# Patient Record
Sex: FEMALE | Race: WHITE | NOT HISPANIC OR LATINO | Employment: OTHER | URBAN - METROPOLITAN AREA
[De-identification: names, ages, dates, MRNs, and addresses within clinical notes are randomized per-mention and may not be internally consistent; named-entity substitution may affect disease eponyms.]

---

## 2023-04-06 DIAGNOSIS — I10 HYPERTENSION, UNSPECIFIED TYPE: ICD-10-CM

## 2023-04-06 RX ORDER — VALSARTAN AND HYDROCHLOROTHIAZIDE 160; 12.5 MG/1; MG/1
1 TABLET, FILM COATED ORAL DAILY
Qty: 90 TABLET | Refills: 3 | Status: SHIPPED | OUTPATIENT
Start: 2023-04-06 | End: 2024-01-04 | Stop reason: ALTCHOICE

## 2023-04-06 RX ORDER — VALSARTAN AND HYDROCHLOROTHIAZIDE 160; 12.5 MG/1; MG/1
1 TABLET, FILM COATED ORAL DAILY
COMMUNITY
End: 2023-04-06 | Stop reason: SDUPTHER

## 2023-04-12 RX ORDER — ATORVASTATIN CALCIUM 10 MG/1
10 TABLET, FILM COATED ORAL DAILY
Qty: 90 TABLET | Refills: 1 | Status: SHIPPED | OUTPATIENT
Start: 2023-04-12 | End: 2024-02-02 | Stop reason: HOSPADM

## 2023-04-12 RX ORDER — ATORVASTATIN CALCIUM 10 MG/1
10 TABLET, FILM COATED ORAL DAILY
COMMUNITY
End: 2023-04-12 | Stop reason: SDUPTHER

## 2023-06-07 LAB
ALANINE AMINOTRANSFERASE (SGPT) (U/L) IN SER/PLAS: 14 U/L (ref 7–45)
ALBUMIN (G/DL) IN SER/PLAS: 4.1 G/DL (ref 3.4–5)
ALKALINE PHOSPHATASE (U/L) IN SER/PLAS: 77 U/L (ref 33–136)
ANION GAP IN SER/PLAS: 13 MMOL/L (ref 10–20)
ASPARTATE AMINOTRANSFERASE (SGOT) (U/L) IN SER/PLAS: 21 U/L (ref 9–39)
BILIRUBIN TOTAL (MG/DL) IN SER/PLAS: 0.3 MG/DL (ref 0–1.2)
CALCIDIOL (25 OH VITAMIN D3) (NG/ML) IN SER/PLAS: 53 NG/ML
CALCIUM (MG/DL) IN SER/PLAS: 10.8 MG/DL (ref 8.6–10.6)
CARBON DIOXIDE, TOTAL (MMOL/L) IN SER/PLAS: 31 MMOL/L (ref 21–32)
CHLORIDE (MMOL/L) IN SER/PLAS: 106 MMOL/L (ref 98–107)
CREATININE (MG/DL) IN SER/PLAS: 1.44 MG/DL (ref 0.5–1.05)
GFR FEMALE: 35 ML/MIN/1.73M2
GLUCOSE (MG/DL) IN SER/PLAS: 98 MG/DL (ref 74–99)
PARATHYRIN INTACT (PG/ML) IN SER/PLAS: 71.5 PG/ML (ref 18.5–88)
POTASSIUM (MMOL/L) IN SER/PLAS: 4.6 MMOL/L (ref 3.5–5.3)
PROTEIN TOTAL: 6 G/DL (ref 6.4–8.2)
SODIUM (MMOL/L) IN SER/PLAS: 145 MMOL/L (ref 136–145)
THYROTROPIN (MIU/L) IN SER/PLAS BY DETECTION LIMIT <= 0.05 MIU/L: 2.63 MIU/L (ref 0.44–3.98)
UREA NITROGEN (MG/DL) IN SER/PLAS: 34 MG/DL (ref 6–23)

## 2023-09-27 DIAGNOSIS — Z00.00 ANNUAL PHYSICAL EXAM: ICD-10-CM

## 2023-10-26 ENCOUNTER — LAB (OUTPATIENT)
Dept: LAB | Facility: LAB | Age: 88
End: 2023-10-26
Payer: MEDICARE

## 2023-10-26 DIAGNOSIS — Z00.00 ENCOUNTER FOR GENERAL ADULT MEDICAL EXAMINATION WITHOUT ABNORMAL FINDINGS: Primary | ICD-10-CM

## 2023-10-26 DIAGNOSIS — Z00.00 ANNUAL PHYSICAL EXAM: ICD-10-CM

## 2023-10-26 LAB
25(OH)D3 SERPL-MCNC: 54 NG/ML (ref 30–100)
ALBUMIN SERPL BCP-MCNC: 4.1 G/DL (ref 3.4–5)
ALP SERPL-CCNC: 73 U/L (ref 33–136)
ALT SERPL W P-5'-P-CCNC: 13 U/L (ref 7–45)
ANION GAP SERPL CALC-SCNC: 15 MMOL/L (ref 10–20)
AST SERPL W P-5'-P-CCNC: 19 U/L (ref 9–39)
BASOPHILS # BLD AUTO: 0.06 X10*3/UL (ref 0–0.1)
BASOPHILS NFR BLD AUTO: 1.1 %
BILIRUB SERPL-MCNC: 0.5 MG/DL (ref 0–1.2)
BUN SERPL-MCNC: 38 MG/DL (ref 6–23)
CALCIUM SERPL-MCNC: 10.6 MG/DL (ref 8.6–10.6)
CHLORIDE SERPL-SCNC: 105 MMOL/L (ref 98–107)
CHOLEST SERPL-MCNC: 199 MG/DL (ref 0–199)
CHOLESTEROL/HDL RATIO: 2.6
CO2 SERPL-SCNC: 27 MMOL/L (ref 21–32)
CREAT SERPL-MCNC: 1.72 MG/DL (ref 0.5–1.05)
EOSINOPHIL # BLD AUTO: 0.27 X10*3/UL (ref 0–0.4)
EOSINOPHIL NFR BLD AUTO: 4.8 %
ERYTHROCYTE [DISTWIDTH] IN BLOOD BY AUTOMATED COUNT: 14.7 % (ref 11.5–14.5)
EST. AVERAGE GLUCOSE BLD GHB EST-MCNC: 126 MG/DL
GFR SERPL CREATININE-BSD FRML MDRD: 28 ML/MIN/1.73M*2
GLUCOSE SERPL-MCNC: 109 MG/DL (ref 74–99)
HBA1C MFR BLD: 6 %
HCT VFR BLD AUTO: 38 % (ref 36–46)
HDLC SERPL-MCNC: 75.8 MG/DL
HGB BLD-MCNC: 11.5 G/DL (ref 12–16)
IMM GRANULOCYTES # BLD AUTO: 0.04 X10*3/UL (ref 0–0.5)
IMM GRANULOCYTES NFR BLD AUTO: 0.7 % (ref 0–0.9)
LDLC SERPL CALC-MCNC: 96 MG/DL
LYMPHOCYTES # BLD AUTO: 1.49 X10*3/UL (ref 0.8–3)
LYMPHOCYTES NFR BLD AUTO: 26.4 %
MCH RBC QN AUTO: 27.8 PG (ref 26–34)
MCHC RBC AUTO-ENTMCNC: 30.3 G/DL (ref 32–36)
MCV RBC AUTO: 92 FL (ref 80–100)
MONOCYTES # BLD AUTO: 0.46 X10*3/UL (ref 0.05–0.8)
MONOCYTES NFR BLD AUTO: 8.1 %
NEUTROPHILS # BLD AUTO: 3.33 X10*3/UL (ref 1.6–5.5)
NEUTROPHILS NFR BLD AUTO: 58.9 %
NON HDL CHOLESTEROL: 123 MG/DL (ref 0–149)
NRBC BLD-RTO: 0 /100 WBCS (ref 0–0)
PLATELET # BLD AUTO: 208 X10*3/UL (ref 150–450)
PMV BLD AUTO: 11.7 FL (ref 7.5–11.5)
POTASSIUM SERPL-SCNC: 4.5 MMOL/L (ref 3.5–5.3)
PROT SERPL-MCNC: 6 G/DL (ref 6.4–8.2)
RBC # BLD AUTO: 4.13 X10*6/UL (ref 4–5.2)
SODIUM SERPL-SCNC: 142 MMOL/L (ref 136–145)
T4 FREE SERPL-MCNC: 0.98 NG/DL (ref 0.78–1.48)
TRIGL SERPL-MCNC: 134 MG/DL (ref 0–149)
TSH SERPL-ACNC: 5.63 MIU/L (ref 0.44–3.98)
VLDL: 27 MG/DL (ref 0–40)
WBC # BLD AUTO: 5.7 X10*3/UL (ref 4.4–11.3)

## 2023-10-26 PROCEDURE — 83036 HEMOGLOBIN GLYCOSYLATED A1C: CPT

## 2023-10-26 PROCEDURE — 84439 ASSAY OF FREE THYROXINE: CPT

## 2023-10-26 PROCEDURE — 36415 COLL VENOUS BLD VENIPUNCTURE: CPT

## 2023-10-26 PROCEDURE — 82306 VITAMIN D 25 HYDROXY: CPT

## 2023-10-26 PROCEDURE — 84443 ASSAY THYROID STIM HORMONE: CPT

## 2023-10-26 PROCEDURE — 85025 COMPLETE CBC W/AUTO DIFF WBC: CPT

## 2023-10-26 PROCEDURE — 80061 LIPID PANEL: CPT

## 2023-10-26 PROCEDURE — 80053 COMPREHEN METABOLIC PANEL: CPT

## 2023-10-27 ENCOUNTER — LAB (OUTPATIENT)
Dept: LAB | Facility: LAB | Age: 88
End: 2023-10-27
Payer: MEDICARE

## 2023-10-27 DIAGNOSIS — Z00.00 ENCOUNTER FOR GENERAL ADULT MEDICAL EXAMINATION WITHOUT ABNORMAL FINDINGS: ICD-10-CM

## 2023-10-27 LAB
APPEARANCE UR: CLEAR
BILIRUB UR STRIP.AUTO-MCNC: NEGATIVE MG/DL
COLOR UR: YELLOW
GLUCOSE UR STRIP.AUTO-MCNC: NEGATIVE MG/DL
KETONES UR STRIP.AUTO-MCNC: NEGATIVE MG/DL
LEUKOCYTE ESTERASE UR QL STRIP.AUTO: ABNORMAL
NITRITE UR QL STRIP.AUTO: NEGATIVE
PH UR STRIP.AUTO: 6 [PH]
PROT UR STRIP.AUTO-MCNC: NEGATIVE MG/DL
RBC # UR STRIP.AUTO: NEGATIVE /UL
RBC #/AREA URNS AUTO: NORMAL /HPF
RENAL EPI CELLS #/AREA UR COMP ASSIST: NORMAL /HPF
SP GR UR STRIP.AUTO: 1.02
UROBILINOGEN UR STRIP.AUTO-MCNC: <2 MG/DL
WBC #/AREA URNS AUTO: NORMAL /HPF

## 2023-10-27 PROCEDURE — 81001 URINALYSIS AUTO W/SCOPE: CPT

## 2023-11-08 PROBLEM — M70.72 BURSITIS OF LEFT HIP: Status: ACTIVE | Noted: 2023-11-08

## 2023-11-08 PROBLEM — I10 HYPERTENSION: Status: ACTIVE | Noted: 2023-11-08

## 2023-11-08 PROBLEM — G47.00 INSOMNIA: Status: ACTIVE | Noted: 2023-11-08

## 2023-11-08 PROBLEM — K52.832 LYMPHOCYTIC-PLASMACYTIC COLITIS: Status: ACTIVE | Noted: 2023-11-08

## 2023-11-08 PROBLEM — M19.019 PRIMARY LOCALIZED OSTEOARTHROSIS OF SHOULDER REGION: Status: ACTIVE | Noted: 2023-11-08

## 2023-11-08 PROBLEM — R79.89 ELEVATED HOMOCYSTEINE: Status: ACTIVE | Noted: 2023-11-08

## 2023-11-08 PROBLEM — E55.9 VITAMIN D INSUFFICIENCY: Status: ACTIVE | Noted: 2023-11-08

## 2023-11-08 PROBLEM — M19.90 ARTHRITIS: Status: ACTIVE | Noted: 2023-11-08

## 2023-11-08 PROBLEM — S32.9XXA PELVIC FRACTURE (MULTI): Status: ACTIVE | Noted: 2023-11-08

## 2023-11-08 PROBLEM — G47.62 NOCTURNAL LEG CRAMPS: Status: ACTIVE | Noted: 2023-11-08

## 2023-11-08 PROBLEM — R05.8 COUGH PRODUCTIVE OF PURULENT SPUTUM: Status: ACTIVE | Noted: 2023-11-08

## 2023-11-08 PROBLEM — R73.03 PREDIABETES: Status: ACTIVE | Noted: 2023-11-08

## 2023-11-08 PROBLEM — M81.0 OSTEOPOROSIS: Status: ACTIVE | Noted: 2023-11-08

## 2023-11-08 PROBLEM — E03.9 HYPOTHYROIDISM: Status: ACTIVE | Noted: 2023-11-08

## 2023-11-08 PROBLEM — M54.16 LUMBAR RADICULOPATHY: Status: ACTIVE | Noted: 2023-11-08

## 2023-11-08 PROBLEM — M19.90 OSTEOARTHRITIS: Status: ACTIVE | Noted: 2023-11-08

## 2023-11-08 PROBLEM — E21.3 HYPERPARATHYROIDISM (MULTI): Status: ACTIVE | Noted: 2023-11-08

## 2023-11-08 PROBLEM — E56.9 VITAMIN DEFICIENCY: Status: ACTIVE | Noted: 2023-11-08

## 2023-11-08 PROBLEM — M1A.0510 CHRONIC GOUT OF RIGHT HIP: Status: ACTIVE | Noted: 2023-11-08

## 2023-11-08 PROBLEM — E61.1 DIETARY IRON DEFICIENCY: Status: ACTIVE | Noted: 2023-11-08

## 2023-11-08 PROBLEM — R26.89 IMPAIRMENT OF BALANCE: Status: ACTIVE | Noted: 2023-11-08

## 2023-11-08 PROBLEM — K21.9 GASTROESOPHAGEAL REFLUX DISEASE: Status: ACTIVE | Noted: 2023-11-08

## 2023-11-08 PROBLEM — C44.91 CARCINOMAS, BASAL CELL: Status: ACTIVE | Noted: 2023-11-08

## 2023-11-08 PROBLEM — R09.89 BILATERAL CAROTID BRUITS: Status: ACTIVE | Noted: 2023-11-08

## 2023-11-08 PROBLEM — E78.5 HYPERLIPIDEMIA: Status: ACTIVE | Noted: 2023-11-08

## 2023-11-08 PROBLEM — E66.9 OBESITY (BMI 30.0-34.9): Status: ACTIVE | Noted: 2023-11-08

## 2023-11-08 PROBLEM — J30.9 ALLERGIC RHINITIS: Status: ACTIVE | Noted: 2023-11-08

## 2023-11-08 PROBLEM — E66.811 OBESITY (BMI 30.0-34.9): Status: ACTIVE | Noted: 2023-11-08

## 2023-11-08 PROBLEM — R19.7 DIARRHEA OF PRESUMED INFECTIOUS ORIGIN: Status: ACTIVE | Noted: 2023-11-08

## 2023-11-08 PROBLEM — R01.1 MURMUR, CARDIAC: Status: ACTIVE | Noted: 2023-11-08

## 2023-11-08 PROBLEM — M10.9 GOUT: Status: ACTIVE | Noted: 2023-11-08

## 2023-11-08 PROBLEM — R73.9 CHRONIC HYPERGLYCEMIA: Status: ACTIVE | Noted: 2023-11-08

## 2023-11-08 PROBLEM — I35.0 AORTIC STENOSIS: Status: ACTIVE | Noted: 2023-11-08

## 2023-11-08 RX ORDER — QUINIDINE GLUCONATE 324 MG
1 TABLET, EXTENDED RELEASE ORAL DAILY
COMMUNITY
End: 2023-11-09 | Stop reason: ALTCHOICE

## 2023-11-08 RX ORDER — FOLIC ACID/VIT B COMPLEX AND C 5 MG
1 TABLET ORAL DAILY
COMMUNITY
End: 2023-11-09 | Stop reason: ALTCHOICE

## 2023-11-08 RX ORDER — POLYETHYLENE GLYCOL 3350 17 G/17G
17 POWDER, FOR SOLUTION ORAL DAILY
COMMUNITY
End: 2024-01-04 | Stop reason: ALTCHOICE

## 2023-11-08 RX ORDER — MINERAL OIL
180 ENEMA (ML) RECTAL DAILY
COMMUNITY
Start: 2010-08-31

## 2023-11-08 RX ORDER — OXYCODONE AND ACETAMINOPHEN 5; 325 MG/1; MG/1
1 TABLET ORAL EVERY 6 HOURS PRN
COMMUNITY
End: 2023-11-09 | Stop reason: ALTCHOICE

## 2023-11-08 RX ORDER — OMEPRAZOLE 20 MG/1
1 CAPSULE, DELAYED RELEASE ORAL DAILY
COMMUNITY

## 2023-11-08 RX ORDER — IBUPROFEN 200 MG
2 TABLET ORAL EVERY 6 HOURS PRN
COMMUNITY
End: 2024-02-02 | Stop reason: HOSPADM

## 2023-11-08 RX ORDER — OMEPRAZOLE 20 MG/1
TABLET, DELAYED RELEASE ORAL
COMMUNITY
End: 2023-11-09 | Stop reason: ALTCHOICE

## 2023-11-08 RX ORDER — LEVOTHYROXINE SODIUM 125 UG/1
1 TABLET ORAL DAILY
COMMUNITY
Start: 2016-05-19

## 2023-11-08 RX ORDER — PNV NO.95/FERROUS FUM/FOLIC AC 28MG-0.8MG
2 TABLET ORAL NIGHTLY
COMMUNITY

## 2023-11-08 RX ORDER — ACETAMINOPHEN 500 MG
1000 TABLET ORAL EVERY 8 HOURS PRN
COMMUNITY

## 2023-11-08 RX ORDER — ALENDRONATE SODIUM 70 MG/1
1 TABLET ORAL
COMMUNITY
End: 2023-11-09 | Stop reason: ALTCHOICE

## 2023-11-08 RX ORDER — QUININE SULFATE 324 MG/1
1 CAPSULE ORAL NIGHTLY
COMMUNITY

## 2023-11-08 RX ORDER — DENOSUMAB 60 MG/ML
1 INJECTION SUBCUTANEOUS
COMMUNITY
Start: 2021-07-14

## 2023-11-08 RX ORDER — TRIPROLIDINE/PSEUDOEPHEDRINE 2.5MG-60MG
TABLET ORAL AS NEEDED
COMMUNITY
Start: 2010-08-31 | End: 2023-11-09 | Stop reason: ALTCHOICE

## 2023-11-08 RX ORDER — ERGOCALCIFEROL 1.25 MG/1
1 CAPSULE ORAL
Status: ON HOLD | COMMUNITY
Start: 2010-08-31 | End: 2024-02-23 | Stop reason: ALTCHOICE

## 2023-11-08 RX ORDER — LEVOTHYROXINE SODIUM 137 UG/1
1 TABLET ORAL DAILY
COMMUNITY
Start: 2010-08-31 | End: 2023-11-10 | Stop reason: ALTCHOICE

## 2023-11-08 RX ORDER — ASPIRIN 81 MG/1
1 TABLET ORAL NIGHTLY
COMMUNITY
Start: 2010-08-31

## 2023-11-08 RX ORDER — MV-MIN/FOLIC/K1/LYCOPEN/LUTEIN 300-60 MCG
1 TABLET ORAL DAILY
Status: ON HOLD | COMMUNITY
Start: 2010-08-31 | End: 2024-02-23 | Stop reason: ALTCHOICE

## 2023-11-08 RX ORDER — VALSARTAN AND HYDROCHLOROTHIAZIDE 320; 25 MG/1; MG/1
1 TABLET, FILM COATED ORAL DAILY
COMMUNITY
Start: 2022-07-18 | End: 2023-11-09 | Stop reason: ALTCHOICE

## 2023-11-08 RX ORDER — FLUTICASONE PROPIONATE 50 MCG
2 SPRAY, SUSPENSION (ML) NASAL DAILY
COMMUNITY
Start: 2010-08-31

## 2023-11-08 RX ORDER — ZOLPIDEM TARTRATE 5 MG/1
1 TABLET ORAL NIGHTLY PRN
COMMUNITY
Start: 2015-07-29 | End: 2023-11-09 | Stop reason: SDUPTHER

## 2023-11-08 RX ORDER — FLAXSEED OIL 1000 MG
1 CAPSULE ORAL 2 TIMES DAILY
COMMUNITY
Start: 2010-08-31

## 2023-11-08 RX ORDER — TRAMADOL HYDROCHLORIDE 50 MG/1
1 TABLET ORAL 2 TIMES DAILY
COMMUNITY
End: 2023-11-09 | Stop reason: ALTCHOICE

## 2023-11-08 RX ORDER — ELECTROLYTES/DEXTROSE
1 SOLUTION, ORAL ORAL DAILY
COMMUNITY
Start: 2010-08-31

## 2023-11-08 RX ORDER — METHYLPREDNISOLONE 4 MG/1
TABLET ORAL
COMMUNITY
Start: 2023-02-24 | End: 2023-11-09 | Stop reason: ALTCHOICE

## 2023-11-08 RX ORDER — UREA 40 G/100G
LOTION TOPICAL NIGHTLY
COMMUNITY
End: 2023-11-09 | Stop reason: ALTCHOICE

## 2023-11-08 RX ORDER — ZOLPIDEM TARTRATE 6.25 MG/1
1 TABLET, FILM COATED, EXTENDED RELEASE ORAL DAILY
COMMUNITY
Start: 2010-08-31 | End: 2023-11-09 | Stop reason: ALTCHOICE

## 2023-11-09 ENCOUNTER — OFFICE VISIT (OUTPATIENT)
Dept: PRIMARY CARE | Facility: CLINIC | Age: 88
End: 2023-11-09
Payer: MEDICARE

## 2023-11-09 VITALS
OXYGEN SATURATION: 96 % | SYSTOLIC BLOOD PRESSURE: 122 MMHG | DIASTOLIC BLOOD PRESSURE: 78 MMHG | HEIGHT: 61 IN | HEART RATE: 78 BPM | WEIGHT: 163.14 LBS | BODY MASS INDEX: 30.8 KG/M2

## 2023-11-09 DIAGNOSIS — I35.0 NONRHEUMATIC AORTIC VALVE STENOSIS: Primary | ICD-10-CM

## 2023-11-09 DIAGNOSIS — N18.31 CHRONIC RENAL IMPAIRMENT, STAGE 3A (MULTI): ICD-10-CM

## 2023-11-09 DIAGNOSIS — I10 PRIMARY HYPERTENSION: ICD-10-CM

## 2023-11-09 DIAGNOSIS — E78.2 MIXED HYPERLIPIDEMIA: ICD-10-CM

## 2023-11-09 DIAGNOSIS — R09.89 BRUIT OF RIGHT CAROTID ARTERY: ICD-10-CM

## 2023-11-09 DIAGNOSIS — F51.01 PRIMARY INSOMNIA: ICD-10-CM

## 2023-11-09 PROCEDURE — UHSPHYS PR UH SELECT PHYSICAL: Performed by: INTERNAL MEDICINE

## 2023-11-09 PROCEDURE — 3078F DIAST BP <80 MM HG: CPT | Performed by: INTERNAL MEDICINE

## 2023-11-09 PROCEDURE — 1159F MED LIST DOCD IN RCRD: CPT | Performed by: INTERNAL MEDICINE

## 2023-11-09 PROCEDURE — 3074F SYST BP LT 130 MM HG: CPT | Performed by: INTERNAL MEDICINE

## 2023-11-09 PROCEDURE — 1036F TOBACCO NON-USER: CPT | Performed by: INTERNAL MEDICINE

## 2023-11-09 RX ORDER — ZOLPIDEM TARTRATE 5 MG/1
5 TABLET ORAL NIGHTLY PRN
Qty: 30 TABLET | Refills: 3 | Status: SHIPPED | OUTPATIENT
Start: 2023-11-09

## 2023-11-09 ASSESSMENT — ENCOUNTER SYMPTOMS
ABDOMINAL DISTENTION: 0
MYALGIAS: 0
LIGHT-HEADEDNESS: 0
TROUBLE SWALLOWING: 0
DYSPHORIC MOOD: 0
HEMATURIA: 0
FACIAL SWELLING: 0
ARTHRALGIAS: 0
DIZZINESS: 0
BRUISES/BLEEDS EASILY: 0
FATIGUE: 0
WEAKNESS: 0
HYPERACTIVE: 0
HEADACHES: 0
JOINT SWELLING: 0
SLEEP DISTURBANCE: 0
DIARRHEA: 0
UNEXPECTED WEIGHT CHANGE: 0
ADENOPATHY: 0
SORE THROAT: 0
DEPRESSION: 0
LOSS OF SENSATION IN FEET: 0
CONSTITUTIONAL NEGATIVE: 1
RHINORRHEA: 0
BLOOD IN STOOL: 0
NAUSEA: 0
NECK STIFFNESS: 0
SINUS PAIN: 0
CONSTIPATION: 0
NUMBNESS: 0
ACTIVITY CHANGE: 0
PALPITATIONS: 0
DYSURIA: 0
SINUS PRESSURE: 0
OCCASIONAL FEELINGS OF UNSTEADINESS: 1
CHILLS: 0
SHORTNESS OF BREATH: 0
COUGH: 0
BACK PAIN: 0
FEVER: 0
NERVOUS/ANXIOUS: 0
FREQUENCY: 0
ABDOMINAL PAIN: 0
POLYDIPSIA: 0
CONFUSION: 0
CHEST TIGHTNESS: 0
VOMITING: 0
WHEEZING: 0

## 2023-11-09 NOTE — ASSESSMENT & PLAN NOTE
We will repeat carotid artery ultrasound these have not been rechecked in about 5 years.  She remains on low-dose aspirin.  She had no neurologic symptoms.

## 2023-11-09 NOTE — ASSESSMENT & PLAN NOTE
Refilled her zolpidem which has been taking for many years without side effects.  OARRS report was checked which showed no unusual activity.

## 2023-11-09 NOTE — PROGRESS NOTES
Josee Henson       Patient is here for a complete physical and a general checkup.  She feels generally quite well without any major complaints.  She continues to have chronic low back pain.  She takes a couple of ibuprofen every morning for this and manages fairly well.  She recently strained a muscle in her upper back reaching for a high shelf but that seems to be slowly improving.  She has occasional urinary incontinence.  Rarely during the daytime occasionally at nighttime.  This is ongoing and longstanding and really unchanged.  She wears a protective undergarment and does fairly well with that.  Reviewing her labs noted slightly elevated creatinine above her baseline.  Also noted her TSH to be slightly elevated.  Patient is not sure of her current Synthroid dose.  We have a couple of doses listed in her chart.  Otherwise she feels well she is exercising a couple times a week doing a lot of balance and stretching.  She is not doing really much in way of core strength or general weight training.           Review of Systems   Constitutional: Negative.  Negative for activity change, chills, fatigue, fever and unexpected weight change.   HENT:  Positive for hearing loss (Recently got hearing aids). Negative for dental problem, ear pain, facial swelling, mouth sores, rhinorrhea, sinus pressure, sinus pain, sore throat, tinnitus and trouble swallowing.    Eyes:  Negative for visual disturbance.   Respiratory:  Negative for cough, chest tightness, shortness of breath and wheezing.    Cardiovascular:  Negative for chest pain, palpitations and leg swelling.   Gastrointestinal:  Negative for abdominal distention, abdominal pain, blood in stool, constipation, diarrhea, nausea and vomiting.   Endocrine: Negative for cold intolerance, heat intolerance, polydipsia and polyuria.   Genitourinary:  Positive for urgency (Occasional incontinence.). Negative for dysuria, frequency and hematuria.   Musculoskeletal:  Negative for  "arthralgias, back pain, joint swelling, myalgias and neck stiffness.   Skin:  Negative for rash.   Allergic/Immunologic: Negative for food allergies.   Neurological:  Negative for dizziness, weakness, light-headedness, numbness and headaches.   Hematological:  Negative for adenopathy. Does not bruise/bleed easily.   Psychiatric/Behavioral:  Negative for confusion, dysphoric mood and sleep disturbance. The patient is not nervous/anxious and is not hyperactive.           Objective      Visit Vitals  /78   Pulse 78   Ht 1.54 m (5' 0.63\")   Wt 74 kg (163 lb 2.3 oz)   SpO2 96%   BMI 31.20 kg/m²   Smoking Status Never   BSA 1.78 m²        Physical Exam  Constitutional:       Appearance: Normal appearance. She is normal weight.   HENT:      Head: Normocephalic and atraumatic.      Right Ear: Tympanic membrane, ear canal and external ear normal.      Left Ear: Tympanic membrane, ear canal and external ear normal.      Nose: Nose normal.      Mouth/Throat:      Mouth: Mucous membranes are moist. No oral lesions.      Pharynx: Oropharynx is clear. Uvula midline. No oropharyngeal exudate or posterior oropharyngeal erythema.   Neck:      Thyroid: No thyroid mass or thyromegaly.      Vascular: Carotid bruit (Right bruit) present. No JVD.   Cardiovascular:      Rate and Rhythm: Normal rate and regular rhythm.      Pulses: Normal pulses.           Carotid pulses are 2+ on the right side and 2+ on the left side.       Radial pulses are 2+ on the right side and 2+ on the left side.        Femoral pulses are 2+ on the right side and 2+ on the left side.       Popliteal pulses are 2+ on the right side and 2+ on the left side.        Dorsalis pedis pulses are 2+ on the right side and 2+ on the left side.        Posterior tibial pulses are 2+ on the right side and 2+ on the left side.      Heart sounds: S1 normal and S2 normal. Murmur (2/6 to 3/6 systolic ejection murmur which radiates into the neck.) heard.   Pulmonary:      " Effort: Pulmonary effort is normal.      Breath sounds: Normal breath sounds.   Chest:   Breasts:     Right: Normal. No mass or nipple discharge.      Left: Normal. No mass or nipple discharge.   Abdominal:      General: Abdomen is flat. Bowel sounds are normal.      Palpations: Abdomen is soft.      Tenderness: There is no abdominal tenderness.      Hernia: No hernia is present.   Musculoskeletal:         General: No swelling or tenderness. Normal range of motion.      Cervical back: Normal range of motion and neck supple. No rigidity.      Right lower leg: Edema (Trace) present.      Left lower leg: No edema (Trace).   Lymphadenopathy:      Cervical: No cervical adenopathy.   Skin:     General: Skin is warm and dry.      Findings: No lesion or rash.   Neurological:      General: No focal deficit present.      Mental Status: She is alert and oriented to person, place, and time. Mental status is at baseline.   Psychiatric:         Mood and Affect: Mood normal.         Thought Content: Thought content normal.              Assess/Plan SmartLinks:   Problem List Items Addressed This Visit             ICD-10-CM    Aortic stenosis - Primary I35.0     Patient is due for another echo her most recent 1 showed moderate to severe AS.  She has been asymptomatic.         Relevant Orders    Transthoracic Echo (TTE) Complete    Bruit of right carotid artery R09.89     We will repeat carotid artery ultrasound these have not been rechecked in about 5 years.  She remains on low-dose aspirin.  She had no neurologic symptoms.         Relevant Orders    Vascular US Carotid Artery Duplex Bilateral    Hyperlipidemia E78.5     Lipids are at target on current doses of atorvastatin 10 mg daily         Hypertension I10     Blood pressure well controlled on current doses of medication.  Note is made of her elevated creatinine.  We will discontinue her NSAIDs and have her recheck this in about 30 days.  If it remains elevated we may need to  discontinue her diuretics as well.  Although she does have trace edema.         Insomnia G47.00     Refilled her zolpidem which has been taking for many years without side effects.  OARRS report was checked which showed no unusual activity.         Relevant Medications    zolpidem (Ambien) 5 mg tablet    Chronic renal impairment, stage 3a (CMS/HCC) N18.31     Recheck in 30 days after discontinuing NSAIDs urinalysis is benign.         Relevant Orders    Basic Metabolic Panel   Patient is no longer appropriate for mammograms or colonoscopy.  Patient is up-to-date on all vaccines she will be getting an RSV vaccine later on this week.

## 2023-11-09 NOTE — ASSESSMENT & PLAN NOTE
Patient is due for another echo her most recent 1 showed moderate to severe AS.  She has been asymptomatic.

## 2023-11-09 NOTE — ASSESSMENT & PLAN NOTE
Blood pressure well controlled on current doses of medication.  Note is made of her elevated creatinine.  We will discontinue her NSAIDs and have her recheck this in about 30 days.  If it remains elevated we may need to discontinue her diuretics as well.  Although she does have trace edema.

## 2023-11-10 DIAGNOSIS — M54.16 LUMBAR RADICULOPATHY: Primary | ICD-10-CM

## 2023-11-27 ENCOUNTER — APPOINTMENT (OUTPATIENT)
Dept: VASCULAR MEDICINE | Facility: HOSPITAL | Age: 88
End: 2023-11-27
Payer: MEDICARE

## 2023-11-27 ENCOUNTER — APPOINTMENT (OUTPATIENT)
Dept: CARDIOLOGY | Facility: HOSPITAL | Age: 88
End: 2023-11-27
Payer: MEDICARE

## 2023-11-30 ENCOUNTER — HOSPITAL ENCOUNTER (OUTPATIENT)
Dept: VASCULAR MEDICINE | Facility: HOSPITAL | Age: 88
Discharge: HOME | End: 2023-11-30
Payer: MEDICARE

## 2023-11-30 ENCOUNTER — LAB (OUTPATIENT)
Dept: LAB | Facility: LAB | Age: 88
End: 2023-11-30
Payer: MEDICARE

## 2023-11-30 ENCOUNTER — HOSPITAL ENCOUNTER (OUTPATIENT)
Dept: CARDIOLOGY | Facility: HOSPITAL | Age: 88
Discharge: HOME | End: 2023-11-30
Payer: MEDICARE

## 2023-11-30 DIAGNOSIS — N18.31 CHRONIC RENAL IMPAIRMENT, STAGE 3A (MULTI): ICD-10-CM

## 2023-11-30 DIAGNOSIS — R09.89 BRUIT OF RIGHT CAROTID ARTERY: ICD-10-CM

## 2023-11-30 DIAGNOSIS — I35.0 NONRHEUMATIC AORTIC VALVE STENOSIS: ICD-10-CM

## 2023-11-30 LAB
ANION GAP SERPL CALC-SCNC: 15 MMOL/L (ref 10–20)
AORTIC VALVE MEAN GRADIENT: 29
AORTIC VALVE PEAK VELOCITY: 3.54
AV PEAK GRADIENT: 50.1
AVA (PEAK VEL): 0.63
AVA (VTI): 0.56
BUN SERPL-MCNC: 37 MG/DL (ref 6–23)
CALCIUM SERPL-MCNC: 10.9 MG/DL (ref 8.6–10.3)
CHLORIDE SERPL-SCNC: 102 MMOL/L (ref 98–107)
CO2 SERPL-SCNC: 27 MMOL/L (ref 21–32)
CREAT SERPL-MCNC: 1.63 MG/DL (ref 0.5–1.05)
EJECTION FRACTION APICAL 4 CHAMBER: 66.7
EJECTION FRACTION: 69
GFR SERPL CREATININE-BSD FRML MDRD: 30 ML/MIN/1.73M*2
GLUCOSE SERPL-MCNC: 83 MG/DL (ref 74–99)
LEFT ATRIUM VOLUME AREA LENGTH INDEX BSA: 36.5
LEFT VENTRICLE INTERNAL DIMENSION DIASTOLE: 4 (ref 3.5–6)
LEFT VENTRICULAR OUTFLOW TRACT DIAMETER: 1.74
MITRAL VALVE E/A RATIO: 0.92
MITRAL VALVE E/E' RATIO: 5
POTASSIUM SERPL-SCNC: 5 MMOL/L (ref 3.5–5.3)
RIGHT VENTRICLE FREE WALL PEAK S': 12.9
RIGHT VENTRICLE PEAK SYSTOLIC PRESSURE: 36.2
SODIUM SERPL-SCNC: 139 MMOL/L (ref 136–145)
TRICUSPID ANNULAR PLANE SYSTOLIC EXCURSION: 2.3

## 2023-11-30 PROCEDURE — 93880 EXTRACRANIAL BILAT STUDY: CPT

## 2023-11-30 PROCEDURE — 93880 EXTRACRANIAL BILAT STUDY: CPT | Performed by: SURGERY

## 2023-11-30 PROCEDURE — 80048 BASIC METABOLIC PNL TOTAL CA: CPT

## 2023-11-30 PROCEDURE — 93306 TTE W/DOPPLER COMPLETE: CPT | Performed by: INTERNAL MEDICINE

## 2023-11-30 PROCEDURE — 36415 COLL VENOUS BLD VENIPUNCTURE: CPT

## 2023-11-30 PROCEDURE — 93306 TTE W/DOPPLER COMPLETE: CPT

## 2023-12-28 DIAGNOSIS — M81.0 OSTEOPOROSIS, UNSPECIFIED OSTEOPOROSIS TYPE, UNSPECIFIED PATHOLOGICAL FRACTURE PRESENCE: Primary | ICD-10-CM

## 2023-12-28 RX ORDER — EPINEPHRINE 0.3 MG/.3ML
0.3 INJECTION SUBCUTANEOUS EVERY 5 MIN PRN
Status: CANCELLED | OUTPATIENT
Start: 2024-01-01

## 2023-12-28 RX ORDER — ALBUTEROL SULFATE 0.83 MG/ML
3 SOLUTION RESPIRATORY (INHALATION) AS NEEDED
Status: CANCELLED | OUTPATIENT
Start: 2024-01-01

## 2023-12-28 RX ORDER — FAMOTIDINE 10 MG/ML
20 INJECTION INTRAVENOUS ONCE AS NEEDED
Status: CANCELLED | OUTPATIENT
Start: 2024-01-01

## 2023-12-28 RX ORDER — DIPHENHYDRAMINE HYDROCHLORIDE 50 MG/ML
50 INJECTION INTRAMUSCULAR; INTRAVENOUS AS NEEDED
Status: CANCELLED | OUTPATIENT
Start: 2024-01-01

## 2024-01-03 ENCOUNTER — INFUSION (OUTPATIENT)
Dept: INFUSION THERAPY | Facility: CLINIC | Age: 89
End: 2024-01-03
Payer: MEDICARE

## 2024-01-03 VITALS
OXYGEN SATURATION: 97 % | DIASTOLIC BLOOD PRESSURE: 68 MMHG | TEMPERATURE: 97.2 F | RESPIRATION RATE: 16 BRPM | SYSTOLIC BLOOD PRESSURE: 151 MMHG | WEIGHT: 161.49 LBS | HEART RATE: 58 BPM | BODY MASS INDEX: 30.89 KG/M2

## 2024-01-03 DIAGNOSIS — M81.0 OSTEOPOROSIS, UNSPECIFIED OSTEOPOROSIS TYPE, UNSPECIFIED PATHOLOGICAL FRACTURE PRESENCE: ICD-10-CM

## 2024-01-03 PROCEDURE — 96372 THER/PROPH/DIAG INJ SC/IM: CPT | Performed by: NURSE PRACTITIONER

## 2024-01-03 RX ORDER — DIPHENHYDRAMINE HYDROCHLORIDE 50 MG/ML
50 INJECTION INTRAMUSCULAR; INTRAVENOUS AS NEEDED
OUTPATIENT
Start: 2024-07-01

## 2024-01-03 RX ORDER — ALBUTEROL SULFATE 0.83 MG/ML
3 SOLUTION RESPIRATORY (INHALATION) AS NEEDED
OUTPATIENT
Start: 2024-07-01

## 2024-01-03 RX ORDER — FAMOTIDINE 10 MG/ML
20 INJECTION INTRAVENOUS ONCE AS NEEDED
OUTPATIENT
Start: 2024-07-01

## 2024-01-03 RX ORDER — EPINEPHRINE 0.3 MG/.3ML
0.3 INJECTION SUBCUTANEOUS EVERY 5 MIN PRN
OUTPATIENT
Start: 2024-07-01

## 2024-01-03 ASSESSMENT — ENCOUNTER SYMPTOMS
ABDOMINAL PAIN: 0
NAUSEA: 0
LIGHT-HEADEDNESS: 0
WOUND: 0
FEVER: 0
DYSURIA: 0
UNEXPECTED WEIGHT CHANGE: 0
NUMBNESS: 0
VOICE CHANGE: 0
FREQUENCY: 0
LEG SWELLING: 0
CHILLS: 0
MYALGIAS: 0
HEADACHES: 0
SORE THROAT: 0
COUGH: 0
EXTREMITY WEAKNESS: 0
DEPRESSION: 0
HEMATURIA: 0
CONSTIPATION: 0
WHEEZING: 0
EYE PROBLEMS: 0
BLOOD IN STOOL: 0
TROUBLE SWALLOWING: 0
FATIGUE: 0
APPETITE CHANGE: 0
PALPITATIONS: 0
SHORTNESS OF BREATH: 0
DIZZINESS: 0
DIARRHEA: 0
ARTHRALGIAS: 0
NERVOUS/ANXIOUS: 0
VOMITING: 0

## 2024-01-03 NOTE — PROGRESS NOTES
[facility]   infusion Clinic Note   Date: January 3, 2024   Name: Josee Henson  : 1935   MRN: [id number]         Reason for Visit: Follow-up and Injections (PATIENT IS HERE FOR PROLIA 60 MG INJECTION EVERY 6 MONTHS.)         Visit Type: INJECTION      Ordered By:  DR. ROYA HORNE      Accompanied by:Self      Diagnosis: Osteoporosis, unspecified osteoporosis type, unspecified pathological fracture presence       Allergies:   Allergies as of 2024 - Reviewed 2024   Allergen Reaction Noted    Tetracycline Rash and Unknown 2023         Current Medications has a current medication list which includes the following prescription(s): acetaminophen, aspirin, atorvastatin, biotin, calcium carbonate-vitamin d3, prolia, ergocalciferol, fexofenadine, flaxseed oil, fluticasone, ibuprofen, levothyroxine, centrum silver ultra men's, omeprazole, quinine, valsartan-hydrochlorothiazide, zolpidem, capsicum oleoresin/menth/camph, polyethylene glycol, and ubidecarenone.       Vitals:  Vitals:    24 1312   BP: 151/68   Pulse: 58   Resp: 16   Temp: 36.2 °C (97.2 °F)   SpO2: 97%   Weight: 73.2 kg (161 lb 7.8 oz)             Infusion Pre-procedure Checklist:   - Allergies reviewed: yes   - Medications reviewed: yes       - Previous reaction to current treatment: no      Assess patient for the concerns below. Document provider notification as appropriate.  - Active or recent infection with/without current antibiotic use: no  - Recent or planned invasive dental work: no  - Recent or planned surgeries: no  - Recently received or plans to receive vaccinations: no  - Has treatment related toxicities: no  - Is pregnant:  n/a      Pain: 0   - Is the pain different from normal: n/a   - Is the pain tolerable: n/a   - Is your Doctor aware:  n/a      Labs: N/A         Fall Risk Screening: Estrada Fall Risk  History of Falling, Immediate or Within 3 Months: No  Secondary Diagnosis:  Yes  Ambulatory Aid: Crutches/cane/walker (USES WHEN NEEDS TO WALK FAR PLACES FOR (BACK PAIN))  Intravenous Therapy/Heparin Lock: No  Gait/Transferring: Normal/bedrest/immobile  Mental Status: Oriented to own ability  Dorado Fall Risk Score: 30       Review Of Systems:  Review of Systems   Constitutional:  Negative for appetite change, chills, fatigue, fever and unexpected weight change.   HENT:   Negative for hearing loss, mouth sores, sore throat, tinnitus, trouble swallowing and voice change.         HAS HEARING AIDS MILD DECREASED HEARING.   Eyes:  Negative for eye problems.   Respiratory:  Negative for cough, shortness of breath and wheezing.    Cardiovascular:  Negative for chest pain, leg swelling and palpitations.   Gastrointestinal:  Negative for abdominal pain, blood in stool, constipation, diarrhea, nausea and vomiting.   Genitourinary:  Negative for dysuria, frequency and hematuria.    Musculoskeletal:  Negative for arthralgias and myalgias.        HAS ARTHRITIS AND STATES HAS SCIATICA. 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 REVERSE SHOULDERS BILATERALLY.   Skin:  Negative for itching, rash and wound.   Neurological:  Negative for dizziness, extremity weakness, headaches, light-headedness and numbness.   Psychiatric/Behavioral:  Negative for depression. The patient is not nervous/anxious.          Infusion Readiness:   - Assessment Concerns Related to Infusion: No  - Provider notified: n/a      Document Below Only If Indicated:   New Patient Education:    N/A (returning patient for continuation of therapy. Ongoing education provided as needed.)        Treatment Conditions & Drug Specific Questions:    Denosumab  (PROLIA. XGEVA)    (Unless otherwise specified on patient specific therapy plan):     TREATMENT CONDITIONS:  Unless otherwise specified on patient specific therapy plan HOLD and notify provider prior to proceeding with today's injection if patients:  o Calcium is  LESS THAN 8.6 mg/dL OR  Ionized Calcium LESS THAN 1.1 mmol/L  o Recent or planned invasive dental procedure (within 4 weeks)    Lab Results   Component Value Date    CALCIUM 10.9 (H) 11/30/2023    PHOS 3.8 06/07/2022      Labs reviewed and patient meets treatment conditions? Yes    DRUG SPECIFIC QUESTIONS:  Is the patient taking calcium and vitamin D? Yes  (Recommended)    Pt Instructed on following risks: (1) hypocalcemia, (2) osteonecrosis of the jaw, (3) atypical femoral fractures, (4) serious infections, and (5) dermatologic reactions?  Yes      REMINDER:  PREGNANCY CATEGORY X DRUG. OBTAIN NEGTATIVE PREGNANCY TEST PRIOR TO FIRST INFUSION FOR WOMEN OF CHILDBEARING ABILITY   REMS DRUG    Recommended Vitals/Observation:  Vitals: Obtain vitals prior to injection.  Observation: Patient may leave immediately following injection.        Weight Based Drug Calculations:    WEIGHT BASED DRUGS: NOT APPLICABLE / FLAT DOSE          Initiated By: Grace Mauricio RN   Time: 2:04 PM     We administered denosumab.

## 2024-01-03 NOTE — PATIENT INSTRUCTIONS
Today :We administered denosumab.     For:   1. Osteoporosis, unspecified osteoporosis type, unspecified pathological fracture presence         Your next appointment is due in: 6 MONTHS.       Please read the  Medication Guide that was given to you and reviewed during todays visit.     (Tell all doctors including dentists that you are taking this medication)     Go to the emergency room or call 911 if:  -You have signs of allergic reaction:   -Rash, hives, itching.   -Swollen, blistered, peeling skin.   -Swelling of face, lips, mouth, tongue or throat.   -Tightness of chest, trouble breathing, swallowing or talking     Call your doctor:  - If IV / injection site gets red, warm, swollen, itchy or leaks fluid or pus.     (Leave dressing on your IV site for at least 2 hours and keep area clean and dry  - If you get sick or have symptoms of infection or are not feeling well for any reason.    (Wash your hands often, stay away from people who are sick)  - If you have side effects from your medication that do not go away or are bothersome.     (Refer to the teaching your nurse gave you for side effects to call your doctor about)    - Common side effects may include:  stuffy nose, headache, feeling tired, muscle aches, upset stomach  - Before receiving any vaccines     - Call the [facility] at   If:  - You get sick, are on antibiotics, have had a recent vaccine, have surgery or dental work and your doctor wants your visit rescheduled.  - You need to cancel and reschedule your visit for any reason. Call at least 2 days before your visit if you need to cancel.   - Your insurance changes before your next visit.    (We will need to get approval from your new insurance. This can take up to two weeks.)     The [facility] is opened Monday thru Friday. We are closed on weekends and holidays.   Voice mail will take your call if the center is closed. If you leave a message please allow 24 hours for  a call back during weekdays. If you leave a message on a weekend/holiday, we will call you back the next business day.

## 2024-01-04 ENCOUNTER — TELEPHONE (OUTPATIENT)
Dept: CARDIOLOGY | Facility: HOSPITAL | Age: 89
End: 2024-01-04
Payer: MEDICARE

## 2024-01-04 ENCOUNTER — OFFICE VISIT (OUTPATIENT)
Dept: CARDIOLOGY | Facility: HOSPITAL | Age: 89
End: 2024-01-04
Payer: MEDICARE

## 2024-01-04 VITALS
HEART RATE: 68 BPM | WEIGHT: 160.2 LBS | SYSTOLIC BLOOD PRESSURE: 119 MMHG | DIASTOLIC BLOOD PRESSURE: 60 MMHG | HEIGHT: 61 IN | BODY MASS INDEX: 30.25 KG/M2

## 2024-01-04 DIAGNOSIS — I35.0 NONRHEUMATIC AORTIC VALVE STENOSIS: Primary | ICD-10-CM

## 2024-01-04 DIAGNOSIS — I10 PRIMARY HYPERTENSION: ICD-10-CM

## 2024-01-04 DIAGNOSIS — E78.00 PURE HYPERCHOLESTEROLEMIA: ICD-10-CM

## 2024-01-04 PROCEDURE — 93005 ELECTROCARDIOGRAM TRACING: CPT | Performed by: INTERNAL MEDICINE

## 2024-01-04 PROCEDURE — 3074F SYST BP LT 130 MM HG: CPT | Performed by: INTERNAL MEDICINE

## 2024-01-04 PROCEDURE — 1036F TOBACCO NON-USER: CPT | Performed by: INTERNAL MEDICINE

## 2024-01-04 PROCEDURE — 1160F RVW MEDS BY RX/DR IN RCRD: CPT | Performed by: INTERNAL MEDICINE

## 2024-01-04 PROCEDURE — 1159F MED LIST DOCD IN RCRD: CPT | Performed by: INTERNAL MEDICINE

## 2024-01-04 PROCEDURE — 93010 ELECTROCARDIOGRAM REPORT: CPT | Performed by: INTERNAL MEDICINE

## 2024-01-04 PROCEDURE — 3078F DIAST BP <80 MM HG: CPT | Performed by: INTERNAL MEDICINE

## 2024-01-04 PROCEDURE — 99205 OFFICE O/P NEW HI 60 MIN: CPT | Performed by: INTERNAL MEDICINE

## 2024-01-04 PROCEDURE — 99215 OFFICE O/P EST HI 40 MIN: CPT | Performed by: INTERNAL MEDICINE

## 2024-01-04 RX ORDER — AMOXICILLIN 500 MG/1
CAPSULE ORAL
COMMUNITY
Start: 2023-12-11

## 2024-01-04 RX ORDER — VALSARTAN AND HYDROCHLOROTHIAZIDE 80; 12.5 MG/1; MG/1
1 TABLET, FILM COATED ORAL DAILY
Qty: 30 TABLET | Refills: 11 | Status: CANCELLED | OUTPATIENT
Start: 2024-01-04 | End: 2025-01-03

## 2024-01-04 RX ORDER — VALSARTAN AND HYDROCHLOROTHIAZIDE 80; 12.5 MG/1; MG/1
1 TABLET, FILM COATED ORAL DAILY
Status: ON HOLD | COMMUNITY
End: 2024-02-23 | Stop reason: DRUGHIGH

## 2024-01-04 ASSESSMENT — ENCOUNTER SYMPTOMS
OCCASIONAL FEELINGS OF UNSTEADINESS: 1
DEPRESSION: 0
LOSS OF SENSATION IN FEET: 0

## 2024-01-04 NOTE — PROGRESS NOTES
Primary Care Physician: Bashir Smith MD  Date of Visit: 01/04/2024  9:20 AM EST  Location of visit: [facility]     Chief Complaint:   Chief Complaint   Patient presents with    Hypertension    Hyperlipidemia    Aortic Stenosis        HPI / Summary:   Josee Henson is a 88 y.o. female presents for consultation for aortic stenosis.  She is a pleasant 88-year-old white female with a past medical history significant for aortic stenosis, hypertension, hyperlipidemia, chronic kidney disease, glucose intolerance, and obesity.  She had a recent echocardiogram for surveillance of her aortic stenosis that showed severe aortic stenosis.  She does do balance exercises twice a week with a .  She is most limited by chronic back and knee discomfort.  She has more chronic dyspnea on exertion when walking prolonged distances that has not changed in the last year.  The patient denies chest pain, palpitations, lightheadedness, syncope, orthopnea, paroxysmal nocturnal dyspnea, lower extremity edema, or bleeding problems.    The patient has no prior history of coronary artery disease, cerebrovascular disease, venous thromboembolic disease, diabetes, peripheral arterial disease/claudication, or bleeding.    She has longstanding hyperlipidemia and has tolerated low-dose atorvastatin for many years without side effects.    She has a history of bilateral shoulder surgeries, left knee replacement, and right knee surgery.    She has a questionable allergy to tetracycline which she does not recall the reaction.    She smoked tobacco briefly in college but is otherwise a lifelong non-smoker.  She drinks 1 to 2 glasses of wine a week.  She denies any illicit drug use.  She is retired and a  and lives by herself.    Her sister had a stroke.  There is no family history of premature coronary disease or sudden death.      History reviewed. No pertinent past medical history.     Past Surgical History:   Procedure Laterality  Date    COLONOSCOPY  04/15/2014    Complete Colonoscopy    OTHER SURGICAL HISTORY  04/15/2014    Revision Of Total Knee Arthroplasty    TOTAL KNEE ARTHROPLASTY  04/15/2014    Total Knee Arthroplasty          Social History:   reports that she has never smoked. She has never used smokeless tobacco. She reports current alcohol use of about 1.0 standard drink of alcohol per week. She reports that she does not use drugs.     Family History:  family history includes Alzheimer's disease (age of onset: 83) in her mother; Diabetes (age of onset: 69) in her father; Heart attack in her father; Leukemia in her father; ovarian mass in her mother.      Allergies:  Allergies   Allergen Reactions    Tetracycline Rash and Unknown       Outpatient Medications:  Current Outpatient Medications   Medication Instructions    acetaminophen (Tylenol Extra Strength) 500 mg tablet 1 tablet, oral, As directed.<BR>    aspirin (Ecotrin Low Strength) 81 mg EC tablet 1 tablet, oral, Nightly    atorvastatin (LIPITOR) 10 mg, oral, Daily    biotin 5 mg capsule 1 capsule, oral, Daily    calcium carbonate-vitamin D3 (Oscal-500) 500 mg-10 mcg (400 unit) tablet 2 tablets, oral, Nightly    CAPSICUM OLEORESIN-MENTH-CAMPH TOP Topical, As directed.<BR>    denosumab (Prolia) 60 mg/mL syringe 1 mL, subcutaneous, Every 6 months    ergocalciferol (Vitamin D-2) 1.25 MG (70351 UT) capsule 1 capsule, oral, Weekly    fexofenadine (Allegra Allergy) 180 mg tablet 1 tablet, oral, As directed.<BR>    flaxseed oiL 1,000 mg capsule 1 capsule, oral, 2 times daily    fluticasone (Flonase) 50 mcg/actuation nasal spray 2 sprays, Each Nostril, Daily    ibuprofen (AdviL) 200 mg tablet 2 tablets, oral, Every 6 hours PRN    levothyroxine (Synthroid, Levoxyl) 125 mcg tablet 1 tablet, oral, Daily    mv-min-folic-K1-lycopen-lutein (Centrum Silver Ultra Men's) 229--300 mcg tablet 1 tablet, oral, Daily    omeprazole (PriLOSEC) 20 mg DR capsule 1 capsule, oral, Daily     "polyethylene glycol (MIRALAX) 17 g, oral, Daily    quiNINE (Qualaquin) 324 mg capsule 1 capsule, oral, As directed.    ubidecarenone (COENZYME Q10, BULK, MISC) 1 tablet, oral, Daily    valsartan-hydrochlorothiazide (Diovan-HCT) 160-12.5 mg tablet 1 tablet, oral, Daily    zolpidem (AMBIEN) 5 mg, oral, Nightly PRN       Physical Exam:  Vitals:    01/04/24 0935 01/04/24 0936   BP: 123/75 119/60   BP Location: Left arm Right arm   Patient Position: Sitting Sitting   BP Cuff Size: Adult Adult   Pulse: 68    Weight: 72.7 kg (160 lb 3.2 oz)    Height: 1.549 m (5' 1\")      Wt Readings from Last 5 Encounters:   01/04/24 72.7 kg (160 lb 3.2 oz)   01/03/24 73.2 kg (161 lb 7.8 oz)   11/09/23 74 kg (163 lb 2.3 oz)   07/03/23 72.7 kg (160 lb 6 oz)   06/13/23 73.2 kg (161 lb 6.4 oz)     Body mass index is 30.27 kg/m².   General: Well-developed well-nourished in no acute distress  HEENT: Normocephalic atraumatic  Neck: Supple, JVP is normal negative hepatojugular reflux 2+ carotid pulses without bruit  Pulmonary: Normal respiratory effort, clear to auscultation  Cardiovascular: No right ventricular heave, normal S1 and S2, 2 out of 6 mid-to-late peaking systolic ejection murmur at the base and apex that radiates to the carotids no rubs or gallops  Abdomen: Soft nontender nondistended  Extremities: Warm without edema 2+ radial pulses bilaterally 2+ dorsalis pedis pulses bilaterally  Neurologic: Alert and oriented x3  Psychiatric: Normal mood and affect     Last Labs:  CMP:  Recent Labs     11/30/23  1210 10/26/23  0942 06/07/23  1459 04/13/22  1435 11/24/21  0423    142 145   < > 140   K 5.0 4.5 4.6   < > 4.4    105 106   < > 105   CO2 27 27 31   < > 23*   ANIONGAP 15 15 13   < > 12   BUN 37* 38* 34*   < > 26*   CREATININE 1.63* 1.72* 1.44*   < > 1.3   EGFR 30* 28*  --   --  41   GLUCOSE 83 109* 98   < > 113*    < > = values in this interval not displayed.     Recent Labs     10/26/23  0942 06/07/23  1459 10/27/22  0955 " "  ALBUMIN 4.1 4.1 4.0   ALKPHOS 73 77 79   ALT 13 14 11   AST 19 21 18   BILITOT 0.5 0.3 0.5     CBC:  Recent Labs     10/26/23  0942 10/27/22  0955 11/24/21  0423   WBC 5.7 6.1 7.4   HGB 11.5* 11.7* 9.5*   HCT 38.0 37.2 30.0*    202 258   MCV 92 88 89.6     COAG:   Recent Labs     11/24/21  0423 12/16/20  0932   INR 1.0 1.0   DDIMERVTE  --  1,148*     HEME/ENDO:  Recent Labs     10/26/23  0942 06/07/23  1459 10/27/22  0955 08/18/20  1302 06/16/20  1253 10/21/19  0924 06/17/19  1406   TSH 5.63* 2.63 3.51   < > 0.05*   < > 0.98   HGBA1C 6.0*  --   --   --  6.4  --  6.2    < > = values in this interval not displayed.      CARDIAC: No results for input(s): \"LDH\", \"CKMB\", \"TROPHS\", \"BNP\" in the last 52658 hours.    No lab exists for component: \"CK\", \"CKMBP\"  Recent Labs     10/26/23  0942 10/27/22  0955 10/14/21  1050 10/22/20  0936   CHOL 199 173 202* 197   LDLF  --  81 97 85   LDLCALC 96  --   --   --    HDL 75.8 69.5 80.6 88.0   TRIG 134 115 120 118       Last Cardiology Tests:  ECG:  An electrocardiogram performed today that I reviewed shows normal sinus rhythm nonspecific ST-T abnormality.    Echo:  Echo Results:  Transthoracic Echo (TTE) Complete 11/30/2023    [facility], [address] [place] [address]  Tel [phone] and Fax [phone]    TRANSTHORACIC ECHOCARDIOGRAM REPORT      Patient Name:     JAIRO COOK LEONORA Chavira Physician:  [id number] Lazaro Stovall DO  Study Date:       11/30/2023          Ordering Provider:  [id number] ALAYNA CARTY  MRN/PID:          [id number]            Fellow:  Accession#:       [id number]        Nurse:  Date of           1935 / [birth year]      Sonographer:        Carlos Gutierrez RDMS  Birth/Age:        years  Gender:           F                   Additional Staff:   Lena Casanova RDCS  Height:           154.00 cm           Admit Date:  Weight:           74.00 kg            Admission Status:   Outpatient  BSA:              1.72 m2             " Encounter#:         [id number]  Department          [facility]  Location:           Invasive  Blood Pressure: 122 /78 mmHg    Study Type:    TRANSTHORACIC ECHO (TTE) COMPLETE  Diagnosis/ICD: Nonrheumatic aortic (valve) stenosis-I35.0  Indication:    AS  CPT Code:      Echo Complete w Full Doppler-05223    Patient History:  Pertinent History: HTN.    Study Detail: The following Echo studies were performed: 2D, M-Mode, Doppler and  color flow.      PHYSICIAN INTERPRETATION:  Left Ventricle: The left ventricular systolic function is normal, with an estimated ejection fraction of 60%. There are no regional wall motion abnormalities. The left ventricular cavity size is normal. Spectral Doppler shows a pseudonormal pattern of left ventricular diastolic filling.  Left Atrium: The left atrium is upper limits of normal in size.  Right Ventricle: The right ventricle is normal in size. There is normal right ventricular global systolic function.  Right Atrium: The right atrium is normal in size.  Aortic Valve: The aortic valve is probably trileaflet. There is moderate to severe aortic valve cusp calcification. There is evidence of moderate to severe aortic valve stenosis.  There is low flow across the aortic valve, with a normal ejection fraction. The aortic valve dimensionless index is 0.23. There is no evidence of aortic valve regurgitation. The peak instantaneous gradient of the aortic valve is 50.1 mmHg. The mean gradient of the aortic valve is 29.0 mmHg.  Mitral Valve: The mitral valve is mildly thickened. There is mild mitral valve regurgitation.  Tricuspid Valve: The tricuspid valve is structurally normal. No evidence of tricuspid regurgitation.  Pulmonic Valve: The pulmonic valve is structurally normal. There is no indication of pulmonic valve regurgitation.  Pericardium: There is no pericardial effusion noted.  Aorta: The aortic root is normal.  In comparison to the previous echocardiogram(s): Compared with study  from 11/8/2022, dimensionless index 0.23 down from 0.34 suggesting more severe aortic stenosis. Clinical correlation suggested.      CONCLUSIONS:  1. Left ventricular systolic function is normal with a 60% estimated ejection fraction.  2. Spectral Doppler shows a pseudonormal pattern of left ventricular diastolic filling.  3. Moderate to severe aortic valve stenosis. pk/mn 50/29 mmHg, DI 0.23, ADITYA 0.6 suggests low flow low gradient severe type.  4. There is moderate to severe aortic valve cusp calcification.  5. Compared with study from 11/8/2022, dimensionless index 0.23 down from 0.34 suggesting more severe aortic stenosis. Clinical correlation suggested.    QUANTITATIVE DATA SUMMARY:  2D MEASUREMENTS:  Normal Ranges:  IVSd:          1.50 cm    (0.6-1.1cm)  LVPWd:         1.40 cm    (0.6-1.1cm)  LVIDd:         4.00 cm    (3.9-5.9cm)  LVIDs:         2.70 cm  LV Mass Index: 127.9 g/m2  LV % FS        32.5 %    LA VOLUME:  Normal Ranges:  LA Vol A4C:        65.0 ml    (22+/-6mL/m2)  LA Vol A2C:        55.9 ml  LA Vol BP:         63.0 ml  LA Vol Index A4C:  37.7ml/m2  LA Vol Index A2C:  32.4 ml/m2  LA Vol Index BP:   36.5 ml/m2  LA Area A4C:       19.5 cm2  LA Area A2C:       18.9 cm2  LA Major Axis A4C: 5.0 cm  LA Major Axis A2C: 5.4 cm  LA Volume Index:   36.5 ml/m2    RA VOLUME BY A/L METHOD:  Normal Ranges:  RA Vol A4C:        35.2 ml    (8.3-19.5ml)  RA Vol Index A4C:  20.4 ml/m2  RA Area A4C:       15.3 cm2  RA Major Axis A4C: 5.7 cm    M-MODE MEASUREMENTS:  Normal Ranges:  Ao Root: 2.50 cm (2.0-3.7cm)  LAs:     5.20 cm (2.7-4.0cm)    AORTA MEASUREMENTS:  Normal Ranges:  Ao Sinus, d: 2.57 cm (2.1-3.5cm)  Ao STJ, d:   1.83 cm (1.7-3.4cm)  Asc Ao, d:   3.20 cm (2.1-3.4cm)    LV SYSTOLIC FUNCTION BY 2D PLANIMETRY (MOD):  Normal Ranges:  EF-A4C View: 66.7 % (>=55%)  EF-A2C View: 69.9 %  EF-Biplane:  69.3 %    LV DIASTOLIC FUNCTION:  Normal Ranges:  MV Peak E:        0.66 m/s    (0.7-1.2 m/s)  MV Peak A:        0.71  m/s    (0.42-0.7 m/s)  E/A Ratio:        0.92        (1.0-2.2)  MV lateral e'     0.13 m/s  MV medial e'      0.06 m/s  E/e' Ratio:       5.00        (<8.0)  PulmV Sys Biju:    60.20 cm/s  PulmV Galan Biju:   65.20 cm/s  PulmV S/D Biju:    0.90  PulmV A Revs Biju: 20.70 cm/s  PulmV A Revs Dur: 109.00 msec    MITRAL VALVE:  Normal Ranges:  MV DT: 387 msec (150-240msec)    AORTIC VALVE:  Normal Ranges:  AoV Vmax:                3.54 m/s  (<=1.7m/s)  AoV Peak P.1 mmHg (<20mmHg)  AoV Mean P.0 mmHg (1.7-11.5mmHg)  LVOT Max Biju:            0.94 m/s  (<=1.1m/s)  AoV VTI:                 90.80 cm  (18-25cm)  LVOT VTI:                21.20 cm  LVOT Diameter:           1.74 cm   (1.8-2.4cm)  AoV Area, VTI:           0.56 cm2  (2.5-5.5cm2)  AoV Area,Vmax:           0.63 cm2  (2.5-4.5cm2)  AoV Dimensionless Index: 0.23      RIGHT VENTRICLE:  RV Basal 3.14 cm  RV Mid   2.37 cm  RV Major 4.8 cm  TAPSE:   23.3 mm  RV s'    0.13 m/s    TRICUSPID VALVE/RVSP:  Normal Ranges:  Peak TR Velocity: 2.88 m/s  Est. RA Pressure: 3 mmHg  RV Syst Pressure: 36.2 mmHg (< 30mmHg)  IVC Diam:         1.06 cm    PULMONIC VALVE:  Normal Ranges:  PV Max Biju: 1.8 m/s   (0.6-0.9m/s)  PV Max P.7 mmHg    Pulmonary Veins:  PulmV A Revs Dur: 109.00 msec  PulmV A Revs Biju: 20.70 cm/s  PulmV Galan Biju:   65.20 cm/s  PulmV S/D Biju:    0.90  PulmV Sys Biju:    60.20 cm/s      [id number] Stovall DO  Electronically signed on 2023 at 5:12:13 PM        ** Final **  I reviewed her echocardiogram images and report.  By my measurements the aortic stenosis is severe with a peak velocity of 4.3 m/s and a mean gradient of 42 mmHg with a peak gradient of 73 mmHg.    Cath:      Stress Test:  Stress Results:  No results found for this or any previous visit from the past 365 days.         Cardiac Imaging:  Carotid ultrasound 2023  CONCLUSIONS:  Right Carotid: Findings are consistent with less than 50% stenosis of the right  proximal internal carotid artery. Laminar flow seen by color Doppler. Right external carotid artery appears patent with no evidence of stenosis. The right vertebral artery is patent with antegrade flow. No evidence of hemodynamically significant stenosis in the right subclavian artery.  Left Carotid: Findings are consistent with less than 50% stenosis of the left proximal internal carotid artery. Laminar flow seen by color Doppler. Left external carotid artery appears patent with no evidence of stenosis. The left vertebral artery is patent with antegrade flow. No evidence of hemodynamically significant stenosis in the left subclavian artery.      CT angio chest for PE December 16, 2020  HEART AND VESSELS:  No discrete filling defects within the main pulmonary artery or its  branches.     Main pulmonary artery is mildly dilated at 3.4 cm, chronic pulmonary  hypertension cannot be excluded.     No aortic aneurysm or dissection.     The study is not optimized for evaluation of coronary arteries.     The cardiac chambers are not enlarged.     No evidence of pericardial effusion.      Assessment/Plan   Diagnoses and all orders for this visit:  Nonrheumatic aortic valve stenosis  -     Referral to Valve and Structural Heart Program; Future  Pure hypercholesterolemia  -     ECG 12 lead (Clinic Performed)  Primary hypertension    In summary Ms. Henson is a pleasant 88-year-old white female with a past medical history significant for aortic stenosis, hypertension, hyperlipidemia, chronic kidney disease, glucose intolerance, and obesity.  She does have more chronic dyspnea on exertion when walking prolonged distances.  Her echocardiogram is consistent with severe aortic stenosis.  I did refer her to the TAVR team for consideration.  I recommended additional evaluation with a right and left heart catheterization.  She will require hydration prior to her catheterization.  Her blood pressure and lipids are acceptable.  I advised  her to avoid using any NSAIDs.  She should continue her current cardiovascular medications.  We will see her back in follow-up in 2 months.      Orders:  Orders Placed This Encounter   Procedures    Referral to Valve and Structural Heart Program    ECG 12 lead (Clinic Performed)      Followup Appts:  Future Appointments   Date Time Provider Department Center   1/8/2024  8:00 AM [facility] CAIC CT 1 CMCCAICCT [facility] Rad [facility]   1/8/2024 10:30 AM Braydon Correa MD [id number] [facility]   1/8/2024 11:00 AM Cody Khanna MD [id number] [facility]   3/7/2024 11:00 AM Elisa Hahn, APRN-CNP [id number] [facility]   6/13/2024 11:00 AM Leonila Bocanegra MD [id number] [facility]   7/9/2024  1:00 [facility]           ____________________________________________________________  Pérez Traylor MD  [facility]  [facility]

## 2024-01-04 NOTE — PATIENT INSTRUCTIONS
Referral to [facility] heart [facility].  Schedule heart catheterization.  Do not take valsartan/hydrochlorothiazide the morning of your procedure.  Do not take ibuprofen.  Follow-up in 2 months.

## 2024-01-05 DIAGNOSIS — I35.0 NONRHEUMATIC AORTIC VALVE STENOSIS: Primary | ICD-10-CM

## 2024-01-05 RX ORDER — SODIUM CHLORIDE 9 MG/ML
300 INJECTION, SOLUTION INTRAVENOUS ONCE
Status: COMPLETED | OUTPATIENT
Start: 2024-01-05 | End: 2024-01-08

## 2024-01-07 ENCOUNTER — PREP FOR PROCEDURE (OUTPATIENT)
Dept: CARDIOLOGY | Facility: HOSPITAL | Age: 89
End: 2024-01-07
Payer: MEDICARE

## 2024-01-07 DIAGNOSIS — I35.0 NONRHEUMATIC AORTIC (VALVE) STENOSIS: Primary | ICD-10-CM

## 2024-01-07 RX ORDER — SODIUM CHLORIDE 9 MG/ML
100 INJECTION, SOLUTION INTRAVENOUS CONTINUOUS
Status: CANCELLED | OUTPATIENT
Start: 2024-01-07

## 2024-01-08 ENCOUNTER — HOSPITAL ENCOUNTER (OUTPATIENT)
Dept: RADIOLOGY | Facility: HOSPITAL | Age: 89
Discharge: HOME | End: 2024-01-08
Payer: MEDICARE

## 2024-01-08 ENCOUNTER — APPOINTMENT (OUTPATIENT)
Dept: RADIOLOGY | Facility: HOSPITAL | Age: 89
End: 2024-01-08
Payer: MEDICARE

## 2024-01-08 ENCOUNTER — OFFICE VISIT (OUTPATIENT)
Dept: CARDIAC SURGERY | Facility: HOSPITAL | Age: 89
End: 2024-01-08
Payer: MEDICARE

## 2024-01-08 ENCOUNTER — OFFICE VISIT (OUTPATIENT)
Dept: CARDIOLOGY | Facility: HOSPITAL | Age: 89
End: 2024-01-08
Payer: MEDICARE

## 2024-01-08 VITALS
DIASTOLIC BLOOD PRESSURE: 83 MMHG | HEIGHT: 61 IN | BODY MASS INDEX: 30.21 KG/M2 | SYSTOLIC BLOOD PRESSURE: 147 MMHG | WEIGHT: 160 LBS | OXYGEN SATURATION: 92 % | HEART RATE: 52 BPM

## 2024-01-08 DIAGNOSIS — I35.0 NONRHEUMATIC AORTIC VALVE STENOSIS: Primary | ICD-10-CM

## 2024-01-08 DIAGNOSIS — I35.0 NONRHEUMATIC AORTIC VALVE STENOSIS: ICD-10-CM

## 2024-01-08 PROCEDURE — 1036F TOBACCO NON-USER: CPT | Performed by: THORACIC SURGERY (CARDIOTHORACIC VASCULAR SURGERY)

## 2024-01-08 PROCEDURE — 1036F TOBACCO NON-USER: CPT | Performed by: INTERNAL MEDICINE

## 2024-01-08 PROCEDURE — 99214 OFFICE O/P EST MOD 30 MIN: CPT | Performed by: INTERNAL MEDICINE

## 2024-01-08 PROCEDURE — 1126F AMNT PAIN NOTED NONE PRSNT: CPT | Performed by: THORACIC SURGERY (CARDIOTHORACIC VASCULAR SURGERY)

## 2024-01-08 PROCEDURE — 99215 OFFICE O/P EST HI 40 MIN: CPT | Mod: GC | Performed by: THORACIC SURGERY (CARDIOTHORACIC VASCULAR SURGERY)

## 2024-01-08 PROCEDURE — 2500000004 HC RX 250 GENERAL PHARMACY W/ HCPCS (ALT 636 FOR OP/ED): Performed by: NURSE PRACTITIONER

## 2024-01-08 PROCEDURE — 1126F AMNT PAIN NOTED NONE PRSNT: CPT | Performed by: INTERNAL MEDICINE

## 2024-01-08 PROCEDURE — 74174 CTA ABD&PLVS W/CONTRAST: CPT

## 2024-01-08 PROCEDURE — 74174 CTA ABD&PLVS W/CONTRAST: CPT | Performed by: RADIOLOGY

## 2024-01-08 PROCEDURE — 2550000001 HC RX 255 CONTRASTS: Performed by: INTERNAL MEDICINE

## 2024-01-08 PROCEDURE — 99205 OFFICE O/P NEW HI 60 MIN: CPT | Performed by: THORACIC SURGERY (CARDIOTHORACIC VASCULAR SURGERY)

## 2024-01-08 PROCEDURE — 3077F SYST BP >= 140 MM HG: CPT | Performed by: INTERNAL MEDICINE

## 2024-01-08 PROCEDURE — 71275 CT ANGIOGRAPHY CHEST: CPT | Performed by: RADIOLOGY

## 2024-01-08 PROCEDURE — 1159F MED LIST DOCD IN RCRD: CPT | Performed by: INTERNAL MEDICINE

## 2024-01-08 PROCEDURE — 1159F MED LIST DOCD IN RCRD: CPT | Performed by: THORACIC SURGERY (CARDIOTHORACIC VASCULAR SURGERY)

## 2024-01-08 PROCEDURE — 3079F DIAST BP 80-89 MM HG: CPT | Performed by: INTERNAL MEDICINE

## 2024-01-08 RX ADMIN — SODIUM CHLORIDE 300 ML/HR: 9 INJECTION, SOLUTION INTRAVENOUS at 08:15

## 2024-01-08 RX ADMIN — IOHEXOL 50 ML: 350 INJECTION, SOLUTION INTRAVENOUS at 11:35

## 2024-01-08 ASSESSMENT — PAIN SCALES - GENERAL: PAINLEVEL: 0-NO PAIN

## 2024-01-08 NOTE — PROGRESS NOTES
[facility]Q Questionnaire      1  Heart failure affects different people in different ways. Some feel shortness of breath while others feel fatigue. Please indicate how much you are limited by heart failure (shortness of breath or fatigue) in your ability to do the following activities over the past 2 weeks. PRE PROCEDURE    A.) Showering/bathing  6. Limited for other reastons  B.) Walking 1 block on level ground 3. Moderately  C.) Hurrying or Jogging   2. Quite a bit    2.  Over the past 2 weeks, how many times did you have swelling in your feet, ankles or legs when you woke up in the morning? 5. Never    3.  Over the past 2 weeks, on average, how many times has fatigue limited your ability to do what you wanted? 5. 1-2 times a week    4.  Over the past 2 weeks, on average, how many times has shortness of breath limited your ability to do what you wanted? 6. Less than once a week    5.  Over the past 2 weeks, on average, how many times have you been forced to sleep sitting up in a chair or with at least 3 pillows to prop you up because of shortness of breath? Every night or Never    6. Over the past 2 weeks, how much has your heart failure limited your enjoyment of life? It has slightly limited my enjoyment of life    7. If you had to spend the rest of your life with your heart failure the way it is right now, how would you feel about this? 4. Mostly satisfied    8. How much does your heart failure affect your lifestyle? Please indicate how your heart failure may have limited yourparticipation in the following activities over the past 2 weeks    A.)  Hobbies, recreational activities  4. Slightly limited    B.) Working or doing household chores  3. Moderately limited    C.) Visiting family or friends out of your home  5. Did not limit at all    5 Meter Walk unable seconds

## 2024-01-09 NOTE — PROGRESS NOTES
Cardio: [facility]      HPI: 88-year-old white female with a past medical history significant for aortic stenosis, hypertension, hyperlipidemia, chronic kidney disease, glucose intolerance, and obesity was referred to us for aortic stenosis.  Patient states that she has symptoms of tiredness and shortness of breath for last 2 years or so but also has back pain and leg pain which limits her walking.  She endorses increasing fatigue over the last year or so but still able to do her activities like grocery walking slowly etc.  Patient uses cane for longer distances.    She does do balance exercises twice a week with a .   She has more chronic dyspnea on exertion when walking prolonged distances that has not changed in the last year.  The patient denies chest pain, palpitations, lightheadedness, syncope, orthopnea, paroxysmal nocturnal dyspnea, lower extremity edema, or bleeding problems.     The patient has no prior history of coronary artery disease, cerebrovascular disease, venous thromboembolic disease, diabetes, peripheral arterial disease/claudication, or bleeding.           ROS:    Constitutional: fatigue  Eyes: no eyesight problems, no blurred vision, no diplopia, no eye pain, no purulent discharge from the eyes, eyes not red, no dryness of the eyes and no itching of the eyes.   ENT: no nosebleeds, no hearing loss, no tinnitus, no earache, no sore throat, no hoarseness, no swollen glands in the neck and no nasal discharge.   Cardiovascular: dyspnea on exertion, no chest pain  Respiratory: no chronic cough, not coughing up sputum, no wheezing that is consistent with asthma  Gastrointestinal: no change in bowel habits, no blood in stools, no diarrhea, no constipation, no nausea, no vomiting, no abdominal pain, no signs and symptoms of ulcer disease, no sybil colored stools and no intolerance to fatty foods.   Genitourinary: no hematuria,  no urinary frequency, no dysuria, no incontinence, no burning sensation  during urination, no urinary hesitancy, no nocturia, no genital lesion, no testicular pain, urinary stream is not smaller and urinary stream does not start and stop.   Musculoskeletal: no arthralgias, no myalgias, no joint swelling, no joint stiffness, no muscle weakness, no back pain, no limb pain, no limb swelling and no difficulty walking.   Skin: no skin rashes, no change in skin color and pigmentation, no skin lesions and no skin lumps.   Neurological: no seizures, no frequent falls, no headaches, no dizziness, no tingling, no fainting and no limb weakness.   Psychiatric: no depression, not suicidal, no confusion, no memory lapses or loss, no anxiety, no personality change and no emotional problems.   Endocrine: no goiter, no thyroid disorder, no diabetes mellitus, no excessive thirst, no dry skin, no cold intolerance, no heat intolerance, no erectile dysfunction, no increased urinary frequency, no proptosis and no deepening of the voice.   Hematologic/Lymphatic: no bleeding issues.   All other systems have been reviewed and are negative for complaint.       TAVR Workup:   - NYHA: 2  - Frailty 3/5:   - EKG: Normal sinus rhythm, QRS 90 ms   - TTE: 11/30/2023 EF 60%, dimensionless index 0.23 peak gradient 50 mmHg mean gradient 29 mmHg, aortic valve area 0.56 cm² V-max 3.5 m/s  There is low flow across the aortic valve, with a normal ejection fraction.   - CT TAVR: Pending  - LHC: Pending  - dental clearance: Pending    - STS  Procedure Type: Isolated AVR  Perioperative Outcome Estimate %  Operative Mortality 7.71%  Morbidity & Mortality 13.3%  Stroke 1.9%  Renal Failure 3.94%  Reoperation 3.14%  Prolonged Ventilation 7.37%  Deep Sternal Wound Infection 0.046%  Long Hospital Stay (>14 days) 7.14%  Short Hospital Stay (<6 days)* 24.3%      Physical Exam:  Constitutional: alert and in no acute distress.   Eyes: no erythema, swelling or discharge from the eye .   ENT: no erythema, edema, exudate or lesions .   Neck:  "neck is supple, no JVD  Pulmonary: no increased work of breathing or signs of respiratory distress , lungs clear to auscultation. , normal percussion of chest  and chest palpation normal .   Cardiovascular: RRR, 3/6 AMPARO RUSB,  no pitting edema  Abdomen: abdomen non-tender, no masses  and no hepatomegaly .   Neurologic: non-focal neurologic examination.           6/13/2023    11:20 AM 7/3/2023     1:10 PM 11/9/2023    11:21 AM 1/3/2024     1:12 PM 1/4/2024     9:35 AM 1/4/2024     9:36 AM 1/8/2024     9:05 AM   Vitals   Systolic 130 122 122 151 123 119 147   Diastolic 68 72 78 68 75 60 83   Heart Rate 70 56 78 58 68  52   Temp  36.7 °C (98 °F)  36.2 °C (97.2 °F)      Resp 16 18  16      Height (in) 1.549 m (5' 1\")  1.54 m (5' 0.63\")  1.549 m (5' 1\")  1.549 m (5' 1\")   Weight (lb) 161.4 160.38 163.14 161.49 160.2  160   BMI 30.5 kg/m2 30.3 kg/m2 31.2 kg/m2 30.89 kg/m2 30.27 kg/m2  30.23 kg/m2   BSA (m2) 1.77 m2 1.77 m2 1.78 m2 1.77 m2 1.77 m2  1.77 m2   Visit Report   Report  Report Report Report    Report        Current Outpatient Medications   Medication Instructions    acetaminophen (Tylenol Extra Strength) 500 mg tablet 1 tablet, oral, As directed.<BR>    amoxicillin (Amoxil) 500 mg capsule TAKE 4 CAPSULES BY MOUTH 1 HOUR BEFORE PROCEDURE    aspirin (Ecotrin Low Strength) 81 mg EC tablet 1 tablet, oral, Nightly    atorvastatin (LIPITOR) 10 mg, oral, Daily    biotin 5 mg capsule 1 capsule, oral, Daily    calcium carbonate-vitamin D3 (Oscal-500) 500 mg-10 mcg (400 unit) tablet 2 tablets, oral, Nightly    denosumab (Prolia) 60 mg/mL syringe 1 mL, subcutaneous, Every 6 months    ergocalciferol (Vitamin D-2) 1.25 MG (19185 UT) capsule 1 capsule, oral, Weekly    fexofenadine (Allegra Allergy) 180 mg tablet 1 tablet, oral, As directed.<BR>    flaxseed oiL 1,000 mg capsule 1 capsule, oral, 2 times daily    fluticasone (Flonase) 50 mcg/actuation nasal spray 2 sprays, Each Nostril, Daily    ibuprofen (AdviL) 200 mg tablet 2 " tablets, oral, Every 6 hours PRN    levothyroxine (Synthroid, Levoxyl) 125 mcg tablet 1 tablet, oral, Daily    mv-min-folic-K1-lycopen-lutein (Centrum Silver Ultra Men's) 306--300 mcg tablet 1 tablet, oral, Daily    omeprazole (PriLOSEC) 20 mg DR capsule 1 capsule, oral, Daily    quiNINE (Qualaquin) 324 mg capsule 1 capsule, oral, As directed.    valsartan-hydrochlorothiazide (Diovan-HCT) 80-12.5 mg tablet 1 tablet, oral, Daily    zolpidem (AMBIEN) 5 mg, oral, Nightly PRN        Impression:   This is a 88-year-old female with past medical history of hypertension who is symptomatic from her aortic stenosis.  Although the peak velocity is 3.54 m/s with mean gradient of 29 mmHg, is concern for low flow across the aortic valve despite normal LV function    Plan:   -Patient will get a CT TAVR  -Patient will also need a left heart cath      We discussed all the risks associated with the procedure, including but not limited to stroke, MI, pericardial tamponade, vascular complications, infection and death were discussed with the patient. The risk of needing a permament pacemaker was also discussed in detail. The patient verbalized understanding and decided to proceed with the procedure.     We will discuss this patient's case at our Valve Team meeting with representatives from [facility]. Our nurse navigators will contact patient with further diagnostic needs and formal plan.

## 2024-01-09 NOTE — PROGRESS NOTES
Cardio: [facility]      HPI: 88-year-old white female with a past medical history significant for aortic stenosis, hypertension, hyperlipidemia, chronic kidney disease, glucose intolerance, and obesity was referred to us for aortic stenosis.  Patient states that she has symptoms of tiredness and shortness of breath for last 2 years or so but also has back pain and leg pain which limits her walking.  She endorses increasing fatigue over the last year or so but still able to do her activities like grocery walking slowly etc.  Patient uses cane for longer distances.    She does do balance exercises twice a week with a .   She has more chronic dyspnea on exertion when walking prolonged distances that has not changed in the last year.  The patient denies chest pain, palpitations, lightheadedness, syncope, orthopnea, paroxysmal nocturnal dyspnea, lower extremity edema, or bleeding problems.     The patient has no prior history of coronary artery disease, cerebrovascular disease, venous thromboembolic disease, diabetes, peripheral arterial disease/claudication, or bleeding.           ROS:    Constitutional: fatigue  Eyes: no eyesight problems, no blurred vision, no diplopia, no eye pain, no purulent discharge from the eyes, eyes not red, no dryness of the eyes and no itching of the eyes.   ENT: no nosebleeds, no hearing loss, no tinnitus, no earache, no sore throat, no hoarseness, no swollen glands in the neck and no nasal discharge.   Cardiovascular: dyspnea on exertion, no chest pain  Respiratory: no chronic cough, not coughing up sputum, no wheezing that is consistent with asthma  Gastrointestinal: no change in bowel habits, no blood in stools, no diarrhea, no constipation, no nausea, no vomiting, no abdominal pain, no signs and symptoms of ulcer disease, no sybil colored stools and no intolerance to fatty foods.   Genitourinary: no hematuria,  no urinary frequency, no dysuria, no incontinence, no burning sensation  during urination, no urinary hesitancy, no nocturia, no genital lesion, no testicular pain, urinary stream is not smaller and urinary stream does not start and stop.   Musculoskeletal: no arthralgias, no myalgias, no joint swelling, no joint stiffness, no muscle weakness, no back pain, no limb pain, no limb swelling and no difficulty walking.   Skin: no skin rashes, no change in skin color and pigmentation, no skin lesions and no skin lumps.   Neurological: no seizures, no frequent falls, no headaches, no dizziness, no tingling, no fainting and no limb weakness.   Psychiatric: no depression, not suicidal, no confusion, no memory lapses or loss, no anxiety, no personality change and no emotional problems.   Endocrine: no goiter, no thyroid disorder, no diabetes mellitus, no excessive thirst, no dry skin, no cold intolerance, no heat intolerance, no erectile dysfunction, no increased urinary frequency, no proptosis and no deepening of the voice.   Hematologic/Lymphatic: no bleeding issues.   All other systems have been reviewed and are negative for complaint.       TAVR Workup:   - NYHA: 2  - Frailty 3/5:   - EKG: Normal sinus rhythm, QRS 90 ms   - TTE: 11/30/2023 EF 60%, dimensionless index 0.23 peak gradient 50 mmHg mean gradient 29 mmHg, aortic valve area 0.56 cm² V-max 3.5 m/s  There is low flow across the aortic valve, with a normal ejection fraction.   - CT TAVR: Pending  - LHC: Pending  - dental clearance: Pending    - STS  Procedure Type: Isolated AVR  Perioperative Outcome Estimate %  Operative Mortality 7.71%  Morbidity & Mortality 13.3%  Stroke 1.9%  Renal Failure 3.94%  Reoperation 3.14%  Prolonged Ventilation 7.37%  Deep Sternal Wound Infection 0.046%  Long Hospital Stay (>14 days) 7.14%  Short Hospital Stay (<6 days)* 24.3%      Physical Exam:  Constitutional: alert and in no acute distress.   Eyes: no erythema, swelling or discharge from the eye .   ENT: no erythema, edema, exudate or lesions .   Neck:  "neck is supple, no JVD  Pulmonary: no increased work of breathing or signs of respiratory distress , lungs clear to auscultation. , normal percussion of chest  and chest palpation normal .   Cardiovascular: RRR, 3/6 AMPARO RUSB,  no pitting edema  Abdomen: abdomen non-tender, no masses  and no hepatomegaly .   Neurologic: non-focal neurologic examination.           6/13/2023    11:20 AM 7/3/2023     1:10 PM 11/9/2023    11:21 AM 1/3/2024     1:12 PM 1/4/2024     9:35 AM 1/4/2024     9:36 AM 1/8/2024     9:05 AM   Vitals   Systolic 130 122 122 151 123 119 147   Diastolic 68 72 78 68 75 60 83   Heart Rate 70 56 78 58 68  52   Temp  36.7 °C (98 °F)  36.2 °C (97.2 °F)      Resp 16 18  16      Height (in) 1.549 m (5' 1\")  1.54 m (5' 0.63\")  1.549 m (5' 1\")  1.549 m (5' 1\")   Weight (lb) 161.4 160.38 163.14 161.49 160.2  160   BMI 30.5 kg/m2 30.3 kg/m2 31.2 kg/m2 30.89 kg/m2 30.27 kg/m2  30.23 kg/m2   BSA (m2) 1.77 m2 1.77 m2 1.78 m2 1.77 m2 1.77 m2  1.77 m2   Visit Report   Report  Report Report Report    Report        Current Outpatient Medications   Medication Instructions    acetaminophen (Tylenol Extra Strength) 500 mg tablet 1 tablet, oral, As directed.<BR>    amoxicillin (Amoxil) 500 mg capsule TAKE 4 CAPSULES BY MOUTH 1 HOUR BEFORE PROCEDURE    aspirin (Ecotrin Low Strength) 81 mg EC tablet 1 tablet, oral, Nightly    atorvastatin (LIPITOR) 10 mg, oral, Daily    biotin 5 mg capsule 1 capsule, oral, Daily    calcium carbonate-vitamin D3 (Oscal-500) 500 mg-10 mcg (400 unit) tablet 2 tablets, oral, Nightly    denosumab (Prolia) 60 mg/mL syringe 1 mL, subcutaneous, Every 6 months    ergocalciferol (Vitamin D-2) 1.25 MG (49191 UT) capsule 1 capsule, oral, Weekly    fexofenadine (Allegra Allergy) 180 mg tablet 1 tablet, oral, As directed.<BR>    flaxseed oiL 1,000 mg capsule 1 capsule, oral, 2 times daily    fluticasone (Flonase) 50 mcg/actuation nasal spray 2 sprays, Each Nostril, Daily    ibuprofen (AdviL) 200 mg tablet 2 " tablets, oral, Every 6 hours PRN    levothyroxine (Synthroid, Levoxyl) 125 mcg tablet 1 tablet, oral, Daily    mv-min-folic-K1-lycopen-lutein (Centrum Silver Ultra Men's) 992--300 mcg tablet 1 tablet, oral, Daily    omeprazole (PriLOSEC) 20 mg DR capsule 1 capsule, oral, Daily    quiNINE (Qualaquin) 324 mg capsule 1 capsule, oral, As directed.    valsartan-hydrochlorothiazide (Diovan-HCT) 80-12.5 mg tablet 1 tablet, oral, Daily    zolpidem (AMBIEN) 5 mg, oral, Nightly PRN        Impression:   This is a 88-year-old female with past medical history of hypertension who is symptomatic from her aortic stenosis.  Although the peak velocity is 3.54 m/s with mean gradient of 29 mmHg, is concern for low flow across the aortic valve despite normal LV function    Plan:   -Patient will get a CT TAVR  -Patient will also need a left heart cath      We discussed all the risks associated with the procedure, including but not limited to stroke, MI, pericardial tamponade, vascular complications, infection and death were discussed with the patient. The risk of needing a permament pacemaker was also discussed in detail. The patient verbalized understanding and decided to proceed with the procedure.     We will discuss this patient's case at our Valve Team meeting with representatives from [facility]. Our nurse navigators will contact patient with further diagnostic needs and formal plan.

## 2024-02-02 ENCOUNTER — HOSPITAL ENCOUNTER (OUTPATIENT)
Facility: HOSPITAL | Age: 89
Setting detail: OUTPATIENT SURGERY
Discharge: HOME | End: 2024-02-02
Attending: INTERNAL MEDICINE | Admitting: INTERNAL MEDICINE
Payer: MEDICARE

## 2024-02-02 VITALS — WEIGHT: 160 LBS | OXYGEN SATURATION: 95 % | BODY MASS INDEX: 30.23 KG/M2

## 2024-02-02 DIAGNOSIS — I35.0 NONRHEUMATIC AORTIC (VALVE) STENOSIS: Primary | ICD-10-CM

## 2024-02-02 DIAGNOSIS — I10 HYPERTENSION, UNSPECIFIED TYPE: ICD-10-CM

## 2024-02-02 DIAGNOSIS — I25.10 CAD (CORONARY ARTERY DISEASE): ICD-10-CM

## 2024-02-02 LAB
ANION GAP SERPL CALC-SCNC: 14 MMOL/L (ref 10–20)
BUN SERPL-MCNC: 31 MG/DL (ref 6–23)
CALCIUM SERPL-MCNC: 9.8 MG/DL (ref 8.6–10.3)
CHLORIDE SERPL-SCNC: 103 MMOL/L (ref 98–107)
CO2 SERPL-SCNC: 26 MMOL/L (ref 21–32)
CREAT SERPL-MCNC: 1.58 MG/DL (ref 0.5–1.05)
EGFRCR SERPLBLD CKD-EPI 2021: 31 ML/MIN/1.73M*2
ERYTHROCYTE [DISTWIDTH] IN BLOOD BY AUTOMATED COUNT: 14.6 % (ref 11.5–14.5)
GLUCOSE SERPL-MCNC: 114 MG/DL (ref 74–99)
HCT VFR BLD AUTO: 36.6 % (ref 36–46)
HGB BLD-MCNC: 12 G/DL (ref 12–16)
MCH RBC QN AUTO: 29.1 PG (ref 26–34)
MCHC RBC AUTO-ENTMCNC: 32.8 G/DL (ref 32–36)
MCV RBC AUTO: 89 FL (ref 80–100)
NRBC BLD-RTO: 0 /100 WBCS (ref 0–0)
PLATELET # BLD AUTO: 214 X10*3/UL (ref 150–450)
POTASSIUM SERPL-SCNC: 4.1 MMOL/L (ref 3.5–5.3)
RBC # BLD AUTO: 4.13 X10*6/UL (ref 4–5.2)
SODIUM SERPL-SCNC: 139 MMOL/L (ref 136–145)
WBC # BLD AUTO: 6 X10*3/UL (ref 4.4–11.3)

## 2024-02-02 PROCEDURE — 99152 MOD SED SAME PHYS/QHP 5/>YRS: CPT | Performed by: INTERNAL MEDICINE

## 2024-02-02 PROCEDURE — 2500000004 HC RX 250 GENERAL PHARMACY W/ HCPCS (ALT 636 FOR OP/ED): Performed by: INTERNAL MEDICINE

## 2024-02-02 PROCEDURE — 7100000010 HC PHASE TWO TIME - EACH INCREMENTAL 1 MINUTE: Performed by: INTERNAL MEDICINE

## 2024-02-02 PROCEDURE — C1894 INTRO/SHEATH, NON-LASER: HCPCS | Performed by: INTERNAL MEDICINE

## 2024-02-02 PROCEDURE — 2550000001 HC RX 255 CONTRASTS: Performed by: INTERNAL MEDICINE

## 2024-02-02 PROCEDURE — 85027 COMPLETE CBC AUTOMATED: CPT | Performed by: NURSE PRACTITIONER

## 2024-02-02 PROCEDURE — 2500000005 HC RX 250 GENERAL PHARMACY W/O HCPCS: Performed by: INTERNAL MEDICINE

## 2024-02-02 PROCEDURE — 36415 COLL VENOUS BLD VENIPUNCTURE: CPT | Performed by: NURSE PRACTITIONER

## 2024-02-02 PROCEDURE — 93458 L HRT ARTERY/VENTRICLE ANGIO: CPT | Performed by: INTERNAL MEDICINE

## 2024-02-02 PROCEDURE — 7100000009 HC PHASE TWO TIME - INITIAL BASE CHARGE: Performed by: INTERNAL MEDICINE

## 2024-02-02 PROCEDURE — 80048 BASIC METABOLIC PNL TOTAL CA: CPT | Performed by: NURSE PRACTITIONER

## 2024-02-02 PROCEDURE — 93454 CORONARY ARTERY ANGIO S&I: CPT | Performed by: INTERNAL MEDICINE

## 2024-02-02 PROCEDURE — 99222 1ST HOSP IP/OBS MODERATE 55: CPT | Performed by: NURSE PRACTITIONER

## 2024-02-02 PROCEDURE — 2720000007 HC OR 272 NO HCPCS: Performed by: INTERNAL MEDICINE

## 2024-02-02 PROCEDURE — C1769 GUIDE WIRE: HCPCS | Performed by: INTERNAL MEDICINE

## 2024-02-02 RX ORDER — LIDOCAINE HYDROCHLORIDE 10 MG/ML
INJECTION, SOLUTION EPIDURAL; INFILTRATION; INTRACAUDAL; PERINEURAL AS NEEDED
Status: DISCONTINUED | OUTPATIENT
Start: 2024-02-02 | End: 2024-02-02 | Stop reason: HOSPADM

## 2024-02-02 RX ORDER — ACETAMINOPHEN 160 MG/5ML
650 SOLUTION ORAL EVERY 6 HOURS PRN
Status: DISCONTINUED | OUTPATIENT
Start: 2024-02-02 | End: 2024-02-02 | Stop reason: HOSPADM

## 2024-02-02 RX ORDER — ACETAMINOPHEN 650 MG/1
650 SUPPOSITORY RECTAL EVERY 6 HOURS PRN
Status: DISCONTINUED | OUTPATIENT
Start: 2024-02-02 | End: 2024-02-02 | Stop reason: HOSPADM

## 2024-02-02 RX ORDER — SODIUM CHLORIDE 9 MG/ML
100 INJECTION, SOLUTION INTRAVENOUS CONTINUOUS
Status: DISCONTINUED | OUTPATIENT
Start: 2024-02-02 | End: 2024-02-02

## 2024-02-02 RX ORDER — ATORVASTATIN CALCIUM 40 MG/1
40 TABLET, FILM COATED ORAL DAILY
Qty: 90 TABLET | Refills: 0 | Status: SHIPPED | OUTPATIENT
Start: 2024-02-02 | End: 2024-05-07 | Stop reason: SDUPTHER

## 2024-02-02 RX ORDER — FENTANYL CITRATE 50 UG/ML
INJECTION, SOLUTION INTRAMUSCULAR; INTRAVENOUS AS NEEDED
Status: DISCONTINUED | OUTPATIENT
Start: 2024-02-02 | End: 2024-02-02 | Stop reason: HOSPADM

## 2024-02-02 RX ORDER — HEPARIN SODIUM 1000 [USP'U]/ML
INJECTION, SOLUTION INTRAVENOUS; SUBCUTANEOUS AS NEEDED
Status: DISCONTINUED | OUTPATIENT
Start: 2024-02-02 | End: 2024-02-02 | Stop reason: HOSPADM

## 2024-02-02 RX ORDER — SODIUM CHLORIDE 9 MG/ML
1.5 INJECTION, SOLUTION INTRAVENOUS CONTINUOUS
Status: DISCONTINUED | OUTPATIENT
Start: 2024-02-02 | End: 2024-02-02 | Stop reason: HOSPADM

## 2024-02-02 RX ORDER — MIDAZOLAM HYDROCHLORIDE 1 MG/ML
INJECTION, SOLUTION INTRAMUSCULAR; INTRAVENOUS AS NEEDED
Status: DISCONTINUED | OUTPATIENT
Start: 2024-02-02 | End: 2024-02-02 | Stop reason: HOSPADM

## 2024-02-02 RX ORDER — SODIUM CHLORIDE 9 MG/ML
200 INJECTION, SOLUTION INTRAVENOUS CONTINUOUS
Status: DISCONTINUED | OUTPATIENT
Start: 2024-02-02 | End: 2024-02-02 | Stop reason: SDUPTHER

## 2024-02-02 RX ORDER — VERAPAMIL HYDROCHLORIDE 2.5 MG/ML
INJECTION, SOLUTION INTRAVENOUS AS NEEDED
Status: DISCONTINUED | OUTPATIENT
Start: 2024-02-02 | End: 2024-02-02 | Stop reason: HOSPADM

## 2024-02-02 RX ORDER — ACETAMINOPHEN 325 MG/1
650 TABLET ORAL EVERY 6 HOURS PRN
Status: DISCONTINUED | OUTPATIENT
Start: 2024-02-02 | End: 2024-02-02 | Stop reason: HOSPADM

## 2024-02-02 ASSESSMENT — ENCOUNTER SYMPTOMS
ENDOCRINE NEGATIVE: 1
RESPIRATORY NEGATIVE: 1
ALLERGIC/IMMUNOLOGIC NEGATIVE: 1
EYES NEGATIVE: 1
NEUROLOGICAL NEGATIVE: 1
FATIGUE: 1
MUSCULOSKELETAL NEGATIVE: 1
HEMATOLOGIC/LYMPHATIC NEGATIVE: 1
PSYCHIATRIC NEGATIVE: 1
CARDIOVASCULAR NEGATIVE: 1
GASTROINTESTINAL NEGATIVE: 1

## 2024-02-02 ASSESSMENT — PAIN - FUNCTIONAL ASSESSMENT: PAIN_FUNCTIONAL_ASSESSMENT: 0-10

## 2024-02-02 NOTE — H&P
History Of Present Illness  Josee Henson is a 88 y.o. female presenting with aortic stenosis. PMH includes HTN, HLD, CKD     Past Medical History  She has no past medical history on file.    Surgical History  She has a past surgical history that includes Total knee arthroplasty (04/15/2014); Other surgical history (04/15/2014); and Colonoscopy (04/15/2014).     Social History  She reports that she has never smoked. She has never used smokeless tobacco. She reports current alcohol use of about 1.0 standard drink of alcohol per week. She reports that she does not use drugs.    Family History  Family History   Problem Relation Name Age of Onset    Alzheimer's disease Mother  83    Other (ovarian mass) Mother      Diabetes Father  69    Leukemia Father      Heart attack Father          Allergies  Tetracycline    Home Medications  No current facility-administered medications on file prior to encounter.     Current Outpatient Medications on File Prior to Encounter   Medication Sig Dispense Refill    acetaminophen (Tylenol Extra Strength) 500 mg tablet Take 1 tablet (500 mg) by mouth. As directed.      amoxicillin (Amoxil) 500 mg capsule TAKE 4 CAPSULES BY MOUTH 1 HOUR BEFORE PROCEDURE      aspirin (Ecotrin Low Strength) 81 mg EC tablet Take 1 tablet (81 mg) by mouth once daily at bedtime.      atorvastatin (Lipitor) 10 mg tablet Take 1 tablet (10 mg) by mouth once daily. 90 tablet 1    biotin 5 mg capsule Take 1 capsule (5 mg) by mouth once daily.      calcium carbonate-vitamin D3 (Oscal-500) 500 mg-10 mcg (400 unit) tablet Take 2 tablets by mouth once daily at bedtime.      denosumab (Prolia) 60 mg/mL syringe Inject 1 mL (60 mg) under the skin every 6 months.      ergocalciferol (Vitamin D-2) 1.25 MG (91964 UT) capsule Take 1 capsule (1,250 mcg) by mouth 1 (one) time per week.      fexofenadine (Allegra Allergy) 180 mg tablet Take 1 tablet (180 mg) by mouth. As directed.      flaxseed oiL 1,000 mg capsule Take 1  capsule (1,000 mg) by mouth 2 times a day.      fluticasone (Flonase) 50 mcg/actuation nasal spray Administer 2 sprays into each nostril once daily.      ibuprofen (AdviL) 200 mg tablet Take 2 tablets (400 mg) by mouth every 6 hours if needed for mild pain (1 - 3).      levothyroxine (Synthroid, Levoxyl) 125 mcg tablet Take 1 tablet (125 mcg) by mouth once daily.      mv-min-folic-K1-lycopen-lutein (Centrum Silver Ultra Men's) 380--300 mcg tablet Take 1 tablet by mouth once daily.      omeprazole (PriLOSEC) 20 mg DR capsule Take 1 capsule (20 mg) by mouth once daily.      omeprazole magnesium (PRILOSEC ORAL) Take 20 mg by mouth once daily.      quiNINE (Qualaquin) 324 mg capsule Take 1 capsule (324 mg) by mouth. As directed.      valsartan-hydrochlorothiazide (Diovan-HCT) 80-12.5 mg tablet Take 1 tablet by mouth once daily.      zolpidem (Ambien) 5 mg tablet Take 1 tablet (5 mg) by mouth as needed at bedtime for sleep. 30 tablet 3          Inpatient Medications:  Scheduled medications   Medication Dose Route Frequency     PRN medications   Medication     Continuous Medications   Medication Dose Last Rate    sodium chloride 0.9%  200 mL/hr      sodium chloride 0.9%  100 mL/hr           Review of Systems   Constitutional:  Positive for fatigue.   HENT: Negative.     Eyes: Negative.    Respiratory: Negative.     Cardiovascular: Negative.    Gastrointestinal: Negative.    Endocrine: Negative.    Genitourinary: Negative.    Musculoskeletal: Negative.    Skin: Negative.    Allergic/Immunologic: Negative.    Neurological: Negative.    Hematological: Negative.    Psychiatric/Behavioral: Negative.            Physical Exam  Constitutional:       General: She is awake. She is not in acute distress.     Appearance: She is not ill-appearing or toxic-appearing.   HENT:      Nose: Nose normal.      Mouth/Throat:      Mouth: Mucous membranes are moist.      Pharynx: Oropharynx is clear.   Eyes:      Extraocular Movements:  Extraocular movements intact.      Conjunctiva/sclera: Conjunctivae normal.   Cardiovascular:      Rate and Rhythm: Normal rate and regular rhythm.      Pulses: Normal pulses.           Radial pulses are 2+ on the right side and 2+ on the left side.        Dorsalis pedis pulses are 2+ on the right side and 2+ on the left side.      Heart sounds: Murmur heard.      Systolic murmur is present with a grade of 4/6.   Pulmonary:      Effort: Pulmonary effort is normal.      Breath sounds: Normal breath sounds and air entry.   Abdominal:      General: Bowel sounds are normal. There is no distension.      Palpations: Abdomen is soft.      Tenderness: There is no abdominal tenderness.   Musculoskeletal:      Cervical back: Normal range of motion and neck supple.      Right lower leg: No edema.      Left lower leg: No edema.   Skin:     General: Skin is warm and dry.   Neurological:      General: No focal deficit present.      Mental Status: She is alert and oriented to person, place, and time. Mental status is at baseline.      GCS: GCS eye subscore is 4. GCS verbal subscore is 5. GCS motor subscore is 6.   Psychiatric:         Mood and Affect: Mood normal.         Behavior: Behavior normal. Behavior is cooperative.         Thought Content: Thought content normal.         Judgment: Judgment normal.        Sedation Plan    ASA 3     Mallampati class: II.         Last Recorded Vitals  There were no vitals taken for this visit.    Relevant Results    Labs    CBC:   Recent Labs     10/26/23  0942 10/27/22  0955 11/24/21  0423 10/14/21  1050 03/23/21  1430 12/16/20  0932   WBC 5.7 6.1 7.4 6.0 10.9 10.6   HGB 11.5* 11.7* 9.5* 11.9* 12.3 12.4   HCT 38.0 37.2 30.0* 37.7 39.4 38.3    202 258 240 244 240   MCV 92 88 89.6 89 90 89     BMP/CMP:   Recent Labs     11/30/23  1210 10/26/23  0942 06/07/23  1459 12/27/22  1357 10/27/22  0955 06/07/22  1112 04/13/22  1435 11/05/21  1319 10/14/21  1050 06/08/21  1030 03/23/21  1430   NA  "139 142 145  --  140 142 138   < > 139   < > 143   K 5.0 4.5 4.6  --  3.9 4.2 4.2   < > 4.2   < > 4.3    105 106  --  105 103 101   < > 104   < > 103   BUN 37* 38* 34*  --  28* 34* 31*   < > 31*   < > 33*   CREATININE 1.63* 1.72* 1.44*  --  1.28* 1.62* 1.64*   < > 1.53*   < > 1.41*   CO2 27 27 31  --  27 29 27   < > 27   < > 26   CALCIUM 10.9* 10.6 10.8* 11.0* 10.3 10.7* 10.7*   < > 10.4*   < > 9.9   PROT  --  6.0* 6.0*  --  6.3*  --  6.7  --  6.3*  --  6.5   BILITOT  --  0.5 0.3  --  0.5  --  0.3  --  0.5  --  0.5   ALKPHOS  --  73 77  --  79  --  93  --  89  --  83   ALT  --  13 14  --  11  --  16  --  15  --  17   AST  --  19 21  --  18  --  26  --  21  --  20   GLUCOSE 83 109* 98  --  97 118* 110*   < > 118*   < > 80    < > = values in this interval not displayed.      Lipid Panel:   Recent Labs     10/26/23  0942 10/27/22  0955 10/14/21  1050 10/22/20  0936 10/21/19  0924 10/19/18  1013   CHOL 199 173 202* 197 191 194   HDL 75.8 69.5 80.6 88.0 89.1 92.8   CHHDL 2.6 2.5 2.5 2.2 2.1 2.1   VLDL 27 23 24 24 21 16   TRIG 134 115 120 118 107 78   NHDL 123  --   --   --   --   --      Cardiac       No lab exists for component: \"CK\", \"CKMBP\"   Hemoglobin A1C:   Recent Labs     10/26/23  0942 06/16/20  1253 06/17/19  1406 06/14/18  1138 12/14/17  1109   HGBA1C 6.0* 6.4 6.2 6.1 5.9     TSH/ Free T4:   Recent Labs     10/26/23  0942 06/07/23  1459 10/27/22  0955 06/07/22  1112 10/14/21  1050 06/08/21  1030 10/22/20  0936 08/18/20  1302 06/16/20  1253 12/31/19  1240 12/02/19  1615 10/21/19  0924 10/19/18  1013 06/14/18  1138   TSH 5.63* 2.63 3.51 2.68 7.12* 1.30 4.07* 2.70 0.05* 3.07 6.45* 9.20*   < > 0.26*   FREET4 0.98  --   --   --   --  1.62*  --   --  1.85*  --   --   --   --  1.50*    < > = values in this interval not displayed.     Iron: No results for input(s): \"FERRITIN\", \"TIBC\", \"IRONSAT\", \"BNP\" in the last 27228 hours.  Coag:     ABO: No results found for: \"ABO\"    Past Cardiology Tests (Last 3 " Years):  EKG:  Encounter Date: 01/04/24   ECG 12 lead (Clinic Performed)   Result Value    Ventricular Rate 68    Atrial Rate 68    WI Interval 142    QRS Duration 90    QT Interval 404    QTC Calculation(Bazett) 429    P Axis 40    R Axis -1    T Axis 29    QRS Count 11    Q Onset 213    P Onset 142    P Offset 193    T Offset 415    QTC Fredericia 421    Narrative    Normal sinus rhythm  Nonspecific ST and T wave abnormality  Abnormal ECG  When compared with ECG of 16-DEC-2020 09:12,  Premature atrial complexes are no longer Present     Echo:  Results for orders placed during the hospital encounter of 11/30/23    Transthoracic Echo (TTE) Complete    Narrative  [facility] [place], [address] [place] [address]  Tel [phone] and Fax [phone]    TRANSTHORACIC ECHOCARDIOGRAM REPORT      Patient Name:     JAIRO Chavira Physician:  [id number] Lazaro Stovall DO  Study Date:       11/30/2023          Ordering Provider:  [id number] ALAYNA CARTY  MRN/PID:          [id number]            Fellow:  Accession#:       [id number]        Nurse:  Date of           1935 / [birth year]      Sonographer:        Carlos Gutierrez RDMS  Birth/Age:        years  Gender:           F                   Additional Staff:   Lena Casanova RD  Height:           154.00 cm           Admit Date:  Weight:           74.00 kg            Admission Status:   Outpatient  BSA:              1.72 m2             Encounter#:         [id number]  Formerly [facility]  Location:           Invasive  Blood Pressure: 122 /78 mmHg    Study Type:    TRANSTHORACIC ECHO (TTE) COMPLETE  Diagnosis/ICD: Nonrheumatic aortic (valve) stenosis-I35.0  Indication:    AS  CPT Code:      Echo Complete w Full Doppler-[id number]    Patient History:  Pertinent History: HTN.    Study Detail: The following Echo studies were performed: 2D, M-Mode, Doppler and  color flow.      PHYSICIAN INTERPRETATION:  Left Ventricle: The left ventricular systolic function  is normal, with an estimated ejection fraction of 60%. There are no regional wall motion abnormalities. The left ventricular cavity size is normal. Spectral Doppler shows a pseudonormal pattern of left ventricular diastolic filling.  Left Atrium: The left atrium is upper limits of normal in size.  Right Ventricle: The right ventricle is normal in size. There is normal right ventricular global systolic function.  Right Atrium: The right atrium is normal in size.  Aortic Valve: The aortic valve is probably trileaflet. There is moderate to severe aortic valve cusp calcification. There is evidence of moderate to severe aortic valve stenosis.  There is low flow across the aortic valve, with a normal ejection fraction. The aortic valve dimensionless index is 0.23. There is no evidence of aortic valve regurgitation. The peak instantaneous gradient of the aortic valve is 50.1 mmHg. The mean gradient of the aortic valve is 29.0 mmHg.  Mitral Valve: The mitral valve is mildly thickened. There is mild mitral valve regurgitation.  Tricuspid Valve: The tricuspid valve is structurally normal. No evidence of tricuspid regurgitation.  Pulmonic Valve: The pulmonic valve is structurally normal. There is no indication of pulmonic valve regurgitation.  Pericardium: There is no pericardial effusion noted.  Aorta: The aortic root is normal.  In comparison to the previous echocardiogram(s): Compared with study from 11/8/2022, dimensionless index 0.23 down from 0.34 suggesting more severe aortic stenosis. Clinical correlation suggested.      CONCLUSIONS:  1. Left ventricular systolic function is normal with a 60% estimated ejection fraction.  2. Spectral Doppler shows a pseudonormal pattern of left ventricular diastolic filling.  3. Moderate to severe aortic valve stenosis. pk/mn 50/29 mmHg, DI 0.23, ADITYA 0.6 suggests low flow low gradient severe type.  4. There is moderate to severe aortic valve cusp calcification.  5. Compared with study  from 2022, dimensionless index 0.23 down from 0.34 suggesting more severe aortic stenosis. Clinical correlation suggested.    QUANTITATIVE DATA SUMMARY:  2D MEASUREMENTS:  Normal Ranges:  IVSd:          1.50 cm    (0.6-1.1cm)  LVPWd:         1.40 cm    (0.6-1.1cm)  LVIDd:         4.00 cm    (3.9-5.9cm)  LVIDs:         2.70 cm  LV Mass Index: 127.9 g/m2  LV % FS        32.5 %    LA VOLUME:  Normal Ranges:  LA Vol A4C:        65.0 ml    (22+/-6mL/m2)  LA Vol A2C:        55.9 ml  LA Vol BP:         63.0 ml  LA Vol Index A4C:  37.7ml/m2  LA Vol Index A2C:  32.4 ml/m2  LA Vol Index BP:   36.5 ml/m2  LA Area A4C:       19.5 cm2  LA Area A2C:       18.9 cm2  LA Major Axis A4C: 5.0 cm  LA Major Axis A2C: 5.4 cm  LA Volume Index:   36.5 ml/m2    RA VOLUME BY A/L METHOD:  Normal Ranges:  RA Vol A4C:        35.2 ml    (8.3-19.5ml)  RA Vol Index A4C:  20.4 ml/m2  RA Area A4C:       15.3 cm2  RA Major Axis A4C: 5.7 cm    M-MODE MEASUREMENTS:  Normal Ranges:  Ao Root: 2.50 cm (2.0-3.7cm)  LAs:     5.20 cm (2.7-4.0cm)    AORTA MEASUREMENTS:  Normal Ranges:  Ao Sinus, d: 2.57 cm (2.1-3.5cm)  Ao STJ, d:   1.83 cm (1.7-3.4cm)  Asc Ao, d:   3.20 cm (2.1-3.4cm)    LV SYSTOLIC FUNCTION BY 2D PLANIMETRY (MOD):  Normal Ranges:  EF-A4C View: 66.7 % (>=55%)  EF-A2C View: 69.9 %  EF-Biplane:  69.3 %    LV DIASTOLIC FUNCTION:  Normal Ranges:  MV Peak E:        0.66 m/s    (0.7-1.2 m/s)  MV Peak A:        0.71 m/s    (0.42-0.7 m/s)  E/A Ratio:        0.92        (1.0-2.2)  MV lateral e'     0.13 m/s  MV medial e'      0.06 m/s  E/e' Ratio:       5.00        (<8.0)  PulmV Sys Biju:    60.20 cm/s  PulmV Galan Biju:   65.20 cm/s  PulmV S/D Biju:    0.90  PulmV A Revs Biju: 20.70 cm/s  PulmV A Revs Dur: 109.00 msec    MITRAL VALVE:  Normal Ranges:  MV DT: 387 msec (150-240msec)    AORTIC VALVE:  Normal Ranges:  AoV Vmax:                3.54 m/s  (<=1.7m/s)  AoV Peak P.1 mmHg (<20mmHg)  AoV Mean P.0 mmHg  (1.7-11.5mmHg)  LVOT Max Biju:            0.94 m/s  (<=1.1m/s)  AoV VTI:                 90.80 cm  (18-25cm)  LVOT VTI:                21.20 cm  LVOT Diameter:           1.74 cm   (1.8-2.4cm)  AoV Area, VTI:           0.56 cm2  (2.5-5.5cm2)  AoV Area,Vmax:           0.63 cm2  (2.5-4.5cm2)  AoV Dimensionless Index: 0.23      RIGHT VENTRICLE:  RV Basal 3.14 cm  RV Mid   2.37 cm  RV Major 4.8 cm  TAPSE:   23.3 mm  RV s'    0.13 m/s    TRICUSPID VALVE/RVSP:  Normal Ranges:  Peak TR Velocity: 2.88 m/s  Est. RA Pressure: 3 mmHg  RV Syst Pressure: 36.2 mmHg (< 30mmHg)  IVC Diam:         1.06 cm    PULMONIC VALVE:  Normal Ranges:  PV Max Biju: 1.8 m/s   (0.6-0.9m/s)  PV Max P.7 mmHg    Pulmonary Veins:  PulmV A Revs Dur: 109.00 msec  PulmV A Revs Biju: 20.70 cm/s  PulmV Galan Biju:   65.20 cm/s  PulmV S/D Biju:    0.90  PulmV Sys Biju:    60.20 cm/s      [id number] Lazaro Stovall DO  Electronically signed on 2023 at 5:12:13 PM        ** Final **    Ejection Fractions:  LV biplane EF   Date/Time Value Ref Range Status   2023 11:10 AM 69       Cath:  No results found for this or any previous visit.    Stress Test:  No results found for this or any previous visit.    Cardiac Imaging:  No results found for this or any previous visit.        === 24 ===    CT TAVR LOW CONTRAST CHEST ABDOMEN PELVIS    - Impression -  1. Scattered mild atherosclerotic changes involving the  thoracoabdominal aorta and its branches. The access vessels are  patent.  2. Severe calcifications of the aortic valve correlating with history  of aortic stenosis.  3. Moderate coronary artery calcification. Correlate with coronary  artery disease risk factors.  4. Small hiatal hernia.  5. Scattered colonic diverticula with no evidence of acute  diverticulitis.  6. Other findings as described above.      MACRO:  None    Signed by: Mark Shabazz 2024 11:57 AM  Dictation workstation:   [id number]     Assessment/Plan  Assessment/Plan    Principal Problem:    Nonrheumatic aortic (valve) stenosis  -[facility] with Dr. Traylor on 2/2/24       I spent 30 minutes in the professional and overall care of this patient.      ELEAZAR Hernandez-CNP, DNP

## 2024-02-02 NOTE — POST-PROCEDURE NOTE
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    Any Specimen(s) Removed:   Order Name Source Comment Collection Info Order Time   CBC Blood, Venous  Collected By: Miroslava Larson RN 2/2/2024  9:15 AM     Release result to MyChart   Immediate        BASIC METABOLIC PANEL Blood, Venous  Collected By: Miroslava Larson RN 2/2/2024  9:15 AM     Release result to MyChart   Immediate            Disposition:   home      Electronically signed by: Pérez Traylor MD, 2/2/2024 1:59 PM

## 2024-02-02 NOTE — Clinical Note
Patient Clipped and Prepped: right groin and right brachial. Prepped with ChloraPrep, a minimum of 3 minute dry time, longer if needed, no pooling noted, patient draped in sterile fashion.

## 2024-02-02 NOTE — PRE-SEDATION DOCUMENTATION
Sedation Plan    ASA 3     Mallampati class: II.    Risks, benefits, and alternatives discussed with patient (and her adult daughter).      C. Barton Gillombardo, MD  Interventional Cardiology Fellow, PGY8

## 2024-02-05 ENCOUNTER — CARDIOLOGY CONFERENCE (OUTPATIENT)
Dept: CARDIOLOGY | Facility: HOSPITAL | Age: 89
End: 2024-02-05
Payer: MEDICARE

## 2024-02-05 DIAGNOSIS — I35.0 NONRHEUMATIC AORTIC VALVE STENOSIS: Primary | ICD-10-CM

## 2024-02-05 NOTE — PROGRESS NOTES
Cath - non obstructive   Access good   A 383  p72.6  SOV 29/27/27  STJ 25   Evolut fx 26, no predil

## 2024-02-18 LAB
ATRIAL RATE: 68 BPM
P AXIS: 40 DEGREES
P OFFSET: 193 MS
P ONSET: 142 MS
PR INTERVAL: 142 MS
Q ONSET: 213 MS
QRS COUNT: 11 BEATS
QRS DURATION: 90 MS
QT INTERVAL: 404 MS
QTC CALCULATION(BAZETT): 429 MS
QTC FREDERICIA: 421 MS
R AXIS: -1 DEGREES
T AXIS: 29 DEGREES
T OFFSET: 415 MS
VENTRICULAR RATE: 68 BPM

## 2024-02-22 ENCOUNTER — TELEPHONE (OUTPATIENT)
Dept: CARDIOLOGY | Facility: HOSPITAL | Age: 89
End: 2024-02-22
Payer: MEDICARE

## 2024-02-22 PROBLEM — Z95.2 S/P TAVR (TRANSCATHETER AORTIC VALVE REPLACEMENT): Status: ACTIVE | Noted: 2024-02-22

## 2024-02-22 PROBLEM — I35.0 NONRHEUMATIC AORTIC VALVE STENOSIS: Status: ACTIVE | Noted: 2024-02-22

## 2024-02-22 RX ORDER — ACETAMINOPHEN 325 MG/1
650 TABLET ORAL EVERY 6 HOURS PRN
Status: CANCELLED | OUTPATIENT
Start: 2024-02-22

## 2024-02-22 RX ORDER — ASPIRIN 81 MG/1
81 TABLET ORAL DAILY
Status: CANCELLED | OUTPATIENT
Start: 2024-02-23

## 2024-02-22 RX ORDER — TRAMADOL HYDROCHLORIDE 50 MG/1
50 TABLET ORAL EVERY 6 HOURS PRN
Status: CANCELLED | OUTPATIENT
Start: 2024-02-22

## 2024-02-22 RX ORDER — PANTOPRAZOLE SODIUM 40 MG/1
40 TABLET, DELAYED RELEASE ORAL
Status: CANCELLED | OUTPATIENT
Start: 2024-02-23

## 2024-02-22 RX ORDER — ONDANSETRON 4 MG/1
4 TABLET, ORALLY DISINTEGRATING ORAL EVERY 8 HOURS PRN
Status: CANCELLED | OUTPATIENT
Start: 2024-02-22

## 2024-02-22 RX ORDER — ONDANSETRON HYDROCHLORIDE 2 MG/ML
4 INJECTION, SOLUTION INTRAVENOUS EVERY 8 HOURS PRN
Status: CANCELLED | OUTPATIENT
Start: 2024-02-22

## 2024-02-22 RX ORDER — ACETAMINOPHEN 160 MG/5ML
650 SOLUTION ORAL EVERY 6 HOURS PRN
Status: CANCELLED | OUTPATIENT
Start: 2024-02-22

## 2024-02-22 RX ORDER — ACETAMINOPHEN 650 MG/1
650 SUPPOSITORY RECTAL EVERY 6 HOURS PRN
Status: CANCELLED | OUTPATIENT
Start: 2024-02-22

## 2024-02-22 RX ORDER — PANTOPRAZOLE SODIUM 40 MG/10ML
40 INJECTION, POWDER, LYOPHILIZED, FOR SOLUTION INTRAVENOUS
Status: CANCELLED | OUTPATIENT
Start: 2024-02-23

## 2024-02-22 RX ORDER — HEPARIN SODIUM 5000 [USP'U]/ML
5000 INJECTION, SOLUTION INTRAVENOUS; SUBCUTANEOUS EVERY 8 HOURS
Status: CANCELLED | OUTPATIENT
Start: 2024-02-24

## 2024-02-22 RX ORDER — DOCUSATE SODIUM 100 MG/1
100 CAPSULE, LIQUID FILLED ORAL 2 TIMES DAILY
Status: CANCELLED | OUTPATIENT
Start: 2024-02-22

## 2024-02-22 NOTE — DISCHARGE INSTRUCTIONS
Josee Henson is a 89 y.o. female with a PMH of hypertension, hyperlipidemia, chronic kidney disease 3b, hypothryroidism, GERD, leg cramps (on Quinine), glucose intolerance, obesity (BMI 30.2), and severe non-rheumatic aortic stenosis.  Pt presented on 2/23/24 for elective TAVR.    Hospital Course  Josee Henson is now s/p TAVR Evolut FX 26mm via B/L femoral artery (Right primary) on 2/23/24 with Dr. Correa and Dr. Khanna.  Final procedure ECHO showed a mean gradient of 3, trace PVL, and no PC effusion.  Temp wire maintained via RIJ d/t the development of pacer-dependent CHB.  On admission to CICU, patient is complaining of neck pain at site of line insertion but otherwise no chest pain, difficulty breathing, or confusion. She was admitted with transvenous pacer set at 60bpm. On POD 0, patient briefly was able to sustain HR without pacing (with pacemaker set at 40 bpm). After 1-2 hours, patient developed heart block and HR dropped to the 40s with hypotension and decreased responsiveness. Pacer was increased to 60 bpm, she was given 1L IVF and BP and mentation improved. EKG showed a new RBBB.  EP was consulted.    12-lead ECG 2/24/2024: NSR 65 bpm,  ms, ICRBBB ( ms) and persistent LAFB (bifascicular AVB), no significant ST-T changes  12-lead ECG 2/23/2024 after DORIE: NSR 65 bpm,  ms, NO LVH but NEW CRBBB ( ms) and more profound LAFB (bifascicular AVB), no significant ST-T changes  12-lead ECG 1/2024: NSR 68 bpm,  ms, left axis deviation (LAFB? But narrow QRS 84 ms), NO LVH or BBB, no significant ST-T changes    POD 1 - Pt had episodes of pacing overnight.  POD 1 ECHO showed EF 70-75%, no AI, grad 16.5/8.0, no MR, triv PC effusion.  POD 2 - continued to have intermittent RV pacing    POD 3 - 2/26/24 - Dr. Bishop placed Dual Chamber PPM via left chest wall.  Post procedure CXR without pneumothorax.  H/H and SCR stable.    POD 4 - 2/27/24 - 2V CXR without pneumothorax and shows  proper lead positioning.  Device check shows properly functioning device.  Ok to discharge from EP standpoint.  Pt is mostly euvolemic on assessment with no significant BLE edema, lungs clear but with diminished bases, sats stable in mid 90s.  Labs show stable H/H (9.5/31.7), acute thrombocytopenia (stable, likely d/t procedure, not clinically significant, will repeat CBC in 4-7 days), and slightly increased SCR to 1.9 (ranging 1.4-1.7 for the past couple of months).  Holding Valsartan for now to allow BP to increase, encouraging adequate PO intake, and order for repeat BMP in 4-7 days.  Pt states she is feeling well and ready to go home today.    Plan:  - D/C home today 2/27/2024  - Cont ASA 81mg (for life)  - Continue increased dose of Lipitor 40mg daily  - ** HOLD Valsartan FOR NOW d/t softer BP's (assess for restart at reduced dose as an outpatient) **          - Encourage adequate fluid and food intake while at home  - Keflex PO 500mg BID x7 days (s/p PPM)  - Resume home medications otherwise  - follow up with Structural Heart clinic in one week, 1 month, and 1 year  - Follow-up with EP/device clinic as scheduled  - f/w Dr. Traylor (Primary Cards) as scheduled or in 6-10 weeks  - f/w Bashir Smith MD in 1-2 weeks  - pt given Lab recs (1 week) and ECHO rec (1 month)     Milton Marquez MSN, AGACNP-BC  Acute Care Nurse Practitioner  Structural Heart / TAVR Team  [phone] (ph)  [phone] (fax)        ####  POST VALVE PROCEDURE DISCHARGE INSTRUCTIONS   ####    - Please weigh yourself daily (first thing in the morning) and record reading  - Please take and record your blood pressure 2 times a day (at least 60-90 mins AFTER you take your meds)  PLEASE HAVE THESE READINGS AVAILABLE DURING YOUR FOLLOW-UP APPOINTMENTS!!    - NO DRIVING OR LIFTING/PULLING/PUSHING GREATER THAN 10 POUNDS FOR 1 WEEK FROM YOUR PROCEDURE DATE.    - A lab requisition has been provided for you to draw a CBC, BMP, and PT/INR.  Please  take this rec to your local lab to have your labs drawn between 4-7 days post discharge.     - You have been given the order requisition for the 1 month ECHO at the time of your discharge.  Please call and schedule this ECHO at your LOCAL center (no sooner than 23 days after your procedure date).    - You will have 3 appointments that are vital to your post procedure care, these will be scheduled for you IF YOU NEED TO CHANGE YOUR APPOINTMENT TIME/DATE, PLEASE CALL [phone]   - Please follow up with your PCP within 7-14 days of discharge  - You will follow up with your primary cardiologist in 6-10 weeks    **** No elective dental procedures or cleanings for 3 months post procedure - You will need dental prophylaxis (oral antibiotic) prior to dental work/cleanings for life *****    Please call the STRUCTURAL HEART TEAM LINE if you have any questions or concerns - [phone]     ****   CALL PROVIDER IF:   ****  - Breathing faster than normal.   - Breathing harder than normal or having retractions.   - Fever of 100.4 F (38 C) or higher.   - Chills.   - Drinking less than normal.   - Urinating less than normal, over 1 day.   - Acting very sleepy and difficult to awaken.   - Vomiting (throwing up) and not able to eat or drink for 12 hours.   - 3 or more loose, watery bowel movements in 24 hours (diarrhea).    -Any new concerning symptoms.    - If you develop difficulty breathing, rash, hives, severe nausea, vomiting, light-headedness or any signs of infection, immediately contact your doctor and go to the nearest emergency room.      #####   MISC. HOME-GOING INFO   #####  - DO NOT drink any alcoholic drinks or take any non-prescriptive medications that contain alcohol for the first 24 hours.   - DO NOT make any important decisions for the first 24 hours.      ACTIVITY:  - You are advised to go directly home from the hospital.   - DO NOT lift anything heavier than 10 pounds for one week, this allows for proper  healing of the groin.   - No excessive exercise or treadmill use for one week. You may walk and do stairs, slowly.   - No sexual activities for 24 hours after you arrive home.      WOUND CARE:  - If slight bleeding should occur at site, lie down and have someone apply firm pressure just above the puncture site for 5 minutes.  If it continues or is profuse, call 911. Always notify your doctor if bleeding occurs.   - Keep site clean and dry. Let air dry or you may use a simple bandaid.   - Gently cleanse the puncture site in your groin with soap and water only.   - You may experience some tenderness, bruising or minimal inflammation.  If you have any concerns, you may contact the Cath Lab or if any of these symptoms become excessive, contact your cardiologist or go to the emergency room.   - No tub baths, soaking, or swimming for one week.   - May shower the next day after your procedure.      DIET:  - You may resume your normal diet. However, be mindful of your sodium intake.  Ideally you should try to limit your daily intake of sodium to 2-3g a day      HEART FAILURE SPECIFIC INSTRUCTIONS:   - CALL 911 IF YOU HAVE ANY OF THE SIGNS AND SYMPTOMS OF HEART FAILURE:   1. Chest pain   2. Significant Shortness of breath   3. Fainting.   - Notify your physician immediately if you have shortness of breath; weight gain of 3 lbs. or more; fatigue and loss of energy; swelling of lower extremities or abdomen; dizziness or fainting; change of appetite; and frequent coughing.   - Daily weight on the same scale, same time after voiding and before eating.   - Maintain daily weight log.     Discharge Instructions for your Cardiac Device   CARDIAC DEVICE CLINIC  [phone]              Incision:   1.  Keep your incision clean and dry for 1 week.  2. May shower 7 days after the procedure. Do not submerge the incision in a tub, pool, hot tub, or lake for 4 weeks.  3. Your incision should look better each day. If you notice unusual  swelling, redness, drainage or fever greater than 100 degrees, please call the Device Clinic or your Doctor's office.  4. Avoid using deodorants, powders, creams, lotions, etc on your incision for 4 weeks.   5. There are no stitches to be removed.  If you received a “glue” closure this may appear purple-gray and does not get removed but wears away slowly on its own.  Steri-strips (small white bandages) may be removed in one week or they may fall off on their own earlier.  Pain:  1. It is normal for the area around the incision to be tender for a few weeks following surgery. Pain relievers such as Tylenol or Motrin (whichever you can take) are usually sufficient for pain relief. If the pain gets worse instead of better than please call the Device Clinic or your Doctor.  Activity:  1. If you have a new device and new leads placed than avoid raising your arm above shoulder level for 4 weeks. Do no  anything weighing more than 15 pounds.   2. Avoid exercising with the arm on the side of your pacemaker.  So no golf, swimming, tennis, bowling etc for 4 weeks.      3. Driving: If you were driving prior to your procedure, you may resume driving in 1 week. If you experienced a loss of consciousness prior to your procedure, you should verify with your Doctor when you are able to resume driving.  ID CARD:  1. It is important to carry your device ID card with you at all times.   2. Inform doctors and health care providers that you have a pacemaker or defibrillator.  Electromagnetic Interference:  1. Microwave ovens are safe to use.  2. Cellular phones should be held to the opposite ear from your device. Do not carry your phone in your shirt pocket. Some i-phones that self-charge can interfere with your device so be sure to keep it away from your pacemaker/defibrillator.   3. Read the Patient Booklet for more information. You may call either the Device Clinic [phone]  or the patient services of the device   with questions about specific electrical appliances and interference problems.    IT IS YOUR RESPONSIBILITY TO MAKE AND KEEP APPOINTMENTS.   Please refer to your Device clinic handout.

## 2024-02-22 NOTE — TELEPHONE ENCOUNTER
Returned call to patient. Daughter already confirmed arrival time with lab.  5 meter walk test completed on 2/5/24  11.14 seconds

## 2024-02-23 ENCOUNTER — CLINICAL SUPPORT (OUTPATIENT)
Dept: CARDIOLOGY | Facility: CLINIC | Age: 89
End: 2024-02-23
Payer: MEDICARE

## 2024-02-23 ENCOUNTER — APPOINTMENT (OUTPATIENT)
Dept: CARDIOLOGY | Facility: HOSPITAL | Age: 89
DRG: 267 | End: 2024-02-23
Payer: MEDICARE

## 2024-02-23 ENCOUNTER — HOSPITAL ENCOUNTER (INPATIENT)
Facility: HOSPITAL | Age: 89
LOS: 4 days | Discharge: HOME | DRG: 267 | End: 2024-02-27
Attending: INTERNAL MEDICINE | Admitting: INTERNAL MEDICINE
Payer: MEDICARE

## 2024-02-23 DIAGNOSIS — I10 PRIMARY HYPERTENSION: ICD-10-CM

## 2024-02-23 DIAGNOSIS — I97.89 COMPLETE HEART BLOCK, POST-SURGICAL (MULTI): ICD-10-CM

## 2024-02-23 DIAGNOSIS — Z95.2 S/P TAVR (TRANSCATHETER AORTIC VALVE REPLACEMENT): ICD-10-CM

## 2024-02-23 DIAGNOSIS — Z95.0 S/P PLACEMENT OF CARDIAC PACEMAKER: ICD-10-CM

## 2024-02-23 DIAGNOSIS — I35.0 NONRHEUMATIC AORTIC (VALVE) STENOSIS: ICD-10-CM

## 2024-02-23 DIAGNOSIS — I44.2 COMPLETE HEART BLOCK, POST-SURGICAL (MULTI): ICD-10-CM

## 2024-02-23 DIAGNOSIS — I35.0 NONRHEUMATIC AORTIC VALVE STENOSIS: Primary | ICD-10-CM

## 2024-02-23 LAB
ANION GAP SERPL CALC-SCNC: 13 MMOL/L (ref 10–20)
AORTIC VALVE MEAN GRADIENT: 22 MMHG
AORTIC VALVE PEAK VELOCITY: 3.14 M/S
AV PEAK GRADIENT: 39.4 MMHG
AVA (PEAK VEL): 0.92 CM2
AVA (VTI): 0.83 CM2
BASOPHILS # BLD AUTO: 0.04 X10*3/UL (ref 0–0.1)
BASOPHILS NFR BLD AUTO: 0.7 %
BUN SERPL-MCNC: 34 MG/DL (ref 6–23)
CALCIUM SERPL-MCNC: 9.8 MG/DL (ref 8.6–10.6)
CHLORIDE SERPL-SCNC: 105 MMOL/L (ref 98–107)
CO2 SERPL-SCNC: 26 MMOL/L (ref 21–32)
CREAT SERPL-MCNC: 1.47 MG/DL (ref 0.5–1.05)
EGFRCR SERPLBLD CKD-EPI 2021: 34 ML/MIN/1.73M*2
EJECTION FRACTION APICAL 4 CHAMBER: 78.6
EJECTION FRACTION: 75 %
EOSINOPHIL # BLD AUTO: 0.13 X10*3/UL (ref 0–0.4)
EOSINOPHIL NFR BLD AUTO: 2.2 %
ERYTHROCYTE [DISTWIDTH] IN BLOOD BY AUTOMATED COUNT: 14.6 % (ref 11.5–14.5)
GLUCOSE SERPL-MCNC: 80 MG/DL (ref 74–99)
HCT VFR BLD AUTO: 32.8 % (ref 36–46)
HGB BLD-MCNC: 10.2 G/DL (ref 12–16)
IMM GRANULOCYTES # BLD AUTO: 0.03 X10*3/UL (ref 0–0.5)
IMM GRANULOCYTES NFR BLD AUTO: 0.5 % (ref 0–0.9)
INR PPP: 1.3 (ref 0.9–1.1)
LEFT ATRIUM VOLUME AREA LENGTH INDEX BSA: 29.8 ML/M2
LEFT VENTRICLE INTERNAL DIMENSION DIASTOLE: 4.9 CM (ref 3.5–6)
LEFT VENTRICULAR OUTFLOW TRACT DIAMETER: 1.8 CM
LYMPHOCYTES # BLD AUTO: 1.4 X10*3/UL (ref 0.8–3)
LYMPHOCYTES NFR BLD AUTO: 23.9 %
MAGNESIUM SERPL-MCNC: 1.77 MG/DL (ref 1.6–2.4)
MCH RBC QN AUTO: 28.3 PG (ref 26–34)
MCHC RBC AUTO-ENTMCNC: 31.1 G/DL (ref 32–36)
MCV RBC AUTO: 91 FL (ref 80–100)
MITRAL VALVE E/A RATIO: 1.09
MITRAL VALVE E/E' RATIO: 6.45
MONOCYTES # BLD AUTO: 0.41 X10*3/UL (ref 0.05–0.8)
MONOCYTES NFR BLD AUTO: 7 %
NEUTROPHILS # BLD AUTO: 3.85 X10*3/UL (ref 1.6–5.5)
NEUTROPHILS NFR BLD AUTO: 65.7 %
NRBC BLD-RTO: 0 /100 WBCS (ref 0–0)
PLATELET # BLD AUTO: 180 X10*3/UL (ref 150–450)
POTASSIUM SERPL-SCNC: 4.1 MMOL/L (ref 3.5–5.3)
PROTHROMBIN TIME: 15.1 SECONDS (ref 9.8–12.8)
RBC # BLD AUTO: 3.61 X10*6/UL (ref 4–5.2)
RIGHT VENTRICLE FREE WALL PEAK S': 10.7 CM/S
RIGHT VENTRICLE PEAK SYSTOLIC PRESSURE: 35.5 MMHG
SODIUM SERPL-SCNC: 140 MMOL/L (ref 136–145)
TRICUSPID ANNULAR PLANE SYSTOLIC EXCURSION: 1.9 CM
WBC # BLD AUTO: 5.9 X10*3/UL (ref 4.4–11.3)

## 2024-02-23 PROCEDURE — 99153 MOD SED SAME PHYS/QHP EA: CPT | Performed by: INTERNAL MEDICINE

## 2024-02-23 PROCEDURE — 02RF38Z REPLACEMENT OF AORTIC VALVE WITH ZOOPLASTIC TISSUE, PERCUTANEOUS APPROACH: ICD-10-PCS | Performed by: INTERNAL MEDICINE

## 2024-02-23 PROCEDURE — 2500000001 HC RX 250 WO HCPCS SELF ADMINISTERED DRUGS (ALT 637 FOR MEDICARE OP): Performed by: INTERNAL MEDICINE

## 2024-02-23 PROCEDURE — 93321 DOPPLER ECHO F-UP/LMTD STD: CPT | Performed by: INTERNAL MEDICINE

## 2024-02-23 PROCEDURE — 2500000001 HC RX 250 WO HCPCS SELF ADMINISTERED DRUGS (ALT 637 FOR MEDICARE OP)

## 2024-02-23 PROCEDURE — 93325 DOPPLER ECHO COLOR FLOW MAPG: CPT | Performed by: INTERNAL MEDICINE

## 2024-02-23 PROCEDURE — G0269 OCCLUSIVE DEVICE IN VEIN ART: HCPCS | Mod: TC,59,MUE | Performed by: INTERNAL MEDICINE

## 2024-02-23 PROCEDURE — C1756 CATH, PACING, TRANSESOPH: HCPCS | Performed by: INTERNAL MEDICINE

## 2024-02-23 PROCEDURE — 2500000002 HC RX 250 W HCPCS SELF ADMINISTERED DRUGS (ALT 637 FOR MEDICARE OP, ALT 636 FOR OP/ED): Mod: MUE

## 2024-02-23 PROCEDURE — 83735 ASSAY OF MAGNESIUM: CPT

## 2024-02-23 PROCEDURE — 93321 DOPPLER ECHO F-UP/LMTD STD: CPT

## 2024-02-23 PROCEDURE — C1769 GUIDE WIRE: HCPCS | Performed by: INTERNAL MEDICINE

## 2024-02-23 PROCEDURE — 2720000007 HC OR 272 NO HCPCS: Performed by: INTERNAL MEDICINE

## 2024-02-23 PROCEDURE — 2500000004 HC RX 250 GENERAL PHARMACY W/ HCPCS (ALT 636 FOR OP/ED): Performed by: INTERNAL MEDICINE

## 2024-02-23 PROCEDURE — C1889 IMPLANT/INSERT DEVICE, NOC: HCPCS | Performed by: INTERNAL MEDICINE

## 2024-02-23 PROCEDURE — 2500000005 HC RX 250 GENERAL PHARMACY W/O HCPCS: Performed by: INTERNAL MEDICINE

## 2024-02-23 PROCEDURE — 85025 COMPLETE CBC W/AUTO DIFF WBC: CPT

## 2024-02-23 PROCEDURE — 2500000004 HC RX 250 GENERAL PHARMACY W/ HCPCS (ALT 636 FOR OP/ED)

## 2024-02-23 PROCEDURE — 5A1223Z PERFORMANCE OF CARDIAC PACING, CONTINUOUS: ICD-10-PCS | Performed by: INTERNAL MEDICINE

## 2024-02-23 PROCEDURE — 33361 REPLACE AORTIC VALVE PERQ: CPT | Performed by: INTERNAL MEDICINE

## 2024-02-23 PROCEDURE — 82374 ASSAY BLOOD CARBON DIOXIDE: CPT

## 2024-02-23 PROCEDURE — 2780000003 HC OR 278 NO HCPCS: Performed by: INTERNAL MEDICINE

## 2024-02-23 PROCEDURE — 93308 TTE F-UP OR LMTD: CPT | Performed by: INTERNAL MEDICINE

## 2024-02-23 PROCEDURE — 93005 ELECTROCARDIOGRAM TRACING: CPT

## 2024-02-23 PROCEDURE — 85347 COAGULATION TIME ACTIVATED: CPT | Performed by: INTERNAL MEDICINE

## 2024-02-23 PROCEDURE — 85347 COAGULATION TIME ACTIVATED: CPT

## 2024-02-23 PROCEDURE — 33361 REPLACE AORTIC VALVE PERQ: CPT | Performed by: THORACIC SURGERY (CARDIOTHORACIC VASCULAR SURGERY)

## 2024-02-23 PROCEDURE — C1894 INTRO/SHEATH, NON-LASER: HCPCS | Performed by: INTERNAL MEDICINE

## 2024-02-23 PROCEDURE — 2550000001 HC RX 255 CONTRASTS: Performed by: INTERNAL MEDICINE

## 2024-02-23 PROCEDURE — 99152 MOD SED SAME PHYS/QHP 5/>YRS: CPT | Performed by: INTERNAL MEDICINE

## 2024-02-23 PROCEDURE — C1760 CLOSURE DEV, VASC: HCPCS | Performed by: INTERNAL MEDICINE

## 2024-02-23 PROCEDURE — 85610 PROTHROMBIN TIME: CPT

## 2024-02-23 PROCEDURE — 37799 UNLISTED PX VASCULAR SURGERY: CPT

## 2024-02-23 PROCEDURE — 99223 1ST HOSP IP/OBS HIGH 75: CPT

## 2024-02-23 PROCEDURE — 1100000001 HC PRIVATE ROOM DAILY

## 2024-02-23 PROCEDURE — 2500000001 HC RX 250 WO HCPCS SELF ADMINISTERED DRUGS (ALT 637 FOR MEDICARE OP): Performed by: NURSE PRACTITIONER

## 2024-02-23 DEVICE — VLV EVOLUTFX-26 COMM US
Type: IMPLANTABLE DEVICE | Site: HEART | Status: FUNCTIONAL
Brand: EVOLUT™ FX

## 2024-02-23 DEVICE — LOADING SYS L-EVOLUTFX-2329
Type: IMPLANTABLE DEVICE | Status: NON-FUNCTIONAL
Brand: EVOLUT™ FX

## 2024-02-23 RX ORDER — ASPIRIN 325 MG
TABLET ORAL AS NEEDED
Status: DISCONTINUED | OUTPATIENT
Start: 2024-02-23 | End: 2024-02-23 | Stop reason: HOSPADM

## 2024-02-23 RX ORDER — CEFAZOLIN SODIUM 2 G/100ML
INJECTION, SOLUTION INTRAVENOUS CONTINUOUS PRN
Status: COMPLETED | OUTPATIENT
Start: 2024-02-23 | End: 2024-02-23

## 2024-02-23 RX ORDER — ENOXAPARIN SODIUM 100 MG/ML
40 INJECTION SUBCUTANEOUS EVERY 24 HOURS
Status: DISCONTINUED | OUTPATIENT
Start: 2024-02-23 | End: 2024-02-23

## 2024-02-23 RX ORDER — LEVOTHYROXINE SODIUM 125 UG/1
125 TABLET ORAL DAILY
Status: DISCONTINUED | OUTPATIENT
Start: 2024-02-24 | End: 2024-02-27 | Stop reason: HOSPADM

## 2024-02-23 RX ORDER — ENOXAPARIN SODIUM 100 MG/ML
30 INJECTION SUBCUTANEOUS DAILY
Status: DISCONTINUED | OUTPATIENT
Start: 2024-02-23 | End: 2024-02-27

## 2024-02-23 RX ORDER — SODIUM CHLORIDE, SODIUM LACTATE, POTASSIUM CHLORIDE, CALCIUM CHLORIDE 600; 310; 30; 20 MG/100ML; MG/100ML; MG/100ML; MG/100ML
75 INJECTION, SOLUTION INTRAVENOUS CONTINUOUS
Status: DISCONTINUED | OUTPATIENT
Start: 2024-02-23 | End: 2024-02-23

## 2024-02-23 RX ORDER — FENTANYL CITRATE 50 UG/ML
INJECTION, SOLUTION INTRAMUSCULAR; INTRAVENOUS AS NEEDED
Status: DISCONTINUED | OUTPATIENT
Start: 2024-02-23 | End: 2024-02-23 | Stop reason: HOSPADM

## 2024-02-23 RX ORDER — ZOLPIDEM TARTRATE 5 MG/1
5 TABLET ORAL NIGHTLY PRN
Status: DISCONTINUED | OUTPATIENT
Start: 2024-02-23 | End: 2024-02-27 | Stop reason: HOSPADM

## 2024-02-23 RX ORDER — ACETAMINOPHEN 325 MG/1
650 TABLET ORAL EVERY 4 HOURS PRN
Status: DISCONTINUED | OUTPATIENT
Start: 2024-02-23 | End: 2024-02-27 | Stop reason: HOSPADM

## 2024-02-23 RX ORDER — PANTOPRAZOLE SODIUM 40 MG/1
40 TABLET, DELAYED RELEASE ORAL
Status: DISCONTINUED | OUTPATIENT
Start: 2024-02-24 | End: 2024-02-27 | Stop reason: HOSPADM

## 2024-02-23 RX ORDER — MULTIVIT-MIN/IRON FUM/FOLIC AC 7.5 MG-4
1 TABLET ORAL DAILY
COMMUNITY

## 2024-02-23 RX ORDER — PROTAMINE SULFATE 10 MG/ML
INJECTION, SOLUTION INTRAVENOUS CONTINUOUS PRN
Status: COMPLETED | OUTPATIENT
Start: 2024-02-23 | End: 2024-02-23

## 2024-02-23 RX ORDER — QUININE SULFATE 324 MG/1
324 CAPSULE ORAL NIGHTLY
Status: DISCONTINUED | OUTPATIENT
Start: 2024-02-23 | End: 2024-02-23

## 2024-02-23 RX ORDER — ATORVASTATIN CALCIUM 40 MG/1
40 TABLET, FILM COATED ORAL NIGHTLY
Status: DISCONTINUED | OUTPATIENT
Start: 2024-02-23 | End: 2024-02-27 | Stop reason: HOSPADM

## 2024-02-23 RX ORDER — MIDAZOLAM HYDROCHLORIDE 1 MG/ML
INJECTION INTRAMUSCULAR; INTRAVENOUS AS NEEDED
Status: DISCONTINUED | OUTPATIENT
Start: 2024-02-23 | End: 2024-02-23 | Stop reason: HOSPADM

## 2024-02-23 RX ORDER — HEPARIN SODIUM 1000 [USP'U]/ML
INJECTION, SOLUTION INTRAVENOUS; SUBCUTANEOUS AS NEEDED
Status: DISCONTINUED | OUTPATIENT
Start: 2024-02-23 | End: 2024-02-23 | Stop reason: HOSPADM

## 2024-02-23 RX ORDER — CEFAZOLIN SODIUM 2 G/100ML
2 INJECTION, SOLUTION INTRAVENOUS ONCE
Status: DISCONTINUED | OUTPATIENT
Start: 2024-02-23 | End: 2024-02-23

## 2024-02-23 RX ORDER — SODIUM CHLORIDE 9 MG/ML
INJECTION, SOLUTION INTRAVENOUS CONTINUOUS PRN
Status: COMPLETED | OUTPATIENT
Start: 2024-02-23 | End: 2024-02-23

## 2024-02-23 RX ORDER — NAPROXEN SODIUM 220 MG/1
81 TABLET, FILM COATED ORAL DAILY
Status: DISCONTINUED | OUTPATIENT
Start: 2024-02-23 | End: 2024-02-27 | Stop reason: HOSPADM

## 2024-02-23 RX ORDER — LIDOCAINE HYDROCHLORIDE 20 MG/ML
INJECTION, SOLUTION INFILTRATION; PERINEURAL AS NEEDED
Status: DISCONTINUED | OUTPATIENT
Start: 2024-02-23 | End: 2024-02-23 | Stop reason: HOSPADM

## 2024-02-23 RX ORDER — VALSARTAN AND HYDROCHLOROTHIAZIDE 160; 12.5 MG/1; MG/1
1 TABLET, FILM COATED ORAL DAILY
COMMUNITY
End: 2024-02-27 | Stop reason: HOSPADM

## 2024-02-23 RX ORDER — FLUTICASONE PROPIONATE 50 MCG
2 SPRAY, SUSPENSION (ML) NASAL DAILY
Status: DISCONTINUED | OUTPATIENT
Start: 2024-02-23 | End: 2024-02-27 | Stop reason: HOSPADM

## 2024-02-23 RX ADMIN — ACETAMINOPHEN 650 MG: 325 TABLET ORAL at 16:19

## 2024-02-23 RX ADMIN — VALSARTAN: 160 TABLET, FILM COATED ORAL at 16:19

## 2024-02-23 RX ADMIN — ATORVASTATIN CALCIUM 40 MG: 40 TABLET, FILM COATED ORAL at 20:15

## 2024-02-23 RX ADMIN — ACETAMINOPHEN 650 MG: 325 TABLET ORAL at 20:15

## 2024-02-23 RX ADMIN — ENOXAPARIN SODIUM 30 MG: 100 INJECTION SUBCUTANEOUS at 20:27

## 2024-02-23 SDOH — SOCIAL STABILITY: SOCIAL INSECURITY: DOES ANYONE TRY TO KEEP YOU FROM HAVING/CONTACTING OTHER FRIENDS OR DOING THINGS OUTSIDE YOUR HOME?: NO

## 2024-02-23 SDOH — SOCIAL STABILITY: SOCIAL INSECURITY: DO YOU FEEL ANYONE HAS EXPLOITED OR TAKEN ADVANTAGE OF YOU FINANCIALLY OR OF YOUR PERSONAL PROPERTY?: NO

## 2024-02-23 SDOH — SOCIAL STABILITY: SOCIAL INSECURITY: WERE YOU ABLE TO COMPLETE ALL THE BEHAVIORAL HEALTH SCREENINGS?: YES

## 2024-02-23 SDOH — SOCIAL STABILITY: SOCIAL INSECURITY: HAVE YOU HAD THOUGHTS OF HARMING ANYONE ELSE?: NO

## 2024-02-23 SDOH — SOCIAL STABILITY: SOCIAL INSECURITY: HAS ANYONE EVER THREATENED TO HURT YOUR FAMILY OR YOUR PETS?: NO

## 2024-02-23 SDOH — SOCIAL STABILITY: SOCIAL INSECURITY: DO YOU FEEL UNSAFE GOING BACK TO THE PLACE WHERE YOU ARE LIVING?: NO

## 2024-02-23 SDOH — SOCIAL STABILITY: SOCIAL INSECURITY: ABUSE: ADULT

## 2024-02-23 SDOH — SOCIAL STABILITY: SOCIAL INSECURITY: ARE YOU OR HAVE YOU BEEN THREATENED OR ABUSED PHYSICALLY, EMOTIONALLY, OR SEXUALLY BY ANYONE?: NO

## 2024-02-23 SDOH — SOCIAL STABILITY: SOCIAL INSECURITY: ARE THERE ANY APPARENT SIGNS OF INJURIES/BEHAVIORS THAT COULD BE RELATED TO ABUSE/NEGLECT?: NO

## 2024-02-23 ASSESSMENT — ACTIVITIES OF DAILY LIVING (ADL)
HEARING - RIGHT EAR: FUNCTIONAL
GROOMING: INDEPENDENT
LACK_OF_TRANSPORTATION: NO
WALKS IN HOME: INDEPENDENT
DRESSING YOURSELF: INDEPENDENT
TOILETING: INDEPENDENT
LACK_OF_TRANSPORTATION: NO
JUDGMENT_ADEQUATE_SAFELY_COMPLETE_DAILY_ACTIVITIES: YES
HEARING - LEFT EAR: FUNCTIONAL
ADEQUATE_TO_COMPLETE_ADL: YES
BATHING: INDEPENDENT
PATIENT'S MEMORY ADEQUATE TO SAFELY COMPLETE DAILY ACTIVITIES?: YES
FEEDING YOURSELF: INDEPENDENT

## 2024-02-23 ASSESSMENT — LIFESTYLE VARIABLES
HOW OFTEN DO YOU HAVE A DRINK CONTAINING ALCOHOL: NEVER
SKIP TO QUESTIONS 9-10: 1
HOW OFTEN DO YOU HAVE 6 OR MORE DRINKS ON ONE OCCASION: NEVER
AUDIT-C TOTAL SCORE: 0
SUBSTANCE_ABUSE_PAST_12_MONTHS: NO
AUDIT-C TOTAL SCORE: 0
HOW MANY STANDARD DRINKS CONTAINING ALCOHOL DO YOU HAVE ON A TYPICAL DAY: PATIENT DOES NOT DRINK
PRESCIPTION_ABUSE_PAST_12_MONTHS: NO

## 2024-02-23 ASSESSMENT — PAIN DESCRIPTION - ORIENTATION: ORIENTATION: RIGHT

## 2024-02-23 ASSESSMENT — PAIN SCALES - GENERAL
PAINLEVEL_OUTOF10: 3
PAINLEVEL_OUTOF10: 0 - NO PAIN

## 2024-02-23 ASSESSMENT — PAIN - FUNCTIONAL ASSESSMENT
PAIN_FUNCTIONAL_ASSESSMENT: 0-10

## 2024-02-23 ASSESSMENT — COLUMBIA-SUICIDE SEVERITY RATING SCALE - C-SSRS
2. HAVE YOU ACTUALLY HAD ANY THOUGHTS OF KILLING YOURSELF?: NO
6. HAVE YOU EVER DONE ANYTHING, STARTED TO DO ANYTHING, OR PREPARED TO DO ANYTHING TO END YOUR LIFE?: NO
1. IN THE PAST MONTH, HAVE YOU WISHED YOU WERE DEAD OR WISHED YOU COULD GO TO SLEEP AND NOT WAKE UP?: NO

## 2024-02-23 ASSESSMENT — PATIENT HEALTH QUESTIONNAIRE - PHQ9
1. LITTLE INTEREST OR PLEASURE IN DOING THINGS: NOT AT ALL
SUM OF ALL RESPONSES TO PHQ9 QUESTIONS 1 & 2: 0
2. FEELING DOWN, DEPRESSED OR HOPELESS: NOT AT ALL

## 2024-02-23 ASSESSMENT — PAIN DESCRIPTION - LOCATION: LOCATION: INCISION

## 2024-02-23 ASSESSMENT — PAIN DESCRIPTION - DESCRIPTORS: DESCRIPTORS: ACHING

## 2024-02-23 ASSESSMENT — COGNITIVE AND FUNCTIONAL STATUS - GENERAL: PATIENT BASELINE BEDBOUND: NO

## 2024-02-23 NOTE — PROGRESS NOTES
Social Work Transitional Care Note: (Chart review)  - ICU TREATMENT PLAN: The patient was admitted today with complete heart block s/p elective TAVR  - Insurance coverage: Medicare and AAR  - Support system: The patient's friend Rylie is listed as NOK   - Planned Disposition: Pending medical outcome and rehab recommendations  - Barriers to Discharge: None at this time - Will continue to follow  -Anticipated Date of Discharge:  2/24  CRISTOPHER Law, LSW

## 2024-02-23 NOTE — H&P
Cardio: Traylor        HPI: 89-year-old white female with a past medical history significant for aortic stenosis, hypertension, hyperlipidemia, chronic kidney disease, glucose intolerance, and obesity was referred to us for aortic stenosis.  Patient states that she has symptoms of tiredness and shortness of breath for last 2 years or so but also has back pain and leg pain which limits her walking.  She endorses increasing fatigue over the last year or so but still able to do her activities like grocery walking slowly etc.  Patient uses cane for longer distances.    She does do balance exercises twice a week with a .   She has more chronic dyspnea on exertion when walking prolonged distances that has not changed in the last year.  The patient denies chest pain, palpitations, lightheadedness, syncope, orthopnea, paroxysmal nocturnal dyspnea, lower extremity edema, or bleeding problems.     The patient has no prior history of coronary artery disease, cerebrovascular disease, venous thromboembolic disease, diabetes, peripheral arterial disease/claudication, or bleeding.         She is here for TAVR     ROS:     Constitutional: fatigue  Eyes: no eyesight problems, no blurred vision, no diplopia, no eye pain, no purulent discharge from the eyes, eyes not red, no dryness of the eyes and no itching of the eyes.   ENT: no nosebleeds, no hearing loss, no tinnitus, no earache, no sore throat, no hoarseness, no swollen glands in the neck and no nasal discharge.   Cardiovascular: dyspnea on exertion, no chest pain  Respiratory: no chronic cough, not coughing up sputum, no wheezing that is consistent with asthma  Gastrointestinal: no change in bowel habits, no blood in stools, no diarrhea, no constipation, no nausea, no vomiting, no abdominal pain, no signs and symptoms of ulcer disease, no sybil colored stools and no intolerance to fatty foods.   Genitourinary: no hematuria,  no urinary frequency, no dysuria, no incontinence,  no burning sensation during urination, no urinary hesitancy, no nocturia, no genital lesion, no testicular pain, urinary stream is not smaller and urinary stream does not start and stop.   Musculoskeletal: no arthralgias, no myalgias, no joint swelling, no joint stiffness, no muscle weakness, no back pain, no limb pain, no limb swelling and no difficulty walking.   Skin: no skin rashes, no change in skin color and pigmentation, no skin lesions and no skin lumps.   Neurological: no seizures, no frequent falls, no headaches, no dizziness, no tingling, no fainting and no limb weakness.   Psychiatric: no depression, not suicidal, no confusion, no memory lapses or loss, no anxiety, no personality change and no emotional problems.   Endocrine: no goiter, no thyroid disorder, no diabetes mellitus, no excessive thirst, no dry skin, no cold intolerance, no heat intolerance, no erectile dysfunction, no increased urinary frequency, no proptosis and no deepening of the voice.   Hematologic/Lymphatic: no bleeding issues.   All other systems have been reviewed and are negative for complaint.         TAVR Workup:   - NYHA: 2  - Frailty 3/5:   - EKG: Normal sinus rhythm, QRS 90 ms   - TTE: 11/30/2023 EF 60%, dimensionless index 0.23 peak gradient 50 mmHg mean gradient 29 mmHg, aortic valve area 0.56 cm² V-max 3.5 m/s  There is low flow across the aortic valve, with a normal ejection fraction.     - STS  Procedure Type: Isolated AVR  Perioperative Outcome           Estimate %  Operative Mortality     7.71%  Morbidity & Mortality 13.3%  Stroke  1.9%  Renal Failure    3.94%  Reoperation     3.14%  Prolonged Ventilation 7.37%  Deep Sternal Wound Infection            0.046%  Long Hospital Stay (>14 days)            7.14%  Short Hospital Stay (<6 days)*            24.3%        Physical Exam:  VS: reviewed  Constitutional: alert and in no acute distress.   Eyes: no erythema, swelling or discharge from the eye .   ENT: no erythema, edema,  exudate or lesions .   Neck: neck is supple, no JVD  Pulmonary: no increased work of breathing or signs of respiratory distress , lungs clear to auscultation. , normal percussion of chest  and chest palpation normal .   Cardiovascular: RRR, 3/6 AMPARO RUSB,  no pitting edema  Abdomen: abdomen non-tender, no masses  and no hepatomegaly .   Neurologic: non-focal neurologic examination.                   Current Outpatient Medications   Medication Instructions    acetaminophen (Tylenol Extra Strength) 500 mg tablet 1 tablet, oral, As directed.<BR>    amoxicillin (Amoxil) 500 mg capsule TAKE 4 CAPSULES BY MOUTH 1 HOUR BEFORE PROCEDURE    aspirin (Ecotrin Low Strength) 81 mg EC tablet 1 tablet, oral, Nightly    atorvastatin (LIPITOR) 10 mg, oral, Daily    biotin 5 mg capsule 1 capsule, oral, Daily    calcium carbonate-vitamin D3 (Oscal-500) 500 mg-10 mcg (400 unit) tablet 2 tablets, oral, Nightly    denosumab (Prolia) 60 mg/mL syringe 1 mL, subcutaneous, Every 6 months    ergocalciferol (Vitamin D-2) 1.25 MG (00765 UT) capsule 1 capsule, oral, Weekly    fexofenadine (Allegra Allergy) 180 mg tablet 1 tablet, oral, As directed.<BR>    flaxseed oiL 1,000 mg capsule 1 capsule, oral, 2 times daily    fluticasone (Flonase) 50 mcg/actuation nasal spray 2 sprays, Each Nostril, Daily    ibuprofen (AdviL) 200 mg tablet 2 tablets, oral, Every 6 hours PRN    levothyroxine (Synthroid, Levoxyl) 125 mcg tablet 1 tablet, oral, Daily    mv-min-folic-K1-lycopen-lutein (Centrum Silver Ultra Men's) 547--300 mcg tablet 1 tablet, oral, Daily    omeprazole (PriLOSEC) 20 mg DR capsule 1 capsule, oral, Daily    quiNINE (Qualaquin) 324 mg capsule 1 capsule, oral, As directed.    valsartan-hydrochlorothiazide (Diovan-HCT) 80-12.5 mg tablet 1 tablet, oral, Daily    zolpidem (AMBIEN) 5 mg, oral, Nightly PRN         Impression:   This is a 89-year-old female with past medical history of hypertension who is symptomatic from her aortic stenosis.     She has paradoxical LFLG aortic stenosis    Plan:   Proceed with TAVR     We discussed all the risks associated with the procedure, including but not limited to stroke, MI, pericardial tamponade, vascular complications, infection and death were discussed with the patient. The risk of needing a permament pacemaker was also discussed in detail. The patient verbalized understanding and decided to proceed with the procedure.

## 2024-02-23 NOTE — PROGRESS NOTES
Pharmacy Medication History Review    Josee Henson is a 89 y.o. female admitted for Aortic stenosis. Pharmacy reviewed the patient's [id number] medications and allergies for accuracy.    The list below reflects the updated PTA list.   Comments regarding how patient may be taking medications differently can be found in the Admit Orders Activity  Prior to Admission Medications   Prescriptions Last Dose Informant Patient Reported?   acetaminophen (Tylenol Extra Strength) 500 mg tablet 2/22/2024 Self Yes   Sig: Take 2 tablets (1,000 mg) by mouth every 8 hours if needed for mild pain (1 - 3), headaches or fever (temp greater than 38.0 C).    amoxicillin (Amoxil) 500 mg capsule  Self Yes   Sig: TAKE 4 CAPSULES BY MOUTH 1 HOUR BEFORE PROCEDURE   aspirin (Ecotrin Low Strength) 81 mg EC tablet 2/22/2024 at HS Self Yes   Sig: Take 1 tablet (81 mg) by mouth once daily at bedtime.   atorvastatin (Lipitor) 40 mg tablet 2/22/2024 Self No   Sig: Take 1 tablet (40 mg) by mouth once daily.   biotin 5 mg capsule Past Week Self Yes   Sig: Take 1 capsule (5 mg) by mouth once daily.   calcium carbonate-vitamin D3 (Oscal-500) 500 mg-10 mcg (400 unit) tablet 2/22/2024 at HS Self Yes   Sig: Take 2 tablets by mouth once daily at bedtime.   denosumab (Prolia) 60 mg/mL syringe 1/3/2024 Self Yes   Sig: Inject 1 mL (60 mg) under the skin every 6 months.   fexofenadine (Allegra Allergy) 180 mg tablet 2/22/2024 Self Yes   Sig: Take 1 tablet (180 mg) by mouth once daily.   flaxseed oiL 1,000 mg capsule 2/22/2024 Self Yes   Sig: Take 1 capsule (1,000 mg) by mouth 2 times a day.   fluticasone (Flonase) 50 mcg/actuation nasal spray 2/22/2024 Self Yes   Sig: Administer 2 sprays into each nostril once daily.   levothyroxine (Synthroid, Levoxyl) 125 mcg tablet 2/23/2024 Self Yes   Sig: Take 1 tablet (125 mcg) by mouth once daily.   multivitamin with minerals tablet 2/22/2024 Self Yes   Sig: Take 1 tablet by mouth once daily.   omeprazole  "(PriLOSEC) 20 mg DR capsule  Self Yes   Sig: Take 1 capsule (20 mg) by mouth once daily.   omeprazole magnesium (PRILOSEC ORAL)  Self Yes   Sig: Take 20 mg by mouth once daily.   quiNINE (Qualaquin) 324 mg capsule 2/22/2024 at HS Self Yes   Sig: Take 1 capsule (324 mg) by mouth once daily at bedtime. For leg cramps   valsartan-hydrochlorothiazide (Diovan-HCT) 160-12.5 mg tablet 2/22/2024 Self Yes   Sig: Take 1 tablet by mouth once daily.   zolpidem (Ambien) 5 mg tablet 2/22/2024 Self No   Sig: Take 1 tablet (5 mg) by mouth as needed at bedtime for sleep.      Facility-Administered Medications: None        The list below reflects the updated allergy list. Please review each documented allergy for additional clarification and justification.  Allergies  Reviewed by Rickie Siegel RPh on 2/23/2024        Severity Reactions Comments    Tetracycline Low Rash, Unknown           M2B service not offered prior to surgery, please reassess prior to patient discharge if Meds to Beds is desired.  Local Rx: [address] [place], [place]        Sources:   Pt interview - knows medications and supplements well  Dispense hx  [place]  OHIP medication list reviewed   11/09/2023 PCP visit     Additional Comments:  Quinine - pt takes for leg cramps. Gets from [facility]. Takes 1 capsule every night.       Rickie Siegel PharmD  Transitions of Care Pharmacist  02/23/24     Secure Chat preferred   If no response call [phone] or [employer] \"Med Rec\"   "

## 2024-02-23 NOTE — Clinical Note
Patient Clipped and Prepped: left chest. Prepped with ChloraPrep, a minimum of 3 minute dry time, longer if needed, no pooling noted, patient draped in sterile fashion. Chloraprep x2, duraprep x1

## 2024-02-23 NOTE — H&P
History Of Present Illness  Josee Henson is a 89 y.o. female presenting with complete heart block s/p elective outpatient TAVR with transvenous pacer in place.     Patient was noted to have significant aortic stenosis. She has had symptoms of shortness of breath and tiredness for the last two years, and her exertion when walking long distances has been about stable over the last year. She was scheduled for TAVR due to her symptoms and significant aortic stenosis. She is able to walk around her house, and will go up and down the stairs twice a week. She lives at home independently.    Additional medical history notable for HTN on valsartan/hydrochlorothiazide, HLD on atorvastatin, hypothyroidism on levothyroxine, GERD on omeprazole, and leg cramps on quinine at bedtime. She is also on aspirin daily. Additional supplements include Ca, MVI, Vit D, biotin. She takes allegra daily. She denies diabetes, lung disease, liver disease, or kidney disease though PMD note from Nov 2023 notes CKD stage 3a.     On admission to CICU, patient is complaining of neck pain at site of line insertion but otherwise no chest pain, difficulty breathing, or confusion.      Past Medical History  Past Medical History:   Diagnosis Date    Aortic stenosis     Hyperlipidemia     Hypertension        Surgical History  Past Surgical History:   Procedure Laterality Date    CARDIAC CATHETERIZATION N/A 2/2/2024    Procedure: Left And Right Heart Cath, With LV;  Surgeon: Pérez Traylor MD;  Location: [facility] Cardiac [facility];  Service: Cardiovascular;  Laterality: N/A;  Hydration needed @ 8:30 AM    COLONOSCOPY  04/15/2014    Complete Colonoscopy    OTHER SURGICAL HISTORY  04/15/2014    Revision Of Total Knee Arthroplasty    TOTAL KNEE ARTHROPLASTY  04/15/2014    Total Knee Arthroplasty        Social History  She reports that she has never smoked. She has never used smokeless tobacco. She reports current alcohol use of about 1.0 standard drink of  alcohol per week. She reports that she does not use drugs.    Family History  Family History   Problem Relation Name Age of Onset    Alzheimer's disease Mother  83    Other (ovarian mass) Mother      Diabetes Father  69    Leukemia Father      Heart attack Father          Allergies  Tetracycline    Cardiac History  CT TAVR 1/8/24  IMPRESSION:  1. Scattered mild atherosclerotic changes involving the  thoracoabdominal aorta and its branches. The access vessels are  patent.  2. Severe calcifications of the aortic valve correlating with history  of aortic stenosis.  3. Moderate coronary artery calcification. Correlate with coronary  artery disease risk factors.  4. Small hiatal hernia.  5. Scattered colonic diverticula with no evidence of acute  diverticulitis.  6. Other findings as described above.    US Carotids 11/30/23  CONCLUSIONS:  Right Carotid: Findings are consistent with less than 50% stenosis of the right proximal internal carotid artery. Laminar flow seen by color Doppler. Right external carotid artery appears patent with no evidence of stenosis. The right vertebral artery is patent with antegrade flow. No evidence of hemodynamically significant stenosis in the right subclavian artery.  Left Carotid: Findings are consistent with less than 50% stenosis of the left proximal internal carotid artery. Laminar flow seen by color Doppler. Left external carotid artery appears patent with no evidence of stenosis. The left vertebral artery is patent with antegrade flow. No evidence of hemodynamically significant stenosis in the left subclavian artery.    Cardiac Cath 2/2/24  CONCLUSIONS:   1. 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    TTE 11/30/23  CONCLUSIONS:   1. Left ventricular systolic function is normal with a 60% estimated ejection fraction.   2. Spectral Doppler shows a pseudonormal pattern of left ventricular diastolic filling.   3. Moderate to severe aortic valve stenosis. pk/mn 50/29 mmHg, DI  "0.23, ADITYA 0.6 suggests low flow low gradient severe type.   4. There is moderate to severe aortic valve cusp calcification.   5. Compared with study from 11/8/2022, dimensionless index 0.23 down from 0.34 suggesting more severe aortic stenosis. Clinical correlation suggested.    Review of Systems     Physical Exam  General: Awake, alert, in no acute distress, pleasantly conversant  HEENT: Normocephalic, atraumatic, MMM. R internal jugular line in place, c/d/I without visible hematoma  CV: Bradycardic to 60s, systolic murmur at RUSB  Lungs: Clear to auscultation bilaterally with no wheezes, crackles, or rhonchi  GI: Soft, nontender, nondistended  Extremities: 2+ pulses bilaterally in radial and dorsalis pedis pulses, no lower extremity edema  Neuro: Moves all extremities equally, AOx3    Last Recorded Vitals  Blood pressure 134/76, pulse 70, temperature 36.2 °C (97.2 °F), temperature source Temporal, resp. rate 16, height 1.549 m (5' 0.98\"), weight 72.6 kg (160 lb 0.9 oz), SpO2 97 %.    Relevant Results        Scheduled medications  aspirin, 81 mg, oral, Daily  atorvastatin, 40 mg, oral, Nightly  fluticasone, 2 spray, Each Nostril, Daily  [START ON 2/24/2024] levothyroxine, 125 mcg, oral, Daily  [START ON 2/24/2024] pantoprazole, 40 mg, oral, Daily before breakfast  perflutren lipid microspheres, 0.5-10 mL of dilution, intravenous, Once in imaging  perflutren lipid microspheres, 0.5-10 mL of dilution, intravenous, Once in imaging  perflutren lipid microspheres, 0.5-10 mL of dilution, intravenous, Once in imaging  quiNINE, 324 mg, oral, Nightly  valsartan 160 mg, hydroCHLOROthiazide 12.5 mg for Diovan HCT, , oral, Daily      Continuous medications     PRN medications  PRN medications: acetaminophen, zolpidem     Results for orders placed or performed during the hospital encounter of 02/23/24 (from the past 24 hour(s))   ECG 12 lead   Result Value Ref Range    Ventricular Rate 63 BPM    Atrial Rate 63 BPM    AK " Interval 136 ms    QRS Duration 90 ms    QT Interval 424 ms    QTC Calculation(Bazett) 433 ms    P Axis 3 degrees    R Axis -19 degrees    T Axis 36 degrees    QRS Count 10 beats    Q Onset 225 ms    P Onset 157 ms    P Offset 204 ms    T Offset 437 ms    QTC Fredericia 431 ms      ECG 12 lead    Result Date: 2/23/2024  Normal sinus rhythm with sinus arrhythmia Minimal voltage criteria for LVH, may be normal variant Borderline ECG When compared with ECG of 04-JAN-2024 09:34, No significant change was found      Assessment/Plan   Principal Problem:    Aortic stenosis  Active Problems:    Nonrheumatic aortic valve stenosis    S/P TAVR (transcatheter aortic valve replacement)    89 yo female admitted to CICU for complete heart block s/p TAVR for moderate to severe aortic stenosis. Patient is hemodynamically stable with transvenous pacer in place. Additional history includes hypertension, hyperlipidemia, hypothyroidism, GERD, and multiple joint replacements. Plan to complete post-TAVR workup tomorrow with guidance from structural heart team.    NEURO  No acute issues     CV  #Moderate to severe aortic stenosis  #s/pTAVR  - post-procedure  - CBC post-procedure    #complete heart block s/p TAVR  - Transvenous pacer in place   - Monitor on telemetry    #HTN  - Continue home valsartan/hydrochlorothiazide    #HLD  - Continue home atorvastatin    Pulm  Stable on room air    Endo  #hypothyroidism  - Continue home levothyroxine    Renal  Chart diagnosis of CKD  - Obtain RFP, Mg post procedure    F: none  E: replete for K>4, Mg >2  N: regular diet  G: continue home omeprazole  DVT ppx: lovenox 30mg, SCDs    Code Discussion: Full Code, if clinical change and coding would have minimal chance of recovery, children would re-discuss  HCPOA completed, children (Narayan and Negrita) are decision makers. Phone: Narayan [phone] Carmen [phone] (home)    Patient will be seen and discussed with attending Dr. Villaseñor in the morning, will  discuss with him for any urgent questions.    Liseth Almeida MD  Internal Medicine and Pediatrics, PGY2

## 2024-02-24 ENCOUNTER — APPOINTMENT (OUTPATIENT)
Dept: CARDIOLOGY | Facility: HOSPITAL | Age: 89
DRG: 267 | End: 2024-02-24
Payer: MEDICARE

## 2024-02-24 PROBLEM — I45.10 RIGHT BUNDLE BRANCH BLOCK (RBBB): Status: ACTIVE | Noted: 2024-02-24

## 2024-02-24 PROBLEM — Z95.0 PRESENCE OF TEMPORARY TRANSVENOUS CARDIAC PACEMAKER: Status: ACTIVE | Noted: 2024-02-24

## 2024-02-24 PROBLEM — I97.89 COMPLETE HEART BLOCK, POST-SURGICAL (MULTI): Status: ACTIVE | Noted: 2024-02-24

## 2024-02-24 PROBLEM — I44.2 COMPLETE HEART BLOCK, POST-SURGICAL (MULTI): Status: ACTIVE | Noted: 2024-02-24

## 2024-02-24 LAB
ALBUMIN SERPL BCP-MCNC: 3.5 G/DL (ref 3.4–5)
ANION GAP SERPL CALC-SCNC: 13 MMOL/L (ref 10–20)
AORTIC VALVE MEAN GRADIENT: 8 MMHG
AORTIC VALVE PEAK VELOCITY: 2.03 M/S
AV PEAK GRADIENT: 16.5 MMHG
AVA (PEAK VEL): 1.62 CM2
AVA (VTI): 1.81 CM2
BUN SERPL-MCNC: 34 MG/DL (ref 6–23)
CALCIUM SERPL-MCNC: 10 MG/DL (ref 8.6–10.6)
CHLORIDE SERPL-SCNC: 104 MMOL/L (ref 98–107)
CO2 SERPL-SCNC: 27 MMOL/L (ref 21–32)
CREAT SERPL-MCNC: 1.49 MG/DL (ref 0.5–1.05)
EGFRCR SERPLBLD CKD-EPI 2021: 33 ML/MIN/1.73M*2
EJECTION FRACTION APICAL 4 CHAMBER: 77.8
EJECTION FRACTION: 75 %
ERYTHROCYTE [DISTWIDTH] IN BLOOD BY AUTOMATED COUNT: 14.4 % (ref 11.5–14.5)
GLUCOSE SERPL-MCNC: 79 MG/DL (ref 74–99)
HCT VFR BLD AUTO: 30.7 % (ref 36–46)
HGB BLD-MCNC: 10.2 G/DL (ref 12–16)
LEFT VENTRICLE INTERNAL DIMENSION DIASTOLE: 5 CM (ref 3.5–6)
LEFT VENTRICULAR OUTFLOW TRACT DIAMETER: 1.8 CM
MAGNESIUM SERPL-MCNC: 1.99 MG/DL (ref 1.6–2.4)
MCH RBC QN AUTO: 28.3 PG (ref 26–34)
MCHC RBC AUTO-ENTMCNC: 33.2 G/DL (ref 32–36)
MCV RBC AUTO: 85 FL (ref 80–100)
MITRAL VALVE E/A RATIO: 0.72
MITRAL VALVE E/E' RATIO: 9.55
NRBC BLD-RTO: 0 /100 WBCS (ref 0–0)
PHOSPHATE SERPL-MCNC: 4.2 MG/DL (ref 2.5–4.9)
PLATELET # BLD AUTO: 162 X10*3/UL (ref 150–450)
POTASSIUM SERPL-SCNC: 4.2 MMOL/L (ref 3.5–5.3)
RBC # BLD AUTO: 3.6 X10*6/UL (ref 4–5.2)
RIGHT VENTRICLE FREE WALL PEAK S': 16.2 CM/S
SODIUM SERPL-SCNC: 140 MMOL/L (ref 136–145)
TRICUSPID ANNULAR PLANE SYSTOLIC EXCURSION: 1.8 CM
WBC # BLD AUTO: 6.9 X10*3/UL (ref 4.4–11.3)

## 2024-02-24 PROCEDURE — 1100000001 HC PRIVATE ROOM DAILY

## 2024-02-24 PROCEDURE — 93308 TTE F-UP OR LMTD: CPT | Performed by: INTERNAL MEDICINE

## 2024-02-24 PROCEDURE — 93005 ELECTROCARDIOGRAM TRACING: CPT

## 2024-02-24 PROCEDURE — 2500000001 HC RX 250 WO HCPCS SELF ADMINISTERED DRUGS (ALT 637 FOR MEDICARE OP): Performed by: NURSE PRACTITIONER

## 2024-02-24 PROCEDURE — 2500000004 HC RX 250 GENERAL PHARMACY W/ HCPCS (ALT 636 FOR OP/ED): Performed by: NURSE PRACTITIONER

## 2024-02-24 PROCEDURE — 93325 DOPPLER ECHO COLOR FLOW MAPG: CPT | Performed by: INTERNAL MEDICINE

## 2024-02-24 PROCEDURE — 2500000002 HC RX 250 W HCPCS SELF ADMINISTERED DRUGS (ALT 637 FOR MEDICARE OP, ALT 636 FOR OP/ED): Mod: MUE

## 2024-02-24 PROCEDURE — 2500000004 HC RX 250 GENERAL PHARMACY W/ HCPCS (ALT 636 FOR OP/ED)

## 2024-02-24 PROCEDURE — 80069 RENAL FUNCTION PANEL: CPT

## 2024-02-24 PROCEDURE — 93321 DOPPLER ECHO F-UP/LMTD STD: CPT | Performed by: INTERNAL MEDICINE

## 2024-02-24 PROCEDURE — 99291 CRITICAL CARE FIRST HOUR: CPT

## 2024-02-24 PROCEDURE — 99222 1ST HOSP IP/OBS MODERATE 55: CPT | Performed by: INTERNAL MEDICINE

## 2024-02-24 PROCEDURE — 37799 UNLISTED PX VASCULAR SURGERY: CPT

## 2024-02-24 PROCEDURE — 2500000002 HC RX 250 W HCPCS SELF ADMINISTERED DRUGS (ALT 637 FOR MEDICARE OP, ALT 636 FOR OP/ED): Performed by: NURSE PRACTITIONER

## 2024-02-24 PROCEDURE — 99233 SBSQ HOSP IP/OBS HIGH 50: CPT | Performed by: HOSPITALIST

## 2024-02-24 PROCEDURE — 93325 DOPPLER ECHO COLOR FLOW MAPG: CPT

## 2024-02-24 PROCEDURE — 2500000001 HC RX 250 WO HCPCS SELF ADMINISTERED DRUGS (ALT 637 FOR MEDICARE OP)

## 2024-02-24 PROCEDURE — 85027 COMPLETE CBC AUTOMATED: CPT

## 2024-02-24 PROCEDURE — 83735 ASSAY OF MAGNESIUM: CPT

## 2024-02-24 RX ORDER — MAGNESIUM SULFATE HEPTAHYDRATE 40 MG/ML
2 INJECTION, SOLUTION INTRAVENOUS ONCE
Status: COMPLETED | OUTPATIENT
Start: 2024-02-24 | End: 2024-02-24

## 2024-02-24 RX ADMIN — MAGNESIUM SULFATE HEPTAHYDRATE 2 G: 40 INJECTION, SOLUTION INTRAVENOUS at 08:25

## 2024-02-24 RX ADMIN — PANTOPRAZOLE SODIUM 40 MG: 40 TABLET, DELAYED RELEASE ORAL at 05:41

## 2024-02-24 RX ADMIN — ATORVASTATIN CALCIUM 40 MG: 40 TABLET, FILM COATED ORAL at 20:34

## 2024-02-24 RX ADMIN — VALSARTAN: 160 TABLET, FILM COATED ORAL at 08:24

## 2024-02-24 RX ADMIN — LEVOTHYROXINE SODIUM 125 MCG: 125 TABLET ORAL at 08:25

## 2024-02-24 RX ADMIN — ACETAMINOPHEN 650 MG: 325 TABLET ORAL at 14:10

## 2024-02-24 RX ADMIN — ENOXAPARIN SODIUM 30 MG: 100 INJECTION SUBCUTANEOUS at 08:25

## 2024-02-24 RX ADMIN — ASPIRIN 81 MG 81 MG: 81 TABLET ORAL at 08:24

## 2024-02-24 RX ADMIN — PERFLUTREN 1 ML OF DILUTION: 6.52 INJECTION, SUSPENSION INTRAVENOUS at 10:54

## 2024-02-24 ASSESSMENT — PAIN SCALES - GENERAL
PAINLEVEL_OUTOF10: 3
PAINLEVEL_OUTOF10: 0 - NO PAIN

## 2024-02-24 ASSESSMENT — PAIN - FUNCTIONAL ASSESSMENT: PAIN_FUNCTIONAL_ASSESSMENT: 0-10

## 2024-02-24 NOTE — PROGRESS NOTES
"Josee Henson is a 89 y.o. female on day 1 of admission presenting with Aortic stenosis.    Subjective   NAEO. She was admitted with transvenous pacer set at 60bpm. Yesterday after admission to CICU, patient briefly was able to sustain HR without pacing (with pacemaker set at 40 bpm). After 1-2 hours, patient developed heart block and HR dropped to the 40s with hypotension and decreased responsiveness. Pacer was increased to 60 bpm, she was given 1L IVF and BP and mentation improved.       Objective     Physical Exam  General: Awake, alert, in no acute distress, pleasantly conversant  HEENT: Normocephalic, atraumatic, MMM. R internal jugular line in place, c/d/I without visible hematoma  CV: Bradycardic to 60s, systolic murmur at RUSB  Lungs: Clear to auscultation bilaterally with no wheezes, crackles, or rhonchi  GI: Soft, nontender, nondistended  Extremities: 2+ pulses bilaterally in radial and dorsalis pedis pulses, no lower extremity edema  Skin: Bilateral groin entry sites with bandage in place, mild bruising without palpable hematoma  Neuro: Moves all extremities equally, Aox3    Last Recorded Vitals  Blood pressure 133/51, pulse 52, temperature 35.9 °C (96.6 °F), resp. rate 10, height 1.549 m (5' 0.98\"), weight 72.6 kg (160 lb 0.9 oz), SpO2 98 %.  Intake/Output last 3 Shifts:  I/O last 3 completed shifts:  In: 340 (4.7 mL/kg) [P.O.:340]  Out: 10 (0.1 mL/kg) [Blood:10]  Weight: 72.6 kg     Relevant Results  Scheduled medications  aspirin, 81 mg, oral, Daily  atorvastatin, 40 mg, oral, Nightly  enoxaparin, 30 mg, subcutaneous, Daily  fluticasone, 2 spray, Each Nostril, Daily  levothyroxine, 125 mcg, oral, Daily  magnesium sulfate, 2 g, intravenous, Once  pantoprazole, 40 mg, oral, Daily before breakfast  perflutren lipid microspheres, 0.5-10 mL of dilution, intravenous, Once in imaging  perflutren lipid microspheres, 0.5-10 mL of dilution, intravenous, Once in imaging  perflutren lipid microspheres, 0.5-10 " mL of dilution, intravenous, Once in imaging  valsartan 160 mg, hydroCHLOROthiazide 12.5 mg for Diovan HCT, , oral, Daily      Continuous medications     PRN medications  PRN medications: acetaminophen, zolpidem    Results for orders placed or performed during the hospital encounter of 02/23/24 (from the past 24 hour(s))   ECG 12 lead   Result Value Ref Range    Ventricular Rate 63 BPM    Atrial Rate 63 BPM    MI Interval 136 ms    QRS Duration 90 ms    QT Interval 424 ms    QTC Calculation(Bazett) 433 ms    P Axis 3 degrees    R Axis -19 degrees    T Axis 36 degrees    QRS Count 10 beats    Q Onset 225 ms    P Onset 157 ms    P Offset 204 ms    T Offset 437 ms    QTC Fredericia 431 ms   Transthoracic Echo (TTE) Limited   Result Value Ref Range    AV pk jesusita 3.14 m/s    AV mn grad 22.0 mmHg    LVOT diam 1.80 cm    LV biplane EF 75 %    MV avg E/e' ratio 6.45     MV E/A ratio 1.09     LA vol index A/L 29.8 ml/m2    Tricuspid annular plane systolic excursion 1.9 cm    RV free wall pk S' 10.70 cm/s    LVIDd 4.90 cm    RVSP 35.5 mmHg    Aortic Valve Area by Continuity of VTI 0.83 cm2    Aortic Valve Area by Continuity of Peak Velocity 0.92 cm2    AV pk grad 39.4 mmHg    LV A4C EF 78.6    Basic metabolic panel   Result Value Ref Range    Glucose 80 74 - 99 mg/dL    Sodium 140 136 - 145 mmol/L    Potassium 4.1 3.5 - 5.3 mmol/L    Chloride 105 98 - 107 mmol/L    Bicarbonate 26 21 - 32 mmol/L    Anion Gap 13 10 - 20 mmol/L    Urea Nitrogen 34 (H) 6 - 23 mg/dL    Creatinine 1.47 (H) 0.50 - 1.05 mg/dL    eGFR 34 (L) >60 mL/min/1.73m*2    Calcium 9.8 8.6 - 10.6 mg/dL   CBC and Auto Differential   Result Value Ref Range    WBC 5.9 4.4 - 11.3 x10*3/uL    nRBC 0.0 0.0 - 0.0 /100 WBCs    RBC 3.61 (L) 4.00 - 5.20 x10*6/uL    Hemoglobin 10.2 (L) 12.0 - 16.0 g/dL    Hematocrit 32.8 (L) 36.0 - 46.0 %    MCV 91 80 - 100 fL    MCH 28.3 26.0 - 34.0 pg    MCHC 31.1 (L) 32.0 - 36.0 g/dL    RDW 14.6 (H) 11.5 - 14.5 %    Platelets 180 150 -  450 x10*3/uL    Neutrophils % 65.7 40.0 - 80.0 %    Immature Granulocytes %, Automated 0.5 0.0 - 0.9 %    Lymphocytes % 23.9 13.0 - 44.0 %    Monocytes % 7.0 2.0 - 10.0 %    Eosinophils % 2.2 0.0 - 6.0 %    Basophils % 0.7 0.0 - 2.0 %    Neutrophils Absolute 3.85 1.60 - 5.50 x10*3/uL    Immature Granulocytes Absolute, Automated 0.03 0.00 - 0.50 x10*3/uL    Lymphocytes Absolute 1.40 0.80 - 3.00 x10*3/uL    Monocytes Absolute 0.41 0.05 - 0.80 x10*3/uL    Eosinophils Absolute 0.13 0.00 - 0.40 x10*3/uL    Basophils Absolute 0.04 0.00 - 0.10 x10*3/uL   Protime-INR   Result Value Ref Range    Protime 15.1 (H) 9.8 - 12.8 seconds    INR 1.3 (H) 0.9 - 1.1   Magnesium   Result Value Ref Range    Magnesium 1.77 1.60 - 2.40 mg/dL   CBC   Result Value Ref Range    WBC 6.9 4.4 - 11.3 x10*3/uL    nRBC 0.0 0.0 - 0.0 /100 WBCs    RBC 3.60 (L) 4.00 - 5.20 x10*6/uL    Hemoglobin 10.2 (L) 12.0 - 16.0 g/dL    Hematocrit 30.7 (L) 36.0 - 46.0 %    MCV 85 80 - 100 fL    MCH 28.3 26.0 - 34.0 pg    MCHC 33.2 32.0 - 36.0 g/dL    RDW 14.4 11.5 - 14.5 %    Platelets 162 150 - 450 x10*3/uL   Renal function panel   Result Value Ref Range    Glucose 79 74 - 99 mg/dL    Sodium 140 136 - 145 mmol/L    Potassium 4.2 3.5 - 5.3 mmol/L    Chloride 104 98 - 107 mmol/L    Bicarbonate 27 21 - 32 mmol/L    Anion Gap 13 10 - 20 mmol/L    Urea Nitrogen 34 (H) 6 - 23 mg/dL    Creatinine 1.49 (H) 0.50 - 1.05 mg/dL    eGFR 33 (L) >60 mL/min/1.73m*2    Calcium 10.0 8.6 - 10.6 mg/dL    Phosphorus 4.2 2.5 - 4.9 mg/dL    Albumin 3.5 3.4 - 5.0 g/dL   Magnesium   Result Value Ref Range    Magnesium 1.99 1.60 - 2.40 mg/dL           Assessment/Plan   Principal Problem:    Aortic stenosis  Active Problems:    Nonrheumatic aortic valve stenosis    S/P TAVR (transcatheter aortic valve replacement)    89 yo female admitted to CICU for complete heart block s/p TAVR for moderate to severe aortic stenosis. Patient is hemodynamically stable with transvenous pacer in place.  Additional history includes hypertension, hyperlipidemia, hypothyroidism, GERD, and multiple joint replacements. On POD 0, patient was able to tolerate several hours of native HR without pacing, however she later developed hypotension and bradycardia and required pacing again to the 60s with improvement in BP and received 1L IV fluids. EKG demonstrating RBBB. Plan to complete post-TAVR workup tomorrow with guidance from structural heart team.    Updates 2/24:  - EP consult for likely pacemaker   - Patient's EKG showing RBBB  - post-TAVR monitoring per structural heart    NEURO  No acute issues     CV  #Moderate to severe aortic stenosis  #s/pTAVR  - CBC post-procedure    #complete heart block s/p TAVR  - Transvenous pacer in place, HR set at 60bpm  - Monitor on telemetry  [ ] consult EP for pacemaker    #HTN  - Continue home valsartan/hydrochlorothiazide    #HLD  - Continue home atorvastatin    Pulm  Stable on room air    Endo  #hypothyroidism  - Continue home levothyroxine    Renal  Chart diagnosis of CKD  - Creatinine stable at 1.49 (baseline 1.5-1.6)  - Obtain RFP, Mg post procedure    F: none  E: replete for K>4, Mg >2  N: regular diet  G: continue home omeprazole  DVT ppx: lovenox 30mg, SCDs    Code Discussion: Full Code, if clinical change and coding would have minimal chance of recovery, children would re-discuss  HCPOA completed, children (Narayan and Negrita) are decision makers. Phone: Narayan [phone] Carmen [phone] (home)      Liseth Almeida MD

## 2024-02-24 NOTE — PROGRESS NOTES
Subjective Data:  Patient has no acute complaints    Overnight Events:    Patient had episodes of overnight pacing.     Objective Data:  Last Recorded Vitals:  Vitals:    02/24/24 0900 02/24/24 1000 02/24/24 1100 02/24/24 1200   BP: 106/71 166/89 111/67 (!) 123/47   BP Location:       Patient Position:       Pulse: 55 73 63 75   Resp: 12 19 18 23   Temp:       TempSrc:       SpO2: 94% 94% 96% 95%   Weight:       Height:           Last Labs:  CBC - 2/24/2024:  3:12 AM  6.9 10.2 162    30.7      CMP - 2/24/2024:  3:12 AM  10.0 6.0 19 --- 0.5   4.2 3.5 13 73      PTT - No results in last year.  1.3   15.1 _     HGBA1C   Date/Time Value Ref Range Status   10/26/2023 09:42 AM 6.0 see below % Final   06/16/2020 12:53 PM 6.4 % Final     Comment:          Diagnosis of Diabetes-Adults   Non-Diabetic: < or = 5.6%   Increased risk for developing diabetes: 5.7-6.4%   Diagnostic of diabetes: > or = 6.5%  .       Monitoring of Diabetes                Age (y)     Therapeutic Goal (%)   Adults:          >18           <7.0   Pediatrics:    13-18           <7.5                   7-12           <8.0                   0- 6            7.5-8.5   American Diabetes Association. Diabetes Care 33(S1), Jan 2010.     06/17/2019 02:06 PM 6.2 % Final     Comment:          Diagnosis of Diabetes-Adults   Non-Diabetic: < or = 5.6%   Increased risk for developing diabetes: 5.7-6.4%   Diagnostic of diabetes: > or = 6.5%  .       Monitoring of Diabetes                Age (y)     Therapeutic Goal (%)   Adults:          >18           <7.0   Pediatrics:    13-18           <7.5                   7-12           <8.0                   0- 6            7.5-8.5   American Diabetes Association. Diabetes Care 33(S1), Jan 2010.       LDLCALC   Date/Time Value Ref Range Status   10/26/2023 09:42 AM 96 <=99 mg/dL Final     Comment:                                 Near   Borderline      AGE      Desirable  Optimal    High     High     Very High     0-19 Y     0 -  109     ---    110-129   >/= 130     ----    20-24 Y     0 - 119     ---    120-159   >/= 160     ----      >24 Y     0 -  99   100-129  130-159   160-189     >/=190       VLDL   Date/Time Value Ref Range Status   10/26/2023 09:42 AM 27 0 - 40 mg/dL Final   10/27/2022 09:55 AM 23 0 - 40 mg/dL Final   10/14/2021 10:50 AM 24 0 - 40 mg/dL Final   10/22/2020 09:36 AM 24 0 - 40 mg/dL Final      Last I/O:  I/O last 3 completed shifts:  In: 340 (4.7 mL/kg) [P.O.:340]  Out: 10 (0.1 mL/kg) [Blood:10]  Weight: 72.6 kg         Ejection Fractions:  EF   Date/Time Value Ref Range Status   02/24/2024 11:22 AM 75 %    02/23/2024 01:40 PM 75 %    11/30/2023 11:10 AM 69       Inpatient Medications:  Scheduled medications   Medication Dose Route Frequency    aspirin  81 mg oral Daily    atorvastatin  40 mg oral Nightly    enoxaparin  30 mg subcutaneous Daily    fluticasone  2 spray Each Nostril Daily    levothyroxine  125 mcg oral Daily    pantoprazole  40 mg oral Daily before breakfast    perflutren lipid microspheres  0.5-10 mL of dilution intravenous Once in imaging    perflutren lipid microspheres  0.5-10 mL of dilution intravenous Once in imaging    valsartan 160 mg, hydroCHLOROthiazide 12.5 mg for Diovan HCT   oral Daily     PRN medications   Medication    acetaminophen    zolpidem     Continuous Medications   Medication Dose Last Rate       Physical Exam:  Gen: Alert, awake, O x 3  Head: No evidence of trauma  Neck: Right IJ pacemaker in place  Heart: RRR, no murmur, no leg edema  Lungs: Bilateral CTA  Abd: Soft, NT, ND  Ext: Bilateral access sites unremarkable  Neuro: CN II-XII intact, 5/5 bilateral        Assessment/Plan   89-year-old lady with severe symptomatic aortic stenosis who came in for elective TAVR procedure.  She received a 26 mm evolute FX valve.  She developed complete heart block postprocedure which required transient pacing.    The patient will be evaluated by EP team as she had episodes of heart block  overnight requiring pacing.  She will most likely end up requiring a permanent pacemaker, however final decision will be made by EP team.  Continue home medications    Peripheral IV 02/23/24 20 G Left Antecubital (Active)   Site Assessment Clean;Dry;Intact 02/24/24 1200   Dressing Type Transparent 02/24/24 1200   Dressing Status Clean;Dry;Occlusive 02/24/24 1200   Number of days: 1       Venous Sheath 7 Fr. Right (Active)   Site Assessment Clean;Intact;Dry 02/24/24 1200   Line Status Intact and in place 02/24/24 1200   Dressing Occlusive 02/24/24 1200   Dressing Status Clean;Dry;Intact 02/24/24 1200   Number of days: 1       Code Status:  Full Code            Kristin Chambers MD

## 2024-02-24 NOTE — CARE PLAN
Problem: Pain - Adult  Goal: Verbalizes/displays adequate comfort level or baseline comfort level  Outcome: Progressing  Flowsheets (Taken 2/23/2024 2206 by Monica Arnold RN)  Verbalizes/displays adequate comfort level or baseline comfort level:   Encourage patient to monitor pain and request assistance   Assess pain using appropriate pain scale     Problem: Safety - Adult  Goal: Free from fall injury  Outcome: Progressing  Flowsheets (Taken 2/23/2024 2206 by Monica Arnold RN)  Free from fall injury:   Instruct family/caregiver on patient safety   Based on caregiver fall risk screen, instruct family/caregiver to ask for assistance with transferring infant if caregiver noted to have fall risk factors   The patient's goals for the shift include      The clinical goals for the shift include Pt will have increase in urine output.

## 2024-02-24 NOTE — HOSPITAL COURSE
Josee Henson is a 89 y.o. female presenting with complete heart block s/p elective outpatient TAVR with transvenous pacer in place.      Patient was noted to have significant aortic stenosis. She has had symptoms of shortness of breath and tiredness for the last two years, and her exertion when walking long distances has been about stable over the last year. She was scheduled for TAVR due to her symptoms and significant aortic stenosis. She is able to walk around her house, and will go up and down the stairs twice a week. She lives at home independently.     Additional medical history notable for HTN on valsartan/hydrochlorothiazide, HLD on atorvastatin, hypothyroidism on levothyroxine, GERD on omeprazole, and leg cramps on quinine at bedtime. She is also on aspirin daily. Additional supplements include Ca, MVI, Vit D, biotin. She takes allegra daily. She denies diabetes, lung disease, liver disease, or kidney disease though PMD note from Nov 2023 notes CKD stage 3a.      On admission to CICU, patient is complaining of neck pain at site of line insertion but otherwise no chest pain, difficulty breathing, or confusion. She was admitted with transvenous pacer set at 60bpm. On POD 0, patient briefly was able to sustain HR without pacing (with pacemaker set at 40 bpm). After 1-2 hours, patient developed heart block and HR dropped to the 40s with hypotension and decreased responsiveness. Pacer was increased to 60 bpm, she was given 1L IVF and BP and mentation improved. EKG showed RBBB.  She was maintained on TVPM and planned for PPM on 2/26.

## 2024-02-24 NOTE — CARE PLAN
Problem: Pain - Adult  Goal: Verbalizes/displays adequate comfort level or baseline comfort level  Outcome: Progressing  Flowsheets (Taken 2/23/2024 2206)  Verbalizes/displays adequate comfort level or baseline comfort level:   Encourage patient to monitor pain and request assistance   Assess pain using appropriate pain scale     Problem: Safety - Adult  Goal: Free from fall injury  Outcome: Progressing  Flowsheets (Taken 2/23/2024 2206)  Free from fall injury:   Instruct family/caregiver on patient safety   Based on caregiver fall risk screen, instruct family/caregiver to ask for assistance with transferring infant if caregiver noted to have fall risk factors     Problem: Discharge Planning  Goal: Discharge to home or other facility with appropriate resources  Outcome: Progressing   The patient's goals for the shift include      The clinical goals for the shift include pt will remain HDS throughout the shift

## 2024-02-24 NOTE — H&P (VIEW-ONLY)
Inpatient consult to Electrophysiology  Consult performed by: Shawna Escobedo MD  Consult ordered by: Aldo Villaseñor DO        History Of Present Illness:    Josee Henson is a 89 y.o. female with PMH of severe symptomatic aortic stenosis and HTN, who underwent successful DORIE with 26 mm Evolut FX complicated with intraprocedural CHB. The post-DORIE ECG demonstrated new bifascicular AVB (CRBBB and  LAFB) and EP was consulted for the further assessment.      <Cardiac Imaging Data>  12-lead ECG 2/24/2024: NSR 65 bpm,  ms, ICRBBB ( ms) and persistent LAFB (bifascicular AVB), no significant ST-T changes  12-lead ECG 2/23/2024 after DORIE: NSR 65 bpm,  ms, NO LVH but NEW CRBBB ( ms) and more profound LAFB (bifascicular AVB), no significant ST-T changes  12-lead ECG 1/2024: NSR 68 bpm,  ms, left axis deviation (LAFB? But narrow QRS 84 ms), NO LVH or BBB, no significant ST-T changes    CT TAVR 1/8/24  IMPRESSION:  1. Scattered mild atherosclerotic changes involving the  thoracoabdominal aorta and its branches. The access vessels are  patent.  2. Severe calcifications of the aortic valve correlating with history  of aortic stenosis.  3. Moderate coronary artery calcification. Correlate with coronary  artery disease risk factors.  4. Small hiatal hernia.  5. Scattered colonic diverticula with no evidence of acute  diverticulitis.  6. Other findings as described above.    Cardiac Cath 2/2/24  CONCLUSIONS:   1. 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    TTE 11/30/23  CONCLUSIONS:   1. Left ventricular systolic function is normal with a 60% estimated ejection fraction.   2. Spectral Doppler shows a pseudonormal pattern of left ventricular diastolic filling.   3. Moderate to severe aortic valve stenosis. pk/mn 50/29 mmHg, DI 0.23, ADITYA 0.6 suggests low flow low gradient severe type.   4. There is moderate to severe aortic valve cusp calcification.   5. Compared with study from  11/8/2022, dimensionless index 0.23 down from 0.34 suggesting more severe aortic stenosis. Clinical correlation suggested.     Last Recorded Vitals:  Vitals:    02/24/24 0500 02/24/24 0600 02/24/24 0633 02/24/24 0800   BP: 106/55 133/51     BP Location:       Patient Position:       Pulse: 55 52     Resp: 17 10     Temp:    35.9 °C (96.6 °F)   TempSrc:    Temporal   SpO2: 93% 98%     Weight:   72.6 kg (160 lb 0.9 oz)    Height:           Last Labs:  CBC - 2/24/2024:  3:12 AM  6.9 10.2 162    30.7      CMP - 2/24/2024:  3:12 AM  10.0 6.0 19 --- 0.5   4.2 3.5 13 73      PTT - No results in last year.  1.3   15.1 _     Hemoglobin A1C   Date/Time Value Ref Range Status   10/26/2023 09:42 AM 6.0 (H) see below % Final   06/16/2020 12:53 PM 6.4 % Final     Comment:          Diagnosis of Diabetes-Adults   Non-Diabetic: < or = 5.6%   Increased risk for developing diabetes: 5.7-6.4%   Diagnostic of diabetes: > or = 6.5%  .       Monitoring of Diabetes                Age (y)     Therapeutic Goal (%)   Adults:          >18           <7.0   Pediatrics:    13-18           <7.5                   7-12           <8.0                   0- 6            7.5-8.5   American Diabetes Association. Diabetes Care 33(S1), Jan 2010.     06/17/2019 02:06 PM 6.2 % Final     Comment:          Diagnosis of Diabetes-Adults   Non-Diabetic: < or = 5.6%   Increased risk for developing diabetes: 5.7-6.4%   Diagnostic of diabetes: > or = 6.5%  .       Monitoring of Diabetes                Age (y)     Therapeutic Goal (%)   Adults:          >18           <7.0   Pediatrics:    13-18           <7.5                   7-12           <8.0                   0- 6            7.5-8.5   American Diabetes Association. Diabetes Care 33(S1), Jan 2010.       LDL Calculated   Date/Time Value Ref Range Status   10/26/2023 09:42 AM 96 <=99 mg/dL Final     Comment:                                 Near   Borderline      AGE      Desirable  Optimal    High     High     Very  High     0-19 Y     0 - 109     ---    110-129   >/= 130     ----    20-24 Y     0 - 119     ---    120-159   >/= 160     ----      >24 Y     0 -  99   100-129  130-159   160-189     >/=190       VLDL   Date/Time Value Ref Range Status   10/26/2023 09:42 AM 27 0 - 40 mg/dL Final   10/27/2022 09:55 AM 23 0 - 40 mg/dL Final   10/14/2021 10:50 AM 24 0 - 40 mg/dL Final   10/22/2020 09:36 AM 24 0 - 40 mg/dL Final      Last I/O:  I/O last 3 completed shifts:  In: 340 (4.7 mL/kg) [P.O.:340]  Out: 10 (0.1 mL/kg) [Blood:10]  Weight: 72.6 kg     Past Cardiology Tests (Last 3 Years):  EKG:  ECG 12 lead  (Preliminary)      ECG 12 lead (Clinic Performed) 01/04/2024    Echo:  Transthoracic Echo (TTE) Limited 02/23/2024      Transthoracic Echo (TTE) Complete 11/30/2023    Ejection Fractions:  EF   Date/Time Value Ref Range Status   02/23/2024 01:40 PM 75 %    11/30/2023 11:10 AM 69       Cath:  Cardiac catheterization - coronary 02/02/2024    Stress Test:  No results found for this or any previous visit from the past 1095 days.    Cardiac Imaging:  No results found for this or any previous visit from the past 1095 days.      Past Medical History:  She has a past medical history of Aortic stenosis, Hyperlipidemia, and Hypertension.    Past Surgical History:  She has a past surgical history that includes Total knee arthroplasty (04/15/2014); Other surgical history (04/15/2014); Colonoscopy (04/15/2014); and Cardiac catheterization (N/A, 2/2/2024).      Social History:  She reports that she has never smoked. She has never used smokeless tobacco. She reports current alcohol use of about 1.0 standard drink of alcohol per week. She reports that she does not use drugs.    Family History:  Family History   Problem Relation Name Age of Onset    Alzheimer's disease Mother  83    Other (ovarian mass) Mother      Diabetes Father  69    Leukemia Father      Heart attack Father          Allergies:  Tetracycline    Inpatient  Medications:  Scheduled medications   Medication Dose Route Frequency    aspirin  81 mg oral Daily    atorvastatin  40 mg oral Nightly    enoxaparin  30 mg subcutaneous Daily    fluticasone  2 spray Each Nostril Daily    levothyroxine  125 mcg oral Daily    magnesium sulfate  2 g intravenous Once    pantoprazole  40 mg oral Daily before breakfast    perflutren lipid microspheres  0.5-10 mL of dilution intravenous Once in imaging    perflutren lipid microspheres  0.5-10 mL of dilution intravenous Once in imaging    perflutren lipid microspheres  0.5-10 mL of dilution intravenous Once in imaging    valsartan 160 mg, hydroCHLOROthiazide 12.5 mg for Diovan HCT   oral Daily     PRN medications   Medication    acetaminophen    zolpidem     Continuous Medications   Medication Dose Last Rate     Outpatient Medications:  Current Outpatient Medications   Medication Instructions    acetaminophen (TYLENOL EXTRA STRENGTH) 1,000 mg, oral, Every 8 hours PRN, As directed.    amoxicillin (Amoxil) 500 mg capsule TAKE 4 CAPSULES BY MOUTH 1 HOUR BEFORE PROCEDURE    aspirin (Ecotrin Low Strength) 81 mg EC tablet 1 tablet, oral, Nightly    atorvastatin (LIPITOR) 40 mg, oral, Daily    biotin 5 mg capsule 1 capsule, oral, Daily    calcium carbonate-vitamin D3 (Oscal-500) 500 mg-10 mcg (400 unit) tablet 2 tablets, oral, Nightly    denosumab (Prolia) 60 mg/mL syringe 1 mL, subcutaneous, Every 6 months    fexofenadine (ALLEGRA ALLERGY) 180 mg, oral, Daily    flaxseed oiL 1,000 mg capsule 1 capsule, oral, 2 times daily    fluticasone (Flonase) 50 mcg/actuation nasal spray 2 sprays, Each Nostril, Daily    levothyroxine (Synthroid, Levoxyl) 125 mcg tablet 1 tablet, oral, Daily    multivitamin with minerals tablet 1 tablet, oral, Daily    omeprazole (PriLOSEC) 20 mg DR capsule 1 capsule, oral, Daily    omeprazole magnesium (PRILOSEC ORAL) 20 mg, oral, Daily    quiNINE (Qualaquin) 324 mg capsule 1 capsule, oral, Nightly, For leg cramps     valsartan-hydrochlorothiazide (Diovan-HCT) 160-12.5 mg tablet 1 tablet, oral, Daily    zolpidem (AMBIEN) 5 mg, oral, Nightly PRN       Physical Exam:  GA: not in acute distress, AAO  x3  CV: no JVD, normal S1/S2, no significant heart murmurs, CTAB  Ext: no significant pretibial edema bilaterally     Assessment/Plan   Josee Henson is a 89 y.o. female with PMH of severe symptomatic aortic stenosis and HTN, who underwent successful DORIE with 26 mm Evolut FX complicated with intraprocedural CHB. The post-DORIE ECG demonstrated NSR  with new bifascicular AVB (CRBBB and  LAFB) and EP was consulted for the further assessment.     Impression:  - Post-DORIE transient complete heart block and sustained new bifascicular AVB (RBBB and LAFB)  - Given the CRBBB was gradually improved to ICRBBB today, the RBBB can be transient.    Preliminary Recommendations (not discussed with EP attending yet):  - We will need close observation this weekend with back-up TVP and will finalize the plan for dcPPM implantation vs. discharginig home with extended rhythm monitor only.  - avoid any kinds of AVN blocking agents    This case was discussed with Dr. Franco (EP attending).    Shawna Escobedo MD  Clinical Cardiac Electrophysiology Fellow    Peripheral IV 02/23/24 20 G Left Antecubital (Active)   Site Assessment Clean;Dry;Intact 02/24/24 0845   Dressing Status Clean;Dry;Occlusive 02/24/24 0845   Number of days: 1       Venous Sheath 7 Fr. Right (Active)   Site Assessment Clean;Dry;Intact 02/24/24 0846   Number of days: 1       Code Status:  Full Code    I spent 30 minutes in the professional and overall care of this patient.        Shawna Escobedo MD

## 2024-02-24 NOTE — POST-PROCEDURE NOTE
Physician Transition of Care Summary  Invasive Cardiovascular Lab    Procedure Date: 2/23/2024  Attending: Panel 1:     * Braydon Correa - Primary  Panel 2:     * Cody Khanna - Primary  Resident/Fellow/Other Assistant: Surgeon(s) and Role:  Panel 1:     * Kristin Chambers MD - Fellow  Panel 2:     * Brandi Serrano MD - Assisting    Indications:   Pre-op Diagnosis     * Nonrheumatic aortic valve stenosis [I35.0]    Post-procedure diagnosis:   Post-op Diagnosis     * Nonrheumatic aortic valve stenosis [I35.0]    Procedure(s):   TVP for TAVR    TAVR-OR    TAVR (Transcatheter AV Replacement)  74752 - CO REPLACE AORTIC VALVE PERQ FEMORAL ARTRY APPROACH    CO REPLACE AORTIC VALVE PERQ FEMORAL ARTRY APPROACH [26024]    Description of the Procedure:   Indication: Severe Aortic Stenosis    Valve used: 26 mm Evolut FX    Access  Primary: right CFA s/p 2 proglide  Secondary: left CFA 6 [place] s/p 1 proglide    TVP: right IJ vein, 7 [place] --> left in place due to CHB      Post gradient TTE: 3  Trace PVL  No effusion    Conclusion  Keep in ICU due to CHB  Monitor access sites for bleeding  TTE tomorrow  Bed rest  Structural team will continue to follow.   page structural team for any concerning clinical events.          Estimated Blood Loss:   10 mL    Anesthesia: Moderate Sedation Anesthesia Staff: No anesthesia staff entered.    Any Specimen(s) Removed:   No specimens collected during this procedure.      Electronically signed by: Kristin Chambers MD, 2/23/2024 7:32 PM

## 2024-02-24 NOTE — NURSING NOTE
0350 gave bedside report to upcoming nurse, Whitney.   Pt was lying down in bed, with call light within reach and bed at lowest position.

## 2024-02-25 LAB
ALBUMIN SERPL BCP-MCNC: 3.2 G/DL (ref 3.4–5)
ANION GAP SERPL CALC-SCNC: 11 MMOL/L (ref 10–20)
BASOPHILS # BLD AUTO: 0.03 X10*3/UL (ref 0–0.1)
BASOPHILS NFR BLD AUTO: 0.5 %
BUN SERPL-MCNC: 40 MG/DL (ref 6–23)
CALCIUM SERPL-MCNC: 9.6 MG/DL (ref 8.6–10.6)
CHLORIDE SERPL-SCNC: 105 MMOL/L (ref 98–107)
CO2 SERPL-SCNC: 28 MMOL/L (ref 21–32)
CREAT SERPL-MCNC: 1.62 MG/DL (ref 0.5–1.05)
EGFRCR SERPLBLD CKD-EPI 2021: 30 ML/MIN/1.73M*2
EOSINOPHIL # BLD AUTO: 0.24 X10*3/UL (ref 0–0.4)
EOSINOPHIL NFR BLD AUTO: 3.7 %
ERYTHROCYTE [DISTWIDTH] IN BLOOD BY AUTOMATED COUNT: 14.4 % (ref 11.5–14.5)
GLUCOSE SERPL-MCNC: 117 MG/DL (ref 74–99)
HCT VFR BLD AUTO: 29.7 % (ref 36–46)
HGB BLD-MCNC: 9.9 G/DL (ref 12–16)
IMM GRANULOCYTES # BLD AUTO: 0.05 X10*3/UL (ref 0–0.5)
IMM GRANULOCYTES NFR BLD AUTO: 0.8 % (ref 0–0.9)
LYMPHOCYTES # BLD AUTO: 1.17 X10*3/UL (ref 0.8–3)
LYMPHOCYTES NFR BLD AUTO: 17.8 %
MAGNESIUM SERPL-MCNC: 2.19 MG/DL (ref 1.6–2.4)
MCH RBC QN AUTO: 28.5 PG (ref 26–34)
MCHC RBC AUTO-ENTMCNC: 33.3 G/DL (ref 32–36)
MCV RBC AUTO: 86 FL (ref 80–100)
MONOCYTES # BLD AUTO: 0.51 X10*3/UL (ref 0.05–0.8)
MONOCYTES NFR BLD AUTO: 7.8 %
NEUTROPHILS # BLD AUTO: 4.56 X10*3/UL (ref 1.6–5.5)
NEUTROPHILS NFR BLD AUTO: 69.4 %
NRBC BLD-RTO: 0 /100 WBCS (ref 0–0)
PHOSPHATE SERPL-MCNC: 3.5 MG/DL (ref 2.5–4.9)
PLATELET # BLD AUTO: 129 X10*3/UL (ref 150–450)
POTASSIUM SERPL-SCNC: 4.3 MMOL/L (ref 3.5–5.3)
RBC # BLD AUTO: 3.47 X10*6/UL (ref 4–5.2)
SODIUM SERPL-SCNC: 140 MMOL/L (ref 136–145)
WBC # BLD AUTO: 6.6 X10*3/UL (ref 4.4–11.3)

## 2024-02-25 PROCEDURE — 2500000001 HC RX 250 WO HCPCS SELF ADMINISTERED DRUGS (ALT 637 FOR MEDICARE OP)

## 2024-02-25 PROCEDURE — 83735 ASSAY OF MAGNESIUM: CPT | Performed by: STUDENT IN AN ORGANIZED HEALTH CARE EDUCATION/TRAINING PROGRAM

## 2024-02-25 PROCEDURE — 99233 SBSQ HOSP IP/OBS HIGH 50: CPT

## 2024-02-25 PROCEDURE — 2500000004 HC RX 250 GENERAL PHARMACY W/ HCPCS (ALT 636 FOR OP/ED)

## 2024-02-25 PROCEDURE — 2500000002 HC RX 250 W HCPCS SELF ADMINISTERED DRUGS (ALT 637 FOR MEDICARE OP, ALT 636 FOR OP/ED): Performed by: NURSE PRACTITIONER

## 2024-02-25 PROCEDURE — 1100000001 HC PRIVATE ROOM DAILY

## 2024-02-25 PROCEDURE — 80069 RENAL FUNCTION PANEL: CPT | Performed by: STUDENT IN AN ORGANIZED HEALTH CARE EDUCATION/TRAINING PROGRAM

## 2024-02-25 PROCEDURE — 2500000002 HC RX 250 W HCPCS SELF ADMINISTERED DRUGS (ALT 637 FOR MEDICARE OP, ALT 636 FOR OP/ED): Mod: MUE

## 2024-02-25 PROCEDURE — 2500000001 HC RX 250 WO HCPCS SELF ADMINISTERED DRUGS (ALT 637 FOR MEDICARE OP): Performed by: NURSE PRACTITIONER

## 2024-02-25 PROCEDURE — 85025 COMPLETE CBC W/AUTO DIFF WBC: CPT | Performed by: STUDENT IN AN ORGANIZED HEALTH CARE EDUCATION/TRAINING PROGRAM

## 2024-02-25 PROCEDURE — 37799 UNLISTED PX VASCULAR SURGERY: CPT | Performed by: STUDENT IN AN ORGANIZED HEALTH CARE EDUCATION/TRAINING PROGRAM

## 2024-02-25 RX ORDER — VALSARTAN 80 MG/1
80 TABLET ORAL DAILY
Status: DISCONTINUED | OUTPATIENT
Start: 2024-02-25 | End: 2024-02-27 | Stop reason: HOSPADM

## 2024-02-25 RX ADMIN — ASPIRIN 81 MG 81 MG: 81 TABLET ORAL at 09:10

## 2024-02-25 RX ADMIN — LEVOTHYROXINE SODIUM 125 MCG: 125 TABLET ORAL at 09:11

## 2024-02-25 RX ADMIN — FLUTICASONE PROPIONATE 2 SPRAY: 50 SPRAY, METERED NASAL at 09:10

## 2024-02-25 RX ADMIN — PANTOPRAZOLE SODIUM 40 MG: 40 TABLET, DELAYED RELEASE ORAL at 05:37

## 2024-02-25 RX ADMIN — VALSARTAN 80 MG: 160 TABLET, FILM COATED ORAL at 09:11

## 2024-02-25 RX ADMIN — ATORVASTATIN CALCIUM 40 MG: 40 TABLET, FILM COATED ORAL at 21:28

## 2024-02-25 RX ADMIN — ENOXAPARIN SODIUM 30 MG: 100 INJECTION SUBCUTANEOUS at 09:11

## 2024-02-25 ASSESSMENT — PAIN - FUNCTIONAL ASSESSMENT: PAIN_FUNCTIONAL_ASSESSMENT: 0-10

## 2024-02-25 ASSESSMENT — PAIN SCALES - GENERAL: PAINLEVEL_OUTOF10: 0 - NO PAIN

## 2024-02-25 NOTE — PROGRESS NOTES
Subjective Data:  Patient denied acute cardiopulmonary symptoms this morning.    Overnight Events:    Patient developed 100% RV pacing dependency around 6-7pm last night.     Objective Data:  Last Recorded Vitals:  Vitals:    02/25/24 0100 02/25/24 0200 02/25/24 0300 02/25/24 0809   BP: (!) 93/43 (!) 109/46 121/52    Pulse: 50 50 50    Resp: 22 19 15    Temp:    36 °C (96.8 °F)   TempSrc:    Temporal   SpO2: 93% 92% 94%    Weight:       Height:           Last Labs:  CBC - 2/25/2024:  5:37 AM  6.6 9.9 129    29.7      CMP - 2/25/2024:  5:37 AM  9.6 6.0 19 --- 0.5   3.5 3.2 13 73      PTT - No results in last year.  1.3   15.1 _     HGBA1C   Date/Time Value Ref Range Status   10/26/2023 09:42 AM 6.0 see below % Final   06/16/2020 12:53 PM 6.4 % Final     Comment:          Diagnosis of Diabetes-Adults   Non-Diabetic: < or = 5.6%   Increased risk for developing diabetes: 5.7-6.4%   Diagnostic of diabetes: > or = 6.5%  .       Monitoring of Diabetes                Age (y)     Therapeutic Goal (%)   Adults:          >18           <7.0   Pediatrics:    13-18           <7.5                   7-12           <8.0                   0- 6            7.5-8.5   American Diabetes Association. Diabetes Care 33(S1), Jan 2010.     06/17/2019 02:06 PM 6.2 % Final     Comment:          Diagnosis of Diabetes-Adults   Non-Diabetic: < or = 5.6%   Increased risk for developing diabetes: 5.7-6.4%   Diagnostic of diabetes: > or = 6.5%  .       Monitoring of Diabetes                Age (y)     Therapeutic Goal (%)   Adults:          >18           <7.0   Pediatrics:    13-18           <7.5                   7-12           <8.0                   0- 6            7.5-8.5   American Diabetes Association. Diabetes Care 33(S1), Jan 2010.       LDLCALC   Date/Time Value Ref Range Status   10/26/2023 09:42 AM 96 <=99 mg/dL Final     Comment:                                 Near   Borderline      AGE      Desirable  Optimal    High     High     Very  High     0-19 Y     0 - 109     ---    110-129   >/= 130     ----    20-24 Y     0 - 119     ---    120-159   >/= 160     ----      >24 Y     0 -  99   100-129  130-159   160-189     >/=190       VLDL   Date/Time Value Ref Range Status   10/26/2023 09:42 AM 27 0 - 40 mg/dL Final   10/27/2022 09:55 AM 23 0 - 40 mg/dL Final   10/14/2021 10:50 AM 24 0 - 40 mg/dL Final   10/22/2020 09:36 AM 24 0 - 40 mg/dL Final      Last I/O:  I/O last 3 completed shifts:  In: 790 (10.9 mL/kg) [P.O.:740; I.V.:50 (0.7 mL/kg)]  Out: 250 (3.4 mL/kg) [Urine:250 (0.1 mL/kg/hr)]  Weight: 72.6 kg     Past Cardiology Tests (Last 3 Years):  EKG:  ECG 12 lead  (Preliminary)      ECG 12 lead (Clinic Performed) 01/04/2024    Echo:  Transthoracic Echo (TTE) Limited 02/24/2024      Transthoracic Echo (TTE) Limited 02/23/2024      Transthoracic Echo (TTE) Complete 11/30/2023    Ejection Fractions:  EF   Date/Time Value Ref Range Status   02/24/2024 11:22 AM 75 %    02/23/2024 01:40 PM 75 %    11/30/2023 11:10 AM 69       Cath:  Cardiac catheterization - coronary 02/02/2024    Stress Test:  No results found for this or any previous visit from the past 1095 days.    Cardiac Imaging:  No results found for this or any previous visit from the past 1095 days.      Inpatient Medications:  Scheduled medications   Medication Dose Route Frequency    aspirin  81 mg oral Daily    atorvastatin  40 mg oral Nightly    enoxaparin  30 mg subcutaneous Daily    fluticasone  2 spray Each Nostril Daily    levothyroxine  125 mcg oral Daily    pantoprazole  40 mg oral Daily before breakfast    perflutren lipid microspheres  0.5-10 mL of dilution intravenous Once in imaging    perflutren lipid microspheres  0.5-10 mL of dilution intravenous Once in imaging    valsartan  80 mg oral Daily     PRN medications   Medication    acetaminophen    zolpidem     Continuous Medications   Medication Dose Last Rate       Physical Exam:  GA: not in acute distress, AAO  x3  CV: no JVD,  normal S1/S2, no significant heart murmurs, CTAB  Ext: no significant pretibial edema bilaterally     Assessment/Plan   Josee Henson is a 89 y.o. female with PMH of severe symptomatic aortic stenosis and HTN, who underwent successful DORIE with 26 mm Evolut FX complicated with intraprocedural CHB. The post-DORIE ECG demonstrated NSR  with new bifascicular AVB (CRBBB and  LAFB) and EP was consulted for the further assessment.   2/24 PM: Patient developed recurrent CHB with preserved NSR 75-80 bpm.      Impression:  - Post-DORIE transient complete heart block and sustained new bifascicular AVB (RBBB and LAFB) -> Now recurrent CHB again  - Preserved LVEF 70-75% with normal DORIE valve function on TTE 2/24/2024     Preliminary Recommendations (not discussed with EP attending yet):  - Tentatively, EP will plan dcPPM with LBBAP tomorrow as inpatient add-on. Please place NPO MN and hold heparin products tonight.   - avoid any kinds of AVN blocking agents     This case was discussed with Dr. Franco (EP attending).     Shawna Escobedo MD  Clinical Cardiac Electrophysiology Fellow    Peripheral IV 02/23/24 20 G Left Antecubital (Active)   Site Assessment Clean;Dry;Intact 02/24/24 2023   Dressing Status Clean;Dry;Occlusive 02/24/24 2023   Number of days: 2       Venous Sheath 7 Fr. Right (Active)   Site Assessment Clean;Dry;Intact 02/24/24 2024   Number of days: 2       Code Status:  Full Code    I spent 20 minutes in the professional and overall care of this patient.        Shawna Escobedo MD

## 2024-02-25 NOTE — PROGRESS NOTES
"Josee Henson is a 89 y.o. female on day 2 of admission presenting with Aortic stenosis.    Subjective   Patient was requiring pacer beats with HR 50 since 1900 on 2/24. Feeling well this am with no complaints.        Objective     Physical Exam  General: Awake, alert, in no acute distress  HEENT: Normocephalic, atraumatic, R transvenous pacer in place, c/d/i  CV: Heart bradycardic paced at 50 bpm, 1/6 systolic murmur at LUSB  Lungs: Clear to auscultation bilaterally with no wheezes, crackles, or rhonchi  GI: Soft, nontender, nondistended  Extremities: 2+ pulses bilaterally, no lower extremity edema  Neuro: Moves all extremities equally, strength 5/5 bilaterally, AOx3    Last Recorded Vitals  Blood pressure 121/52, pulse 50, temperature 36.7 °C (98.1 °F), temperature source Temporal, resp. rate 15, height 1.549 m (5' 0.98\"), weight 72.6 kg (160 lb 0.9 oz), SpO2 94 %.  Intake/Output last 3 Shifts:  I/O last 3 completed shifts:  In: 790 (10.9 mL/kg) [P.O.:740; I.V.:50 (0.7 mL/kg)]  Out: 250 (3.4 mL/kg) [Urine:250 (0.1 mL/kg/hr)]  Weight: 72.6 kg     Relevant Results  Scheduled medications  aspirin, 81 mg, oral, Daily  atorvastatin, 40 mg, oral, Nightly  enoxaparin, 30 mg, subcutaneous, Daily  fluticasone, 2 spray, Each Nostril, Daily  levothyroxine, 125 mcg, oral, Daily  pantoprazole, 40 mg, oral, Daily before breakfast  perflutren lipid microspheres, 0.5-10 mL of dilution, intravenous, Once in imaging  perflutren lipid microspheres, 0.5-10 mL of dilution, intravenous, Once in imaging  valsartan, 80 mg, oral, Daily      Continuous medications     PRN medications  PRN medications: acetaminophen, zolpidem     Results for orders placed or performed during the hospital encounter of 02/23/24 (from the past 24 hour(s))   Transthoracic Echo (TTE) Limited   Result Value Ref Range    AV pk jesusita 2.03 m/s    AV mn grad 8.0 mmHg    LVOT diam 1.80 cm    LV biplane EF 75 %    MV avg E/e' ratio 9.55     MV E/A ratio 0.72     " Tricuspid annular plane systolic excursion 1.8 cm    RV free wall pk S' 16.20 cm/s    LVIDd 5.00 cm    Aortic Valve Area by Continuity of VTI 1.81 cm2    Aortic Valve Area by Continuity of Peak Velocity 1.62 cm2    AV pk grad 16.5 mmHg    LV A4C EF 77.8    Magnesium   Result Value Ref Range    Magnesium 2.19 1.60 - 2.40 mg/dL   CBC and Auto Differential   Result Value Ref Range    WBC 6.6 4.4 - 11.3 x10*3/uL    nRBC 0.0 0.0 - 0.0 /100 WBCs    RBC 3.47 (L) 4.00 - 5.20 x10*6/uL    Hemoglobin 9.9 (L) 12.0 - 16.0 g/dL    Hematocrit 29.7 (L) 36.0 - 46.0 %    MCV 86 80 - 100 fL    MCH 28.5 26.0 - 34.0 pg    MCHC 33.3 32.0 - 36.0 g/dL    RDW 14.4 11.5 - 14.5 %    Platelets 129 (L) 150 - 450 x10*3/uL    Neutrophils % 69.4 40.0 - 80.0 %    Immature Granulocytes %, Automated 0.8 0.0 - 0.9 %    Lymphocytes % 17.8 13.0 - 44.0 %    Monocytes % 7.8 2.0 - 10.0 %    Eosinophils % 3.7 0.0 - 6.0 %    Basophils % 0.5 0.0 - 2.0 %    Neutrophils Absolute 4.56 1.60 - 5.50 x10*3/uL    Immature Granulocytes Absolute, Automated 0.05 0.00 - 0.50 x10*3/uL    Lymphocytes Absolute 1.17 0.80 - 3.00 x10*3/uL    Monocytes Absolute 0.51 0.05 - 0.80 x10*3/uL    Eosinophils Absolute 0.24 0.00 - 0.40 x10*3/uL    Basophils Absolute 0.03 0.00 - 0.10 x10*3/uL   Renal function panel   Result Value Ref Range    Glucose 117 (H) 74 - 99 mg/dL    Sodium 140 136 - 145 mmol/L    Potassium 4.3 3.5 - 5.3 mmol/L    Chloride 105 98 - 107 mmol/L    Bicarbonate 28 21 - 32 mmol/L    Anion Gap 11 10 - 20 mmol/L    Urea Nitrogen 40 (H) 6 - 23 mg/dL    Creatinine 1.62 (H) 0.50 - 1.05 mg/dL    eGFR 30 (L) >60 mL/min/1.73m*2    Calcium 9.6 8.6 - 10.6 mg/dL    Phosphorus 3.5 2.5 - 4.9 mg/dL    Albumin 3.2 (L) 3.4 - 5.0 g/dL          Assessment/Plan   Principal Problem:    Aortic stenosis  Active Problems:    Chronic hyperglycemia    Gastroesophageal reflux disease    Hyperlipidemia    Hypertension    Hypothyroidism    Nocturnal leg cramps    Chronic renal impairment, stage  3a (CMS/[facility])    Nonrheumatic aortic valve stenosis    S/P TAVR (transcatheter aortic valve replacement)    Complete heart block, post-surgical (CMS/[facility])    Presence of temporary transvenous cardiac pacemaker    Right bundle branch block (RBBB)    88 yo female admitted to CICU for complete heart block s/p TAVR for moderate to severe aortic stenosis. Patient is hemodynamically stable with transvenous pacer in place. Additional history includes hypertension, hyperlipidemia, hypothyroidism, GERD, and multiple joint replacements. On POD 0, patient was able to tolerate several hours of native HR without pacing, however she later developed hypotension and bradycardia and required pacing again to the 60s with improvement in BP and received 1L IV fluids. EKG demonstrating RBBB. Plan to complete post-TAVR workup tomorrow with guidance from structural heart team.    Updates 2/25:  - Lower Bps overnight, change valsartan/hydrochlorothiazide 160/12.5 to valsartan 80mg  - Currently requiring pacer at 50 bpm since 1900 on 2/25  - EP consulted for likely pacemaker   - Patient's EKG showing RBBB  - post-TAVR monitoring per structural heart    NEURO  No acute issues     CV  #Moderate to severe aortic stenosis  #s/pTAVR  : : EF 60% 11/2023  - CBC post-procedure    #complete heart block s/p TAVR  - Transvenous pacer in place, HR set at 60bpm  - Monitor on telemetry  - EP following for pacemaker    #low BP  #HTN  - Start valsartan at 80 mg (1/2 of home dose)  - HOLD home valsartan/hydrochlorothiazide    #HLD  - Continue home atorvastatin    Pulm  Stable on room air    Endo  #hypothyroidism  - Continue home levothyroxine    Renal  Chart diagnosis of CKD  - Creatinine stable at 1.49 (baseline 1.5-1.6)  - Follow up RFP, Mg post procedure    F: none  E: replete for K>4, Mg >2  N: regular diet  G: continue home omeprazole  DVT ppx: lovenox 30mg, SCDs    Code Discussion: Full Code, if clinical change and coding would have minimal chance of  recovery, children would re-discuss  HCPOA completed, children (Narayan and Negrita) are decision makers. Phone: Narayan [phone] [place] [phone] (home)    Patient was seen and discussed with attending Dr. Charleen Almeida MD  Internal Medicine and Pediatrics, PGY2

## 2024-02-26 ENCOUNTER — APPOINTMENT (OUTPATIENT)
Dept: RADIOLOGY | Facility: HOSPITAL | Age: 89
DRG: 267 | End: 2024-02-26
Payer: MEDICARE

## 2024-02-26 LAB
ACT BLD: 351 SEC (ref 89–169)
ALBUMIN SERPL BCP-MCNC: 3.1 G/DL (ref 3.4–5)
ANION GAP SERPL CALC-SCNC: 13 MMOL/L (ref 10–20)
APTT PPP: 32 SECONDS (ref 27–38)
ATRIAL RATE: 59 BPM
ATRIAL RATE: 63 BPM
ATRIAL RATE: 65 BPM
BUN SERPL-MCNC: 45 MG/DL (ref 6–23)
CALCIUM SERPL-MCNC: 9.4 MG/DL (ref 8.6–10.6)
CHLORIDE SERPL-SCNC: 105 MMOL/L (ref 98–107)
CO2 SERPL-SCNC: 27 MMOL/L (ref 21–32)
CREAT SERPL-MCNC: 1.66 MG/DL (ref 0.5–1.05)
EGFRCR SERPLBLD CKD-EPI 2021: 29 ML/MIN/1.73M*2
ERYTHROCYTE [DISTWIDTH] IN BLOOD BY AUTOMATED COUNT: 14.8 % (ref 11.5–14.5)
GLUCOSE SERPL-MCNC: 123 MG/DL (ref 74–99)
HCT VFR BLD AUTO: 30.7 % (ref 36–46)
HGB BLD-MCNC: 9.8 G/DL (ref 12–16)
INR PPP: 1 (ref 0.9–1.1)
MAGNESIUM SERPL-MCNC: 1.96 MG/DL (ref 1.6–2.4)
MCH RBC QN AUTO: 29 PG (ref 26–34)
MCHC RBC AUTO-ENTMCNC: 31.9 G/DL (ref 32–36)
MCV RBC AUTO: 91 FL (ref 80–100)
NRBC BLD-RTO: 0 /100 WBCS (ref 0–0)
P AXIS: 16 DEGREES
P AXIS: 3 DEGREES
P AXIS: 40 DEGREES
P OFFSET: 192 MS
P OFFSET: 202 MS
P OFFSET: 204 MS
P ONSET: 138 MS
P ONSET: 149 MS
P ONSET: 157 MS
PHOSPHATE SERPL-MCNC: 3.3 MG/DL (ref 2.5–4.9)
PLATELET # BLD AUTO: 126 X10*3/UL (ref 150–450)
POTASSIUM SERPL-SCNC: 4.2 MMOL/L (ref 3.5–5.3)
PR INTERVAL: 136 MS
PR INTERVAL: 150 MS
PR INTERVAL: 152 MS
PROTHROMBIN TIME: 10.8 SECONDS (ref 9.8–12.8)
Q ONSET: 214 MS
Q ONSET: 224 MS
Q ONSET: 225 MS
QRS COUNT: 10 BEATS
QRS DURATION: 104 MS
QRS DURATION: 126 MS
QRS DURATION: 90 MS
QT INTERVAL: 424 MS
QT INTERVAL: 438 MS
QT INTERVAL: 472 MS
QTC CALCULATION(BAZETT): 433 MS
QTC CALCULATION(BAZETT): 433 MS
QTC CALCULATION(BAZETT): 490 MS
QTC FREDERICIA: 431 MS
QTC FREDERICIA: 435 MS
QTC FREDERICIA: 484 MS
R AXIS: -19 DEGREES
R AXIS: -21 DEGREES
R AXIS: -29 DEGREES
RBC # BLD AUTO: 3.38 X10*6/UL (ref 4–5.2)
SODIUM SERPL-SCNC: 141 MMOL/L (ref 136–145)
T AXIS: -30 DEGREES
T AXIS: -8 DEGREES
T AXIS: 36 DEGREES
T OFFSET: 437 MS
T OFFSET: 443 MS
T OFFSET: 450 MS
VENTRICULAR RATE: 59 BPM
VENTRICULAR RATE: 63 BPM
VENTRICULAR RATE: 65 BPM
WBC # BLD AUTO: 7.3 X10*3/UL (ref 4.4–11.3)

## 2024-02-26 PROCEDURE — C1781 MESH (IMPLANTABLE): HCPCS | Performed by: INTERNAL MEDICINE

## 2024-02-26 PROCEDURE — 2500000002 HC RX 250 W HCPCS SELF ADMINISTERED DRUGS (ALT 637 FOR MEDICARE OP, ALT 636 FOR OP/ED)

## 2024-02-26 PROCEDURE — 71045 X-RAY EXAM CHEST 1 VIEW: CPT | Performed by: RADIOLOGY

## 2024-02-26 PROCEDURE — 97161 PT EVAL LOW COMPLEX 20 MIN: CPT | Mod: GP

## 2024-02-26 PROCEDURE — 2500000004 HC RX 250 GENERAL PHARMACY W/ HCPCS (ALT 636 FOR OP/ED): Performed by: STUDENT IN AN ORGANIZED HEALTH CARE EDUCATION/TRAINING PROGRAM

## 2024-02-26 PROCEDURE — C1892 INTRO/SHEATH,FIXED,PEEL-AWAY: HCPCS | Performed by: INTERNAL MEDICINE

## 2024-02-26 PROCEDURE — 2780000003 HC OR 278 NO HCPCS: Performed by: INTERNAL MEDICINE

## 2024-02-26 PROCEDURE — 37799 UNLISTED PX VASCULAR SURGERY: CPT | Performed by: STUDENT IN AN ORGANIZED HEALTH CARE EDUCATION/TRAINING PROGRAM

## 2024-02-26 PROCEDURE — 33208 INSRT HEART PM ATRIAL & VENT: CPT | Performed by: INTERNAL MEDICINE

## 2024-02-26 PROCEDURE — 99153 MOD SED SAME PHYS/QHP EA: CPT | Performed by: INTERNAL MEDICINE

## 2024-02-26 PROCEDURE — 2500000001 HC RX 250 WO HCPCS SELF ADMINISTERED DRUGS (ALT 637 FOR MEDICARE OP): Performed by: STUDENT IN AN ORGANIZED HEALTH CARE EDUCATION/TRAINING PROGRAM

## 2024-02-26 PROCEDURE — 85027 COMPLETE CBC AUTOMATED: CPT | Performed by: STUDENT IN AN ORGANIZED HEALTH CARE EDUCATION/TRAINING PROGRAM

## 2024-02-26 PROCEDURE — 2500000004 HC RX 250 GENERAL PHARMACY W/ HCPCS (ALT 636 FOR OP/ED): Performed by: INTERNAL MEDICINE

## 2024-02-26 PROCEDURE — 99291 CRITICAL CARE FIRST HOUR: CPT | Performed by: STUDENT IN AN ORGANIZED HEALTH CARE EDUCATION/TRAINING PROGRAM

## 2024-02-26 PROCEDURE — 2720000007 HC OR 272 NO HCPCS: Performed by: INTERNAL MEDICINE

## 2024-02-26 PROCEDURE — 02HK3JZ INSERTION OF PACEMAKER LEAD INTO RIGHT VENTRICLE, PERCUTANEOUS APPROACH: ICD-10-PCS | Performed by: INTERNAL MEDICINE

## 2024-02-26 PROCEDURE — 83735 ASSAY OF MAGNESIUM: CPT | Performed by: STUDENT IN AN ORGANIZED HEALTH CARE EDUCATION/TRAINING PROGRAM

## 2024-02-26 PROCEDURE — 99232 SBSQ HOSP IP/OBS MODERATE 35: CPT | Performed by: NURSE PRACTITIONER

## 2024-02-26 PROCEDURE — 0JH606Z INSERTION OF PACEMAKER, DUAL CHAMBER INTO CHEST SUBCUTANEOUS TISSUE AND FASCIA, OPEN APPROACH: ICD-10-PCS | Performed by: INTERNAL MEDICINE

## 2024-02-26 PROCEDURE — 2500000001 HC RX 250 WO HCPCS SELF ADMINISTERED DRUGS (ALT 637 FOR MEDICARE OP): Performed by: NURSE PRACTITIONER

## 2024-02-26 PROCEDURE — 2020000001 HC ICU ROOM DAILY

## 2024-02-26 PROCEDURE — 84100 ASSAY OF PHOSPHORUS: CPT

## 2024-02-26 PROCEDURE — 2750000001 HC OR 275 NO HCPCS: Performed by: INTERNAL MEDICINE

## 2024-02-26 PROCEDURE — 99152 MOD SED SAME PHYS/QHP 5/>YRS: CPT | Performed by: INTERNAL MEDICINE

## 2024-02-26 PROCEDURE — 2500000001 HC RX 250 WO HCPCS SELF ADMINISTERED DRUGS (ALT 637 FOR MEDICARE OP)

## 2024-02-26 PROCEDURE — C1898 LEAD, PMKR, OTHER THAN TRANS: HCPCS | Performed by: INTERNAL MEDICINE

## 2024-02-26 PROCEDURE — 85610 PROTHROMBIN TIME: CPT | Performed by: STUDENT IN AN ORGANIZED HEALTH CARE EDUCATION/TRAINING PROGRAM

## 2024-02-26 PROCEDURE — 2550000001 HC RX 255 CONTRASTS: Performed by: INTERNAL MEDICINE

## 2024-02-26 PROCEDURE — 71045 X-RAY EXAM CHEST 1 VIEW: CPT

## 2024-02-26 PROCEDURE — C1785 PMKR, DUAL, RATE-RESP: HCPCS | Performed by: INTERNAL MEDICINE

## 2024-02-26 PROCEDURE — 02H63JZ INSERTION OF PACEMAKER LEAD INTO RIGHT ATRIUM, PERCUTANEOUS APPROACH: ICD-10-PCS | Performed by: INTERNAL MEDICINE

## 2024-02-26 DEVICE — LEAD 5076-45 CAPSUREFIX NOVUS US EN
Type: IMPLANTABLE DEVICE | Site: HEART | Status: FUNCTIONAL
Brand: CAPSUREFIX® NOVUS

## 2024-02-26 DEVICE — LEAD 5076-52 CAPSUREFIX NOVUS US EN
Type: IMPLANTABLE DEVICE | Site: HEART | Status: FUNCTIONAL
Brand: CAPSUREFIX® NOVUS

## 2024-02-26 DEVICE — IPG W1DR01 AZURE XT DR MRI WL USA BCP
Type: IMPLANTABLE DEVICE | Site: CHEST  WALL | Status: FUNCTIONAL
Brand: AZURE™ XT DR MRI SURESCAN™

## 2024-02-26 RX ORDER — ONDANSETRON HYDROCHLORIDE 2 MG/ML
INJECTION, SOLUTION INTRAVENOUS AS NEEDED
Status: DISCONTINUED | OUTPATIENT
Start: 2024-02-26 | End: 2024-02-26 | Stop reason: HOSPADM

## 2024-02-26 RX ORDER — BUPIVACAINE HYDROCHLORIDE 5 MG/ML
INJECTION, SOLUTION EPIDURAL; INTRACAUDAL AS NEEDED
Status: DISCONTINUED | OUTPATIENT
Start: 2024-02-26 | End: 2024-02-26 | Stop reason: HOSPADM

## 2024-02-26 RX ORDER — FENTANYL CITRATE 50 UG/ML
INJECTION, SOLUTION INTRAMUSCULAR; INTRAVENOUS AS NEEDED
Status: DISCONTINUED | OUTPATIENT
Start: 2024-02-26 | End: 2024-02-26 | Stop reason: HOSPADM

## 2024-02-26 RX ORDER — CEFAZOLIN 1 G/1
INJECTION, POWDER, FOR SOLUTION INTRAVENOUS AS NEEDED
Status: DISCONTINUED | OUTPATIENT
Start: 2024-02-26 | End: 2024-02-26 | Stop reason: HOSPADM

## 2024-02-26 RX ORDER — VANCOMYCIN HYDROCHLORIDE 1 G/20ML
INJECTION, POWDER, LYOPHILIZED, FOR SOLUTION INTRAVENOUS AS NEEDED
Status: DISCONTINUED | OUTPATIENT
Start: 2024-02-26 | End: 2024-02-26 | Stop reason: HOSPADM

## 2024-02-26 RX ORDER — VANCOMYCIN HYDROCHLORIDE 500 MG/100ML
INJECTION, SOLUTION INTRAVENOUS CONTINUOUS PRN
Status: COMPLETED | OUTPATIENT
Start: 2024-02-26 | End: 2024-02-26

## 2024-02-26 RX ORDER — SODIUM CHLORIDE 9 MG/ML
INJECTION, SOLUTION INTRAVENOUS CONTINUOUS PRN
Status: COMPLETED | OUTPATIENT
Start: 2024-02-26 | End: 2024-02-26

## 2024-02-26 RX ORDER — MIDAZOLAM HYDROCHLORIDE 1 MG/ML
INJECTION, SOLUTION INTRAMUSCULAR; INTRAVENOUS AS NEEDED
Status: DISCONTINUED | OUTPATIENT
Start: 2024-02-26 | End: 2024-02-26 | Stop reason: HOSPADM

## 2024-02-26 RX ADMIN — PANTOPRAZOLE SODIUM 40 MG: 40 TABLET, DELAYED RELEASE ORAL at 06:10

## 2024-02-26 RX ADMIN — FLUTICASONE PROPIONATE 2 SPRAY: 50 SPRAY, METERED NASAL at 08:31

## 2024-02-26 RX ADMIN — LEVOTHYROXINE SODIUM 125 MCG: 125 TABLET ORAL at 08:33

## 2024-02-26 RX ADMIN — ASPIRIN 81 MG 81 MG: 81 TABLET ORAL at 08:31

## 2024-02-26 RX ADMIN — VALSARTAN 80 MG: 160 TABLET, FILM COATED ORAL at 08:32

## 2024-02-26 RX ADMIN — ATORVASTATIN CALCIUM 40 MG: 40 TABLET, FILM COATED ORAL at 20:48

## 2024-02-26 ASSESSMENT — PAIN - FUNCTIONAL ASSESSMENT
PAIN_FUNCTIONAL_ASSESSMENT: 0-10

## 2024-02-26 ASSESSMENT — PAIN SCALES - GENERAL
PAINLEVEL_OUTOF10: 0 - NO PAIN

## 2024-02-26 ASSESSMENT — COGNITIVE AND FUNCTIONAL STATUS - GENERAL
WALKING IN HOSPITAL ROOM: A LOT
CLIMB 3 TO 5 STEPS WITH RAILING: A LOT
STANDING UP FROM CHAIR USING ARMS: A LITTLE
TURNING FROM BACK TO SIDE WHILE IN FLAT BAD: A LITTLE
MOVING TO AND FROM BED TO CHAIR: A LITTLE
MOBILITY SCORE: 16
MOVING FROM LYING ON BACK TO SITTING ON SIDE OF FLAT BED WITH BEDRAILS: A LITTLE

## 2024-02-26 ASSESSMENT — ACTIVITIES OF DAILY LIVING (ADL): ADL_ASSISTANCE: INDEPENDENT

## 2024-02-26 NOTE — CARE PLAN
The patient's goals for the shift include  hemodynamic stability    The clinical goals for the shift include Pt. will be HDS throughout the shift.

## 2024-02-26 NOTE — INTERVAL H&P NOTE
H&P reviewed. The patient was examined and there are no changes to the H&P.  ASA class II  Mallampati class II

## 2024-02-26 NOTE — PROGRESS NOTES
Occupational Therapy                 Therapy Communication Note    Patient Name: Josee Henson  MRN: [id number]  Today's Date: 2/26/2024     Discipline: Occupational Therapy    Missed Visit Reason: Missed Visit Reason: Patient in a medical procedure (PPM placement. No OT at this time.)    Missed Time: Attempt 1116

## 2024-02-26 NOTE — PROGRESS NOTES
Subjective Data:  89 yo female s/p TAVR     Overnight Events:    None       Objective Data:  Last Recorded Vitals:  Vitals:    02/26/24 0800 02/26/24 0817 02/26/24 0900 02/26/24 1000   BP: 123/66  108/80 130/63   Pulse: 50  50 50   Resp: 18  19 17   Temp:  35.7 °C (96.3 °F)     TempSrc:  Temporal     SpO2: 97%  97% 95%   Weight:       Height:           Last Labs:  CBC - 2/26/2024:  4:46 AM  7.3 9.8 126    30.7      CMP - 2/26/2024:  4:46 AM  9.4 6.0 19 --- 0.5   3.3 3.1 13 73      PTT - 2/26/2024:  4:46 AM  1.0   10.8 32     HGBA1C   Date/Time Value Ref Range Status   10/26/2023 09:42 AM 6.0 see below % Final   06/16/2020 12:53 PM 6.4 % Final     Comment:          Diagnosis of Diabetes-Adults   Non-Diabetic: < or = 5.6%   Increased risk for developing diabetes: 5.7-6.4%   Diagnostic of diabetes: > or = 6.5%  .       Monitoring of Diabetes                Age (y)     Therapeutic Goal (%)   Adults:          >18           <7.0   Pediatrics:    13-18           <7.5                   7-12           <8.0                   0- 6            7.5-8.5   American Diabetes Association. Diabetes Care 33(S1), Jan 2010.     06/17/2019 02:06 PM 6.2 % Final     Comment:          Diagnosis of Diabetes-Adults   Non-Diabetic: < or = 5.6%   Increased risk for developing diabetes: 5.7-6.4%   Diagnostic of diabetes: > or = 6.5%  .       Monitoring of Diabetes                Age (y)     Therapeutic Goal (%)   Adults:          >18           <7.0   Pediatrics:    13-18           <7.5                   7-12           <8.0                   0- 6            7.5-8.5   American Diabetes Association. Diabetes Care 33(S1), Jan 2010.       LDLCALC   Date/Time Value Ref Range Status   10/26/2023 09:42 AM 96 <=99 mg/dL Final     Comment:                                 Near   Borderline      AGE      Desirable  Optimal    High     High     Very High     0-19 Y     0 - 109     ---    110-129   >/= 130     ----    20-24 Y     0 - 119     ---    120-159    >/= 160     ----      >24 Y     0 -  99   100-129  130-159   160-189     >/=190       VLDL   Date/Time Value Ref Range Status   10/26/2023 09:42 AM 27 0 - 40 mg/dL Final   10/27/2022 09:55 AM 23 0 - 40 mg/dL Final   10/14/2021 10:50 AM 24 0 - 40 mg/dL Final   10/22/2020 09:36 AM 24 0 - 40 mg/dL Final      Last I/O:  I/O last 3 completed shifts:  In: - (0 mL/kg)   Out: 500 (6.9 mL/kg) [Urine:500 (0.2 mL/kg/hr)]  Weight: 72.6 kg     Past Cardiology Tests (Last 3 Years):  EKG:  ECG 12 lead 02/24/2024      ECG 12 lead 02/23/2024 (Preliminary)      ECG 12 lead       ECG 12 lead (Clinic Performed) 01/04/2024    Echo:  Transthoracic Echo (TTE) Limited 02/24/2024      Transthoracic Echo (TTE) Limited 02/23/2024      Transthoracic Echo (TTE) Complete 11/30/2023    Ejection Fractions:  EF   Date/Time Value Ref Range Status   02/24/2024 11:22 AM 75 %    02/23/2024 01:40 PM 75 %    11/30/2023 11:10 AM 69       Cath:  Cardiac catheterization - coronary 02/02/2024    Stress Test:  No results found for this or any previous visit from the past 1095 days.    Cardiac Imaging:  No results found for this or any previous visit from the past 1095 days.      Inpatient Medications:  Scheduled medications   Medication Dose Route Frequency    aspirin  81 mg oral Daily    atorvastatin  40 mg oral Nightly    [Held by provider] enoxaparin  30 mg subcutaneous Daily    fluticasone  2 spray Each Nostril Daily    levothyroxine  125 mcg oral Daily    pantoprazole  40 mg oral Daily before breakfast    perflutren lipid microspheres  0.5-10 mL of dilution intravenous Once in imaging    perflutren lipid microspheres  0.5-10 mL of dilution intravenous Once in imaging    valsartan  80 mg oral Daily     PRN medications   Medication    acetaminophen    zolpidem     Continuous Medications   Medication Dose Last Rate       Physical Exam:  Constitutional: Well developed, awake/alert/oriented x3, no distress,  cooperative  Eyes: PERRL, EOMI, clear sclera  ENMT:  mucous membranes moist, no apparent injury, no lesions seen  Head/Neck: Neck supple, no apparent injury, thyroid without mass or tenderness, No JVD, trachea midline, no bruits  Respiratory/Thorax: Patent airways,  good chest expansion, thorax symmetric, cta  Cardiovascular: Regular rate and rhythm, no murmurs, normal S 1and S 2  Gastrointestinal: Nondistended, soft, non-tender, no rebound tenderness or guarding, no masses palpable, no organomegaly, +BS, no bruits  Extremities: normal extremities, no cyanosis,  bilat groins c/d/i with dsd no s/s of hematoma  Neurological: alert and oriented x3, intact senses, motor, response and reflexes, normal strength  Psychological: Appropriate mood and behavior   Skin: Warm and dry, intact      Assessment/Plan   88 yo female admitted to CICU for complete heart block s/p TAVR for moderate to severe aortic stenosis.  Additional history includes hypertension, hyperlipidemia, hypothyroidism, GERD, and multiple joint replacements. S/p Evolut FX 26mm via RFP on 2/23/34 c/b CHB    Doing well clinically, appears euvolemic. Patient  w bifascicular block requiring need for PPM.  Anticipate DC home tomorrow          Peripheral IV 02/23/24 20 G Left Antecubital (Active)   Site Assessment Clean;Dry;Intact 02/26/24 0800   Dressing Type Transparent 02/26/24 0800   Line Status Saline locked 02/26/24 0800   Dressing Status Clean;Dry;Occlusive 02/26/24 0800   Number of days: 3       Venous Sheath 7 Fr. Right (Active)   Site Assessment Clean;Dry;Intact 02/26/24 0800   Line Status Intact and in place 02/26/24 0800   Dressing Occlusive 02/26/24 0800   Dressing Status Clean;Dry;Intact 02/26/24 0800   Dressing Change Due 02/29/24 02/26/24 0800   Color/Movement/Sensation Capillary refill less than 3 sec 02/26/24 0800   Number of days: 3       Code Status:  Full Code    I spent 50 minutes in the professional and overall care of this patient.        Cody Hu, APRN-CNP

## 2024-02-26 NOTE — PROGRESS NOTES
"Josee Henson is a 89 y.o. female on day 3 of admission presenting with Aortic stenosis.    Subjective   NAEON.        Objective     Physical Exam  General: Awake, alert, in no acute distress  HEENT: Normocephalic, atraumatic, R transvenous pacer in place, c/d/i  CV: Heart bradycardic paced at 50 bpm, 1/6 systolic murmur at LUSB  Lungs: Clear to auscultation bilaterally with no wheezes, crackles, or rhonchi  GI: Soft, nontender, nondistended  Extremities: 2+ pulses bilaterally, no lower extremity edema  Neuro: Moves all extremities equally, strength 5/5 bilaterally, AOx3    Last Recorded Vitals  Blood pressure 130/56, pulse 50, temperature 36.4 °C (97.5 °F), temperature source Temporal, resp. rate 17, height 1.549 m (5' 0.98\"), weight 72.6 kg (160 lb 0.9 oz), SpO2 96 %.  Intake/Output last 3 Shifts:  I/O last 3 completed shifts:  In: - (0 mL/kg)   Out: 500 (6.9 mL/kg) [Urine:500 (0.2 mL/kg/hr)]  Weight: 72.6 kg     Relevant Results  Scheduled medications  aspirin, 81 mg, oral, Daily  atorvastatin, 40 mg, oral, Nightly  [Held by provider] enoxaparin, 30 mg, subcutaneous, Daily  fluticasone, 2 spray, Each Nostril, Daily  levothyroxine, 125 mcg, oral, Daily  pantoprazole, 40 mg, oral, Daily before breakfast  perflutren lipid microspheres, 0.5-10 mL of dilution, intravenous, Once in imaging  perflutren lipid microspheres, 0.5-10 mL of dilution, intravenous, Once in imaging  valsartan, 80 mg, oral, Daily      Continuous medications     PRN medications  PRN medications: acetaminophen, zolpidem     Results for orders placed or performed during the hospital encounter of 02/23/24 (from the past 24 hour(s))   Renal Function Panel   Result Value Ref Range    Glucose 123 (H) 74 - 99 mg/dL    Sodium 141 136 - 145 mmol/L    Potassium 4.2 3.5 - 5.3 mmol/L    Chloride 105 98 - 107 mmol/L    Bicarbonate 27 21 - 32 mmol/L    Anion Gap 13 10 - 20 mmol/L    Urea Nitrogen 45 (H) 6 - 23 mg/dL    Creatinine 1.66 (H) 0.50 - 1.05 mg/dL "    eGFR 29 (L) >60 mL/min/1.73m*2    Calcium 9.4 8.6 - 10.6 mg/dL    Phosphorus 3.3 2.5 - 4.9 mg/dL    Albumin 3.1 (L) 3.4 - 5.0 g/dL   CBC   Result Value Ref Range    WBC 7.3 4.4 - 11.3 x10*3/uL    nRBC 0.0 0.0 - 0.0 /100 WBCs    RBC 3.38 (L) 4.00 - 5.20 x10*6/uL    Hemoglobin 9.8 (L) 12.0 - 16.0 g/dL    Hematocrit 30.7 (L) 36.0 - 46.0 %    MCV 91 80 - 100 fL    MCH 29.0 26.0 - 34.0 pg    MCHC 31.9 (L) 32.0 - 36.0 g/dL    RDW 14.8 (H) 11.5 - 14.5 %    Platelets 126 (L) 150 - 450 x10*3/uL   Magnesium   Result Value Ref Range    Magnesium 1.96 1.60 - 2.40 mg/dL   Coagulation Screen   Result Value Ref Range    Protime 10.8 9.8 - 12.8 seconds    INR 1.0 0.9 - 1.1    aPTT 32 27 - 38 seconds          Assessment/Plan   Principal Problem:    Aortic stenosis  Active Problems:    Chronic hyperglycemia    Gastroesophageal reflux disease    Hyperlipidemia    Hypertension    Hypothyroidism    Nocturnal leg cramps    Chronic renal impairment, stage 3a (CMS/HCC)    Nonrheumatic aortic valve stenosis    S/P TAVR (transcatheter aortic valve replacement)    Complete heart block, post-surgical (CMS/[facility])    Presence of temporary transvenous cardiac pacemaker    Right bundle branch block (RBBB)    89 yo female admitted to CICU for complete heart block s/p TAVR for moderate to severe aortic stenosis. Patient is hemodynamically stable with transvenous pacer in place. Additional history includes hypertension, hyperlipidemia, hypothyroidism, GERD, and multiple joint replacements. On POD 0, patient was able to tolerate several hours of native HR without pacing, however she later developed hypotension and bradycardia and required pacing again to the 60s with improvement in BP and received 1L IV fluids. EKG demonstrating bifasicular block. Ultimately developed recurrent CHB overnight 2/24 requiring PPM w/ planned placement today.    Updates 2/26:  - pending PM placement today   - post-TAVR monitoring per structural heart    NEURO  No acute  issues     CV  #Moderate to severe aortic stenosis  #s/pTAVR  : : EF 60% 11/2023, peak gradient 50mmHg, mean 29 mmHg, ADITYA 0.56, V max 3.5; low flow across the valve   - TAVR complete 2/24   - Post TAVR Echo w/ mean 8 mmHG, peak 16, ADITYA 1.6  - hgb stable post procedure     #complete heart block s/p TAVR  - Transvenous pacer in place, HR set at 50bpm  - Monitor on telemetry  - EP following for pacemaker; plan for placement today     #low BP  #HTN  - Start valsartan at 80 mg (1/2 of home dose)  - HOLD home valsartan/hydrochlorothiazide    #HLD  - Continue home atorvastatin    Pulm  Stable on room air    Endo  #hypothyroidism  - Continue home levothyroxine    Renal  Chart diagnosis of CKD  - Creatinine stable, (baseline 1.5-1.6)  - Follow up RFP, Mg post procedure    F: none  E: replete for K>4, Mg >2  N: regular diet  G: continue home omeprazole  DVT ppx: lovenox 30mg, SCDs    Code Discussion: Full Code, if clinical change and coding would have minimal chance of recovery, children would re-discuss  HCPOA completed, children (Narayan and Negrita) are decision makers. Phone: Narayan [phone] [place] [phone] (home)    Patient was seen and discussed with attending Dr. Charleen Almeida MD  Internal Medicine and Pediatrics, PGY2

## 2024-02-26 NOTE — PROGRESS NOTES
Physical Therapy    Physical Therapy Evaluation    Patient Name: Josee Henson  MRN: [id number]  Today's Date: 2/26/2024      Time In: 17:17  Time Out: 17: 44    Assessment/Plan   PT Assessment  PT Assessment Results: Decreased strength, Decreased endurance, Impaired balance, Decreased mobility  Rehab Prognosis: Excellent  End of Session Communication: Bedside nurse  End of Session Patient Position: Bed, 3 rail up, Alarm off, not on at start of session  IP OR SWING BED PT PLAN  Inpatient or Swing Bed: Inpatient  PT Plan  Treatment/Interventions: Bed mobility, Transfer training, Gait training, Stair training, Balance training, Strengthening, Endurance training, Therapeutic exercise, Therapeutic activity  PT Plan: Skilled PT  PT Frequency: 3 times per week  PT Discharge Recommendations: Low intensity level of continued care, Other (comment) (with 24 hour assist)  PT Recommended Transfer Status: Assist x1  PT - OK to Discharge: Yes      Subjective   General Visit Information:  General  Reason for Referral: s/p TAVR c/b complete heart block with TVP dependency; pt now s/p L PPM placement  Past Medical History Relevant to Rehab: aortic stenosis, hypertension, hyperlipidemia, chronic kidney disease, glucose intolerance, and obesity  Family/Caregiver Present: Yes (daughter and son present and supportive)  Prior to Session Communication: Bedside nurse  Patient Position Received: Bed, 3 rail up, Alarm off, not on at start of session  General Comment: pt agreeable to work with PT; pt instructed on PPM precautions prior to mobilization; tele, ext cath  Home Living:  Home Living  Type of Home: House  Lives With: Alone  Home Adaptive Equipment: Walker rolling or standard, Cane  Home Layout: Multi-level  Home Access: Stairs to enter with rails  Entrance Stairs-Rails: Both  Entrance Stairs-Number of Steps: 3  Prior Level of Function:  Prior Function Per Pt/Caregiver Report  Level of [place]: Independent with ADLs and  functional transfers, Needs assistance with homemaking  Receives Help From: Family  ADL Assistance: Independent  Homemaking Assistance: Needs assistance (has  come every 2 weeks to assist)  Ambulatory Assistance: Independent (with SC cane as needed for longer distance amb)  Leisure: movies with friends  Prior Function Comments: (+) drive; (+) falls - tripped on rug, no injuries but minor rug burn; community ambulator with SP cane as needed; no AD use in the home; has RW available for use as needed; pt reports her daughter will be staying with her for a few days post d/c for 24 hr assist  Precautions:  Precautions  Medical Precautions: Cardiac precautions, Fall precautions  Post-Surgical Precautions: Other (comment) (L UE PPM precautions)  Vital Signs:  Vital Signs  Heart Rate:  (pre: 64; post: 65)  Resp:  (pre: 18; post: 16)  SpO2:  (pre: 98; post: 97)  BP:  (pre: 105/50 (69); post: 108/71 (84))    Objective   Pain:  Pain Assessment  Pain Assessment: 0-10  Pain Score: 0 - No pain  Cognition:  Cognition  Overall Cognitive Status: Within Functional Limits  Arousal/Alertness: Appropriate responses to stimuli  Orientation Level: Oriented X4  Following Commands: Follows all commands and directions without difficulty  Safety Judgment: Good awareness of safety precautions    General Assessments:       Sensation  Light Touch: No apparent deficits    Strength  Strength Comments: B LE >/= 3+/5  Static Sitting Balance  Static Sitting-Balance Support: No upper extremity supported, Feet supported  Static Sitting-Level of Assistance: Close supervision  Dynamic Sitting Balance  Dynamic Sitting-Balance Support: Bilateral upper extremity supported, Feet supported  Dynamic Sitting-Comments: CGA    Static Standing Balance  Static Standing-Balance Support: Right upper extremity supported  Static Standing-Level of Assistance: Contact guard  Dynamic Standing Balance  Dynamic Standing-Balance Support: Right upper extremity  supported  Dynamic Standing-Comments: CGA  Functional Assessments:  Bed Mobility  Bed Mobility: Yes  Bed Mobility 1  Bed Mobility 1: Supine to sitting  Level of Assistance 1: Contact guard  Bed Mobility Comments 1: HOB elevated; use of bed rails for assist  Bed Mobility 2  Bed Mobility  2: Sitting to supine  Level of Assistance 2: Minimum assistance (x1)  Bed Mobility Comments 2: verbal cues for hand placement; assist for LE    Transfers  Transfer: Yes  Transfer 1  Technique 1: Sit to stand  Transfer Level of Assistance 1: Minimum assistance (x1)  Trials/Comments 1: pt denies dizziness/lightheadedness upon standing; vitals stable  Transfers 2  Technique 2: Stand to sit  Transfer Level of Assistance 2: Minimum assistance (x1)  Trials/Comments 2: pt demonstrates lack of eccentric control with stand to sit; cues for hand placement    Ambulation/Gait Training  Ambulation/Gait Training Performed: Yes  Ambulation/Gait Training 1  Surface 1: Level tile  Device 1: No device  Assistance 1:  (pt primarily MinAx1 for amb without device; with turn, pt experienced mild LOB requiring ModAx1 from therapist)  Quality of Gait 1: Narrow base of support, Inconsistent stride length, Decreased step length, Trendelenburg, Antalgic (dec cadance)  Comments/Distance (ft) 1: 80 ft; pt with mild LOB during turning requiring ModAx1 from therapist; pt demonstrates mild sway with amb without AD  Extremity/Trunk Assessments:  RLE   RLE : Within Functional Limits  LLE   LLE : Within Functional Limits  Outcome Measures:  [facility] Basic Mobility  Turning from your back to your side while in a flat bed without using bedrails: A little  Moving from lying on your back to sitting on the side of a flat bed without using bedrails: A little  Moving to and from bed to chair (including a wheelchair): A little  Standing up from a chair using your arms (e.g. wheelchair or bedside chair): A little  To walk in hospital room: A lot  Climbing 3-5 steps with railing:  A lot  Basic Mobility - Total Score: 16    Confusion Assessment Method-ICU (CAM-ICU)  Feature 1: Acute Onset or Fluctuating Course: Negative  Overall CAM-ICU: Negative    FSS-ICU  Ambulation: Walks >/ or equal to 50 feet with any assistance x1  Rolling: Supervision or set-up only  Sitting: Supervision or set-up only  Transfer Sit-to-Stand: Minimal assistance (performs 75% or more of task)  Transfer Supine-to-Sit: Minimal assistance (performs 75% or more of task)  Total Score: 20      ICU Mobility Screen  Early Mobility/Exercise Safety Screen: Proceed with mobilization - No exclusion criteria met  E = Exercise and Early Mobility  Early Mobility/Exercise Safety Screen: Proceed with mobilization - No exclusion criteria met  Current Activity: Ambulating in [place]    Encounter Problems       Encounter Problems (Active)       Balance       pt will score >/= 24 on [place] to demonstrate low fall risk  (Progressing)       Start:  02/26/24    Expected End:  03/11/24               Mobility       pt will ambulate >/= 200 ft with use of LRAD and CGA (Progressing)       Start:  02/26/24    Expected End:  03/11/24            pt will complete all bed mobility independently with use of bed rails for assist as needed  (Progressing)       Start:  02/26/24    Expected End:  03/11/24               Mobility       pt will safely navigate 3 steps with use of hand rail for assist as needed and CGA  (Progressing)       Start:  02/26/24    Expected End:  03/11/24               Pain - Adult          Safety       pt will reiterate and demonstrate PPM precautions without prompting from therapist  (Progressing)       Start:  02/26/24    Expected End:  03/11/24               Transfers       pt will complete STS transfer with LRAD and supervision  (Progressing)       Start:  02/26/24    Expected End:  03/11/24                   Education Documentation  Precautions, taught by ERNESTINA AvalosPT at 2/26/2024  6:07 PM.  Learner: Patient  Readiness:  Acceptance  Method: Explanation, Demonstration  Response: Needs Reinforcement  Comment: L PPM precautions    Mobility Training, taught by SIERRA Avalos at 2/26/2024  6:07 PM.  Learner: Patient  Readiness: Acceptance  Method: Explanation, Demonstration  Response: Needs Reinforcement  Comment: L PPM precautions    Education Comments  No comments found.      Sofiya Diaz, SPT

## 2024-02-27 ENCOUNTER — APPOINTMENT (OUTPATIENT)
Dept: RADIOLOGY | Facility: HOSPITAL | Age: 89
DRG: 267 | End: 2024-02-27
Payer: MEDICARE

## 2024-02-27 ENCOUNTER — APPOINTMENT (OUTPATIENT)
Dept: CARDIOLOGY | Facility: HOSPITAL | Age: 89
DRG: 267 | End: 2024-02-27
Payer: MEDICARE

## 2024-02-27 VITALS
DIASTOLIC BLOOD PRESSURE: 29 MMHG | HEART RATE: 69 BPM | WEIGHT: 160.05 LBS | SYSTOLIC BLOOD PRESSURE: 52 MMHG | HEIGHT: 61 IN | TEMPERATURE: 97.5 F | OXYGEN SATURATION: 96 % | RESPIRATION RATE: 21 BRPM | BODY MASS INDEX: 30.22 KG/M2

## 2024-02-27 LAB
ALBUMIN SERPL BCP-MCNC: 3.6 G/DL (ref 3.4–5)
ANION GAP SERPL CALC-SCNC: 12 MMOL/L (ref 10–20)
BUN SERPL-MCNC: 45 MG/DL (ref 6–23)
CALCIUM SERPL-MCNC: 9.5 MG/DL (ref 8.6–10.6)
CHLORIDE SERPL-SCNC: 104 MMOL/L (ref 98–107)
CO2 SERPL-SCNC: 28 MMOL/L (ref 21–32)
CREAT SERPL-MCNC: 1.9 MG/DL (ref 0.5–1.05)
EGFRCR SERPLBLD CKD-EPI 2021: 25 ML/MIN/1.73M*2
ERYTHROCYTE [DISTWIDTH] IN BLOOD BY AUTOMATED COUNT: 14.8 % (ref 11.5–14.5)
GLUCOSE SERPL-MCNC: 114 MG/DL (ref 74–99)
HCT VFR BLD AUTO: 31.7 % (ref 36–46)
HGB BLD-MCNC: 9.5 G/DL (ref 12–16)
MAGNESIUM SERPL-MCNC: 2.03 MG/DL (ref 1.6–2.4)
MCH RBC QN AUTO: 27.7 PG (ref 26–34)
MCHC RBC AUTO-ENTMCNC: 30 G/DL (ref 32–36)
MCV RBC AUTO: 92 FL (ref 80–100)
NRBC BLD-RTO: 0 /100 WBCS (ref 0–0)
PHOSPHATE SERPL-MCNC: 3.4 MG/DL (ref 2.5–4.9)
PLATELET # BLD AUTO: 121 X10*3/UL (ref 150–450)
POTASSIUM SERPL-SCNC: 4.3 MMOL/L (ref 3.5–5.3)
RBC # BLD AUTO: 3.43 X10*6/UL (ref 4–5.2)
SODIUM SERPL-SCNC: 140 MMOL/L (ref 136–145)
WBC # BLD AUTO: 8.8 X10*3/UL (ref 4.4–11.3)

## 2024-02-27 PROCEDURE — 97535 SELF CARE MNGMENT TRAINING: CPT | Mod: GO

## 2024-02-27 PROCEDURE — 36415 COLL VENOUS BLD VENIPUNCTURE: CPT | Performed by: STUDENT IN AN ORGANIZED HEALTH CARE EDUCATION/TRAINING PROGRAM

## 2024-02-27 PROCEDURE — 85027 COMPLETE CBC AUTOMATED: CPT

## 2024-02-27 PROCEDURE — 71046 X-RAY EXAM CHEST 2 VIEWS: CPT | Performed by: RADIOLOGY

## 2024-02-27 PROCEDURE — 36415 COLL VENOUS BLD VENIPUNCTURE: CPT

## 2024-02-27 PROCEDURE — 80069 RENAL FUNCTION PANEL: CPT

## 2024-02-27 PROCEDURE — 2500000001 HC RX 250 WO HCPCS SELF ADMINISTERED DRUGS (ALT 637 FOR MEDICARE OP): Performed by: STUDENT IN AN ORGANIZED HEALTH CARE EDUCATION/TRAINING PROGRAM

## 2024-02-27 PROCEDURE — 97165 OT EVAL LOW COMPLEX 30 MIN: CPT | Mod: GO

## 2024-02-27 PROCEDURE — 99231 SBSQ HOSP IP/OBS SF/LOW 25: CPT

## 2024-02-27 PROCEDURE — 2500000002 HC RX 250 W HCPCS SELF ADMINISTERED DRUGS (ALT 637 FOR MEDICARE OP, ALT 636 FOR OP/ED): Performed by: STUDENT IN AN ORGANIZED HEALTH CARE EDUCATION/TRAINING PROGRAM

## 2024-02-27 PROCEDURE — 97530 THERAPEUTIC ACTIVITIES: CPT | Mod: GP

## 2024-02-27 PROCEDURE — 99239 HOSP IP/OBS DSCHRG MGMT >30: CPT | Performed by: NURSE PRACTITIONER

## 2024-02-27 PROCEDURE — 71046 X-RAY EXAM CHEST 2 VIEWS: CPT

## 2024-02-27 PROCEDURE — 97116 GAIT TRAINING THERAPY: CPT | Mod: GP

## 2024-02-27 PROCEDURE — 83735 ASSAY OF MAGNESIUM: CPT

## 2024-02-27 RX ORDER — CEPHALEXIN 500 MG/1
500 CAPSULE ORAL EVERY 12 HOURS SCHEDULED
Status: DISCONTINUED | OUTPATIENT
Start: 2024-02-27 | End: 2024-02-27 | Stop reason: HOSPADM

## 2024-02-27 RX ORDER — VALSARTAN 80 MG/1
40 TABLET ORAL 2 TIMES DAILY
Qty: 30 TABLET | Refills: 2 | Status: SHIPPED | OUTPATIENT
Start: 2024-02-27 | End: 2024-04-09 | Stop reason: DRUGHIGH

## 2024-02-27 RX ORDER — CEPHALEXIN 500 MG/1
500 CAPSULE ORAL EVERY 12 HOURS SCHEDULED
Qty: 14 CAPSULE | Refills: 0 | Status: SHIPPED | OUTPATIENT
Start: 2024-02-27 | End: 2024-03-07 | Stop reason: ALTCHOICE

## 2024-02-27 RX ADMIN — PANTOPRAZOLE SODIUM 40 MG: 40 TABLET, DELAYED RELEASE ORAL at 06:00

## 2024-02-27 RX ADMIN — FLUTICASONE PROPIONATE 2 SPRAY: 50 SPRAY, METERED NASAL at 08:36

## 2024-02-27 RX ADMIN — LEVOTHYROXINE SODIUM 125 MCG: 125 TABLET ORAL at 08:36

## 2024-02-27 RX ADMIN — VALSARTAN 80 MG: 160 TABLET, FILM COATED ORAL at 08:37

## 2024-02-27 RX ADMIN — ASPIRIN 81 MG 81 MG: 81 TABLET ORAL at 08:36

## 2024-02-27 ASSESSMENT — PAIN - FUNCTIONAL ASSESSMENT
PAIN_FUNCTIONAL_ASSESSMENT: 0-10

## 2024-02-27 ASSESSMENT — COGNITIVE AND FUNCTIONAL STATUS - GENERAL
WALKING IN HOSPITAL ROOM: A LITTLE
TOILETING: A LITTLE
DRESSING REGULAR LOWER BODY CLOTHING: A LITTLE
MOVING FROM LYING ON BACK TO SITTING ON SIDE OF FLAT BED WITH BEDRAILS: A LITTLE
HELP NEEDED FOR BATHING: A LITTLE
TURNING FROM BACK TO SIDE WHILE IN FLAT BAD: A LITTLE
MOVING TO AND FROM BED TO CHAIR: A LITTLE
MOBILITY SCORE: 18
STANDING UP FROM CHAIR USING ARMS: A LITTLE
CLIMB 3 TO 5 STEPS WITH RAILING: A LITTLE
DAILY ACTIVITIY SCORE: 21

## 2024-02-27 ASSESSMENT — PAIN SCALES - GENERAL
PAINLEVEL_OUTOF10: 0 - NO PAIN
PAINLEVEL_OUTOF10: 0 - NO PAIN
PAINLEVEL_OUTOF10: 6
PAINLEVEL_OUTOF10: 0 - NO PAIN
PAINLEVEL_OUTOF10: 0 - NO PAIN

## 2024-02-27 ASSESSMENT — ACTIVITIES OF DAILY LIVING (ADL)
ADL_ASSISTANCE: INDEPENDENT
BATHING_ASSISTANCE: MINIMAL
HOME_MANAGEMENT_TIME_ENTRY: 15

## 2024-02-27 NOTE — PROGRESS NOTES
Physical Therapy    Physical Therapy Treatment    Patient Name: Josee Henson  MRN: [id number]  Today's Date: 2/27/2024  Time In: 1123  Time Out: 1159    Assessment/Plan   PT Assessment  PT Assessment Results: Decreased strength, Decreased endurance, Impaired balance, Decreased mobility  Rehab Prognosis: Excellent  End of Session Communication: Bedside nurse  Assessment Comment: pt tolerated treatment well; pt educated on temporary use of RW (which she has at home already) for a few days to increase balance and confidence while ambulating; additionally educated on increasing height of chair for ease with functional transfers  End of Session Patient Position: Up in chair, Alarm off, not on at start of session  PT Plan  Inpatient/Swing Bed or Outpatient: Inpatient  PT Plan  Treatment/Interventions: Bed mobility, Transfer training, Gait training, Stair training, Balance training, Strengthening, Endurance training, Therapeutic exercise, Therapeutic activity  PT Plan: Skilled PT  PT Frequency: 3 times per week  PT Discharge Recommendations: Low intensity level of continued care, Other (comment) (with 24 hour assist)  PT Recommended Transfer Status: Assist x1  PT - OK to Discharge: Yes      General Visit Information:   PT  Visit  PT Received On: 02/27/24  General  Reason for Referral: s/p TAVR c/b complete heart block with TVP dependency; pt now s/p L PPM placement  Past Medical History Relevant to Rehab: aortic stenosis, hypertension, hyperlipidemia, chronic kidney disease, glucose intolerance, and obesity  Family/Caregiver Present: Yes (daughter and son present and supportive)  Prior to Session Communication: Bedside nurse  Patient Position Received: Up in chair, Alarm off, not on at start of session  General Comment: pt agreeable to work with PT; pt with c/o of B ankle pain since this morning, but overall feeling well; telemetry; L UE PPM precautions reiterated at begining of session    Subjective    Precautions:  Precautions  Medical Precautions: Cardiac precautions, Fall precautions  Post-Surgical Precautions: Other (comment) (L UE PPM precautions)  Vital Signs:  Vital Signs  Heart Rate:  (pre: 63; post: 71)  Resp:  (pre: 18; post: 17)  SpO2:  (pre: 95; post: 97)  BP:  (pre: 99/59 (71); during (after amb) 129/58(79); post: 112/54 (69))    Objective   Pain:  Pain Assessment  Pain Assessment: 0-10  Pain Score: 6  Pain Type: Acute pain  Pain Location:  (B ankles; pt with c/o more pain on R ankle; reports pain only in weight bearing; decrease in pain reported with PROM to ankle; RN notified)  Cognition:  Cognition  Overall Cognitive Status: Within Functional Limits  Arousal/Alertness: Appropriate responses to stimuli  Orientation Level: Oriented X4  Following Commands: Follows all commands and directions without difficulty  Safety Judgment: Good awareness of safety precautions  Postural Control:  Static Sitting Balance  Static Sitting-Balance Support: No upper extremity supported, Feet supported  Static Sitting-Level of Assistance: Close supervision  Dynamic Sitting Balance  Dynamic Sitting-Balance Support: Bilateral upper extremity supported, Feet supported  Dynamic Sitting-Comments: CGA  Static Standing Balance  Static Standing-Balance Support: Right upper extremity supported  Static Standing-Level of Assistance: Contact guard  Dynamic Standing Balance  Dynamic Standing-Balance Support: Right upper extremity supported  Dynamic Standing-Comments: CGA    Activity Tolerance:  Activity Tolerance  Early Mobility/Exercise Safety Screen: Proceed with mobilization - No exclusion criteria met  Treatments:    Bed Mobility  Bed Mobility: No  Bed Mobility 1  Bed Mobility 1: Supine to sitting  Level of Assistance 1: Contact guard  Bed Mobility Comments 1: HOB elevated; use of bed rails for assist  Bed Mobility 2  Bed Mobility  2: Sitting to supine  Level of Assistance 2: Minimum assistance (x1)  Bed Mobility Comments 2:  verbal cues for hand placement; assist for LE    Ambulation/Gait Training  Ambulation/Gait Training Performed: Yes  Ambulation/Gait Training 1  Surface 1: Level tile  Device 1: Single point cane (in R arm)  Assistance 1: Minimum assistance (x1)  Quality of Gait 1: Narrow base of support, Inconsistent stride length, Decreased step length, Trendelenburg, Antalgic (dec cadance)  Comments/Distance (ft) 1: 40ft; pt with no acute LOB but requiring minAx1 to maintain steadiness during amb with SP cane; pt reports feeling more steady than amb without cane; pt reports pain in B ankle with amb but no worse than 6/10  Ambulation/Gait Training 2  Surface 2: Level tile  Device 2: Rolling walker  Assistance 2: Contact guard  Quality of Gait 2: Narrow base of support, Antalgic, Inconsistent stride length, Scissoring (dec cadance)  Comments/Distance (ft) 2: 120 ft; pt with increased steadiness when amb with RW; cues provided for foot placement as pt demonstrates occasional scissoring steps; cues for keeping  on walker light to maintain L UE PPM precautions    Transfers  Transfer: Yes  Transfer 1  Technique 1: Sit to stand  Transfer Level of Assistance 1: Minimum assistance (x1)  Trials/Comments 1: cues for hand placement; transfer from low chair height which made pt require additional assist with transfer; pt denies dizziness/chest pain/SOB upon standing;  Transfers 2  Technique 2: Stand to sit  Transfer Level of Assistance 2: Minimum assistance (x1)  Trials/Comments 2: pt demonstrates lack of eccentric control with stand to sit; cues for hand placement  Transfers 3  Technique 3: Sit to stand  Transfer Device 3: Walker  Transfer Level of Assistance 3: Contact guard  Trials/Comments 3: cues for hand placement; transfer from bed with height slightly elevated; pt requiring only CGA with higher surface  Transfers 4  Technique 4: Stand to sit  Transfer Device 4: Walker  Transfer Level of Assistance 4: Contact guard  Trials/Comments  4: cues for hand placement; pt demonstrates mild lack of eccentric control with stand to sit transfer; requiring less assistance when transferring to bed with height slightly elevated  Transfers 5  Technique 5: Sit to stand  Transfer Level of Assistance 5: Contact guard  Trials/Comments 5: cues for hand placement; transfer from bed with height slightly elevated; pt requiring only CGA with higher surface  Transfers 6  Technique 6: Stand to sit  Transfer Level of Assistance 6: Contact guard  Trials/Comments 6: cues for hand placement; pt demonstrates mild lack of eccentric control with stand to sit transfer; requiring less assistance when transferring to bed with height slightly elevated    Stairs  Stairs: Yes  Stairs  Rails 1: Right  Device 1: Railing  Assistance 1: Contact guard  Comment/Number of Steps 1: pt able to safely navigate 4 step ups/step down from 6 in stepper; pt self initiated with R LE to ascend and L LE to descent    Outcome Measures:  [facility] Basic Mobility  Turning from your back to your side while in a flat bed without using bedrails: A little  Moving from lying on your back to sitting on the side of a flat bed without using bedrails: A little  Moving to and from bed to chair (including a wheelchair): A little  Standing up from a chair using your arms (e.g. wheelchair or bedside chair): A little  To walk in hospital room: A little  Climbing 3-5 steps with railing: A little  Basic Mobility - Total Score: 18    Confusion Assessment Method-ICU (CAM-ICU)  Feature 1: Acute Onset or Fluctuating Course: Negative  Overall CAM-ICU: Negative    FSS-ICU  Ambulation: Walks >/ or equal to 50 feet with any assistance x1  Rolling: Supervision or set-up only  Sitting: Supervision or set-up only  Transfer Sit-to-Stand: Minimal assistance (performs 75% or more of task)  Transfer Supine-to-Sit: Minimal assistance (performs 75% or more of task)  Total Score: 20    ICU Mobility Screen  Early Mobility/Exercise Safety  Screen: Proceed with mobilization - No exclusion criteria met  E = Exercise and Early Mobility  Early Mobility/Exercise Safety Screen: Proceed with mobilization - No exclusion criteria met  Current Activity: Ambulating in [place]  Tinetti  Sitting Balance: Steady, safe  Arises: Able, uses arms to help  Attempts to Arise: Able to arise, one attempt  Immediate Standing Balance (First 5 Seconds): Steady but uses walker or other support  Standing Balance: Narrow stance without support  Nudged: Begins to fall  Eyes Closed: Steady  Turned 360 Degrees: Steadiness: Unsteady (Grabs, staggers)  Turned 360 Degrees: Continuity of Steps: Discontinuous steps  Sitting Down: Uses arms or not a smooth motion  Balance Score: 9  Initiation of Gait: No hesitancy  Step Height: R Swing Foot: Right foot complete clears floor  Step Length: R Swing Foot: Passes left stance foot  Step Height: L Swing Foot: Left foot complete clears floor  Step Length: L Swing Foot: Passes right stance foot  Step Symmetry: Right and left step length not equal (Estimate)  Step Continuity: Stopping or discontinuity between steps  Path: Mild/moderate deviation or uses walking aid  Trunk: Marked sway or uses walking aid  Walking Time: Heels almost touching while walking  Gait Score: 7  Total Score: 16    Education Documentation  Precautions, taught by SIERRA Avalos at 2/27/2024 12:41 PM.  Learner: Patient  Readiness: Acceptance  Method: Explanation  Response: Verbalizes Understanding  Comment: L PPM Precautions    Mobility Training, taught by SIERRA Avalos at 2/27/2024 12:41 PM.  Learner: Patient  Readiness: Acceptance  Method: Explanation  Response: Verbalizes Understanding  Comment: L PPM Precautions    Precautions, taught by SIERRA Avalos at 2/27/2024 12:41 PM.  Learner: Patient  Readiness: Acceptance  Method: Explanation  Response: Verbalizes Understanding    Mobility Training, taught by SIERRA Avalos at 2/27/2024 12:41  PM.  Learner: Patient  Readiness: Acceptance  Method: Explanation  Response: Verbalizes Understanding    Education Comments  No comments found.        Encounter Problems       Encounter Problems (Active)       Balance       pt will score >/= 24 on Tinetti to demonstrate low fall risk  (Progressing)       Start:  02/26/24    Expected End:  03/11/24               Mobility       pt will ambulate >/= 200 ft with use of LRAD and CGA (Progressing)       Start:  02/26/24    Expected End:  03/11/24            pt will complete all bed mobility independently with use of bed rails for assist as needed  (Progressing)       Start:  02/26/24    Expected End:  03/11/24               Mobility       pt will safely navigate 3 steps with use of hand rail for assist as needed and CGA  (Progressing)       Start:  02/26/24    Expected End:  03/11/24               Pain - Adult          Safety       pt will reiterate and demonstrate PPM precautions without prompting from therapist  (Progressing)       Start:  02/26/24    Expected End:  03/11/24               Transfers       pt will complete STS transfer with LRAD and supervision  (Progressing)       Start:  02/26/24    Expected End:  03/11/24                 Sofiya Diaz, SPT

## 2024-02-27 NOTE — PROGRESS NOTES
Occupational Therapy    Evaluation and Treatment    Patient Name: Josee Henson  MRN: [id number]  Today's Date: 2/27/2024  Time Calculation  Start Time: 0910  Stop Time: 0940  Time Calculation (min): 30 min    Assessment  IP OT Assessment  OT Assessment: Pt put forth good efort. Receptive to education PPM precautions and responded well to cues to maintain. Mild instability and SOB after short ambulation. Demo's overall decreased strength, endurance, and balance limiting occupational performance.  Prognosis: Excellent  Barriers to Discharge: Decreased caregiver support (Lives alone. Children staying with her for a few days, but live [place] of state.)  Evaluation/Treatment Tolerance: Patient tolerated treatment well  End of Session Communication: Bedside nurse  End of Session Patient Position: Up in chair, Alarm off, not on at start of session  Plan:  Treatment Interventions: ADL retraining, Functional transfer training, Endurance training, Fine motor coordination activities, Compensatory technique education  OT Frequency: 2 times per week  OT Discharge Recommendations: Low intensity level of continued care  Equipment Recommended upon Discharge:  (SC vs FWW)  OT Recommended Transfer Status: Minimal assist  OT - OK to Discharge: Yes (Per OT POC)    Subjective   Current Problem:  1. Nonrheumatic aortic valve stenosis  Structural heart procedure    Structural heart procedure    Structural heart procedure    Structural heart procedure      2. Nonrheumatic aortic (valve) stenosis  Transthoracic Echo (TTE) Limited    CBC    Basic Metabolic Panel    Transthoracic Echo (TTE) Limited      3. S/P TAVR (transcatheter aortic valve replacement)  CBC    Basic Metabolic Panel    Transthoracic echo (TTE) complete    Transthoracic Echo (TTE) Limited    Transthoracic Echo (TTE) Limited    Case Request EP Lab: PPM IMPLANT DUAL    Case Request EP Lab: PPM IMPLANT DUAL    Electrophysiology procedure    Electrophysiology procedure     CANCELED: Transthoracic Echo (TTE) Limited      4. Complete heart block, post-surgical (CMS/HCC)  Case Request EP Lab: PPM IMPLANT DUAL    Case Request EP Lab: PPM IMPLANT DUAL    Electrophysiology procedure    Electrophysiology procedure    Cardiac Device Check - In Clinic    Cardiac Device Check - In Clinic    Cardiac Device Check - Inpatient    Cardiac Device Check - Inpatient        General:  Reason for Referral: 89 y/o female admitted for aortic stenosis. Had TAVR c/b complete heart block with TVP dependency. Now s/p PPM placement.  Past Medical History Relevant to Rehab: aortic stenosis, hypertension, hyperlipidemia, chronic kidney disease, glucose intolerance, and obesity  Prior to Session Communication: Bedside nurse  Patient Position Received: Bed, 3 rail up, Alarm off, not on at start of session  General Comment: Pt is leasant and cooperative. Receptive to education on pacemaker precautions.   Precautions:  Medical Precautions: Cardiac precautions, Fall precautions  Post-Surgical Precautions:  (PPM)  Vital Signs:  Heart Rate: 67 (76)  Resp: 24 (18)  SpO2: 90 % (94)  BP: 118/70 (115/64)  MAP (mmHg): 77 (77)  BP Location: Right arm  BP Method: Automatic  Pain:  Pain Assessment  Pain Assessment: 0-10  Pain Score: 0 - No pain  Lines/Tubes/Drains:  External Urinary Catheter Female (Active)   Number of days: 3         Objective   Cognition:  Overall Cognitive Status: Within Functional Limits  Orientation Level: Oriented X4  Following Commands:  (Receptive to education on PPM precautiohns. Demo's good ability to maitain during functional task completion.)     Confusion Assessment Method (CAM)  Acute Onset and Fluctuating Course (1A): No  Delaney Agitation Sedation Scale  Delaney Agitation Sedation Scale (RASS): Alert and calm  Home Living:  Type of Home: House  Lives With: Alone  Home Adaptive Equipment: Walker rolling or standard, Cane  Home Layout: Multi-level  Home Access: Stairs to enter with  rails  Entrance Stairs-Rails: Both  Entrance Stairs-Number of Steps: 3   Prior Function:  Level of [place]: Independent with ADLs and functional transfers  Receives Help From: Family, Friends (Outside help for housekeeping)  ADL Assistance: Independent  Homemaking Assistance: Needs assistance (Housekeeping assist twice per month)  Ambulatory Assistance: Independent (Occasional SC use)  IADL History:  Occupation: Retired  Leisure and Hobbies: Sees a  regularly for balance and core strengthening  IADL Comments: (+)drives  ADL:  Eating Assistance: Modified independent (Device)  Grooming Assistance: Modified independent (Device)  Bathing Assistance: Minimal  UE Dressing Assistance: Minimal  LE Dressing Assistance: Minimal  Toileting Assistance with Device: Minimal  Activity Tolerance:  Endurance: Decreased tolerance for upright activites  Early Mobility/Exercise Safety Screen: Proceed with mobilization - No exclusion criteria met  Activity Tolerance Comments: Slight SOB and mild unsteadiness during ambulation  Balance:  Dynamic Sitting Balance  Dynamic Sitting-Comments: Good  Static Sitting Balance  Static Sitting-Level of Assistance: Distant supervision  Static Standing Balance  Static Standing-Level of Assistance: Contact guard  Bed Mobility/Transfers: Bed Mobility  Bed Mobility: Yes  Bed Mobility 1  Bed Mobility 1: Supine to sitting  Level of Assistance 1: Minimum assistance  Bed Mobility Comments 1: HOB raised. Cues to avoid pshing through LUE   and Transfers  Transfer: Yes  Transfer 1  Transfer From 1: Sit to  Transfer to 1: Stand  Technique 1: Sit to stand, Stand to sit  Transfer Level of Assistance 1: Moderate assistance  Trials/Comments 1: Cues to avoid excessive pushing through LUE  IADL's:   Occupation: Retired  Leisure and Hobbies: Sees a  regularly for balance and core strengthening  IADL Comments: (+)drives  Vision:     and Vision - Complex Assessment  Ocular Range of Motion: Within  Functional Limits  Sensation:  Light Touch: No apparent deficits  Strength:  Strength Comments: Not formally assessed. BUE strength observed through task completion to be at least 4/5.  Perception:  Inattention/Neglect: Appears intact  Coordination:  Movements are Fluid and Coordinated: Yes   Hand Function:  Hand Function  Gross Grasp: Functional  Coordination: Functional      Outcome Measures: [facility] Daily Activity  Putting on and taking off regular lower body clothing: A little  Bathing (including washing, rinsing, drying): A little  Putting on and taking off regular upper body clothing: None  Toileting, which includes using toilet, bedpan or urinal: A little  Taking care of personal grooming such as brushing teeth: None  Eating Meals: None  Daily Activity - Total Score: 21    Confusion Assessment Method-ICU (CAM-ICU)  Feature 3: Altered Level of Consciousness: Negative   ICU Mobility Screen  Early Mobility/Exercise Safety Screen: Proceed with mobilization - No exclusion criteria met,   Delaney Agitation Sedation Scale  Delaney Agitation Sedation Scale (RASS): Alert and calm      Education Documentation  Body Mechanics, taught by Cecille Alicia OT at 2/27/2024 10:02 AM.  Learner: Patient  Readiness: Acceptance  Method: Explanation, Demonstration  Response: Verbalizes Understanding, Demonstrated Understanding    Precautions, taught by Cecille Alicia OT at 2/27/2024 10:02 AM.  Learner: Patient  Readiness: Acceptance  Method: Explanation, Demonstration  Response: Verbalizes Understanding, Demonstrated Understanding    ADL Training, taught by Cecille Alicia OT at 2/27/2024 10:02 AM.  Learner: Patient  Readiness: Acceptance  Method: Explanation, Demonstration  Response: Verbalizes Understanding, Demonstrated Understanding    Education Comments  No comments found.        Goals:   Encounter Problems       Encounter Problems (Active)       ADLs       Patient will complete lower body dressing with Sup for donning  and doffing all LE clothes in order to increase Indep with task participation.        Start:  02/27/24    Expected End:  03/12/24            Patient will complete toileting, including clothing management and hygiene, with SBA in order to maximize functional Indep with task completion.        Start:  02/27/24    Expected End:  03/12/24                       COGNITION/SAFETY       Pt will identify and incorporate 3 EC/WS strategies into daily routines for increased safety and Indep.        Start:  02/27/24    Expected End:  03/12/24            Pt will identify and adhere to PPM precautions 100% of the time to increase functional performance.        Start:  02/27/24    Expected End:  03/12/24               EXERCISE/STRENGTHENING       Pt will increase endurance to tolerate 15min of activity with no more than 1 rest break in order to increase ability to engage in ADL completion.        Start:  02/27/24    Expected End:  03/12/24               MOBILITY       Pt will demo increased functional mobility to tolerate tasks necessary to complete ADL routine with SBA and LRD.       Start:  02/27/24    Expected End:  03/12/24                                       TRANSFERS       Patient will complete functional transfers using least restrictive device with SBA  in order to maximize functional potential and increase safety.        Start:  02/27/24    Expected End:  03/12/24                           Treatment Completed on Evaluation       Activities of Daily Living:                LE Dressing  LE Dressing: Yes  LE Dressing Where Assessed: Edge of bed  LE Dressing Comments: MIN A to fully don socks and shoes while seated EOB. Able to bring each UE up onto lap to complete. Avoid excessive use of LUE when pulling shoes on.                02/27/24 at 10:04 AM   Cecille Alicia, OT   Rehab Office: [phone]

## 2024-02-27 NOTE — DISCHARGE SUMMARY
STRUCTURAL HEART INPATIENT DISCHARGE SUMMARY    BRIEF OVERVIEW  Admitting Provider: Braydon Correa MD  Discharge Provider: Kirill Kirkpatrick DO  Primary Care Physician at Discharge: Bashir Smith [phone]     Admission Date: 2/23/2024     Discharge Date: 2/27/2024    Primary Discharge Diagnosis  Aortic stenosis    Discharge Disposition  Home  Code Status at Discharge: Full    Active Issues Requiring Follow-up  Typical TAVR and PPM follow-up    Outpatient Follow-Up  Future Appointments   Date Time Provider Department [place]   3/1/2024  8:00 AM  JAMARI [id number] CARD1 [id number] [facility]   3/7/2024 11:00 AM Elisa Hahn, APRN-CNP [id number] [facility]   3/22/2024 10:00 AM  JAMARI [id number] CARD1 [id number] [facility]   4/2/2024  8:30 AM [facility] [id number] [facility]   6/13/2024 11:00 AM Leonila Bocanegra MD [id number] [facility]   7/9/2024  1:00 PM INF 01 YASHIRA DOEmeryAINF [facility]   2/21/2025  8:00 AM  JAMARI [id number] CARD1 [id number] [facility]       [unfilled]    Test Results Pending at Discharge  Pending Labs       Order Current Status    CBC and Auto Differential Collected (02/27/24 0513)    Magnesium Collected (02/27/24 0513)    Renal Function Panel Collected (02/27/24 0513)                 Your medication list        START taking these medications        Instructions Last Dose Given Next Dose Due   cephalexin 500 mg capsule  Commonly known as: Keflex      Take 1 capsule (500 mg) by mouth every 12 hours for 7 days.       valsartan 80 mg tablet  Commonly known as: Diovan      Take 0.5 tablets (40 mg) by mouth 2 times a day.              CONTINUE taking these medications        Instructions Last Dose Given Next Dose Due   Allegra Allergy 180 mg tablet  Generic drug: fexofenadine           amoxicillin 500 mg capsule  Commonly known as: Amoxil           atorvastatin 40 mg tablet  Commonly known as: Lipitor      Take 1 tablet (40 mg) by mouth once daily.       biotin 5 mg capsule            calcium carbonate-vitamin D3 500 mg-10 mcg (400 unit) tablet  Commonly known as: Oscal-500           Ecotrin Low Strength 81 mg EC tablet  Generic drug: aspirin           flaxseed oiL 1,000 mg capsule           fluticasone 50 mcg/actuation nasal spray  Commonly known as: Flonase           levothyroxine 125 mcg tablet  Commonly known as: Synthroid, Levoxyl           multivitamin with minerals tablet           omeprazole 20 mg DR capsule  Commonly known as: PriLOSEC           Prolia 60 mg/mL syringe  Generic drug: denosumab           quiNINE 324 mg capsule  Commonly known as: Qualaquin           Tylenol Extra Strength 500 mg tablet  Generic drug: acetaminophen           zolpidem 5 mg tablet  Commonly known as: Ambien      Take 1 tablet (5 mg) by mouth as needed at bedtime for sleep.              STOP taking these medications      PRILOSEC ORAL        valsartan-hydrochlorothiazide 160-12.5 mg tablet  Commonly known as: Diovan-HCT                  Where to Get Your Medications        These medications were sent to [id number] - [facility] [place] [facility] [address] AT [facility] [address]   [address], [facility] [address]      Phone: [phone]   cephalexin 500 mg capsule  valsartan 80 mg tablet            DETAILS OF HOSPITAL STAY    Presenting Problem  Patient Active Problem List    Diagnosis Date Noted    Complete heart block, post-surgical (CMS/[facility]) 02/24/2024    Presence of temporary transvenous cardiac pacemaker 02/24/2024    Right bundle branch block (RBBB) 02/24/2024    Nonrheumatic aortic valve stenosis 02/22/2024    S/P TAVR (transcatheter aortic valve replacement) 02/22/2024    Chronic renal impairment, stage 3a (CMS/HCC) 11/09/2023    Allergic rhinitis 11/08/2023    Aortic stenosis 11/08/2023    Arthritis 11/08/2023    Osteoarthritis 11/08/2023    Bruit of right carotid artery 11/08/2023    Bursitis of left hip 11/08/2023    Carcinomas, basal cell 11/08/2023    Chronic  gout of right hip 11/08/2023    Chronic hyperglycemia 11/08/2023    Cough productive of purulent sputum 11/08/2023    Diarrhea of presumed infectious origin 11/08/2023    Dietary iron deficiency 11/08/2023    Elevated homocysteine 11/08/2023    Gastroesophageal reflux disease 11/08/2023    Gout 11/08/2023    Hyperlipidemia 11/08/2023    Hyperparathyroidism (CMS/[facility]) 11/08/2023    Hypertension 11/08/2023    Hypothyroidism 11/08/2023    Impairment of balance 11/08/2023    Insomnia 11/08/2023    Lumbar radiculopathy 11/08/2023    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    Murmur, cardiac 11/08/2023    Nocturnal leg cramps 11/08/2023    Osteoporosis 11/08/2023    Pelvic fracture (CMS/[facility]) 11/08/2023    Obesity (BMI 30.0-34.9) 11/08/2023    Primary localized osteoarthrosis of shoulder region 11/08/2023    Prediabetes 11/08/2023    Vitamin D insufficiency 11/08/2023    Vitamin deficiency 11/08/2023    Pseudophakia, both eyes 09/07/2011    Nonrheumatic aortic (valve) stenosis 01/07/2024        LOS: 4 days     Objective     Vital signs in last 24 hours:  Temp:  [35.4 °C (95.7 °F)-36.4 °C (97.5 °F)] 36.2 °C (97.2 °F)  Heart Rate:  [60-70] 70  Resp:  [12-33] 18  BP: ()/(44-71) 101/55    Physical Exam at Discharge  Discharge Condition: good  Heart Rate: 70  Resp: 18  BP: 101/55  Temp: 36.2 °C (97.2 °F)  Weight: 72.6 kg (160 lb 0.9 oz)    Constitutional: Well developed, awake/alert/oriented x3, no distress, cooperative  Eyes: PERRL, EOMI, clear sclera  ENMT: mucous membranes moist  Head/Neck: Neck supple, No JVD  Respiratory/Thorax: Good chest expansion, thorax symmetric, lung sounds clear but with diminished bases  Cardiovascular: Regular rate and rhythm, no murmurs, normal S1 and S2  Gastrointestinal: Nondistended, soft, non-tender, no rebound tenderness or guarding, no masses palpable, no organomegaly, +BS  Extremities: trace BLE edema, no cyanosis, bilat groins c/d/i with dsd no s/s of hematoma  Neurological: alert  and oriented x3, intact senses, motor, response and reflexes, normal strength  Psychological: Appropriate mood and behavior   Skin: Warm and dry, intact.       History of Present Illness  Josee Henson is a 89 y.o. female with a PMH of hypertension, hyperlipidemia, chronic kidney disease 3b, hypothryroidism, GERD, leg cramps (on Quinine), glucose intolerance, obesity (BMI 30.2), and severe non-rheumatic aortic stenosis.  Pt presented on 2/23/24 for elective TAVR.    Hospital Course  Josee Henson is now s/p TAVR Evolut FX 26mm via B/L femoral artery (Right primary) on 2/23/24 with Dr. Correa and Dr. Khanna.  Final procedure ECHO showed a mean gradient of 3, trace PVL, and no PC effusion.  Temp wire maintained via RIJ d/t the development of pacer-dependent CHB.  On admission to CICU, patient is complaining of neck pain at site of line insertion but otherwise no chest pain, difficulty breathing, or confusion. She was admitted with transvenous pacer set at 60bpm. On POD 0, patient briefly was able to sustain HR without pacing (with pacemaker set at 40 bpm). After 1-2 hours, patient developed heart block and HR dropped to the 40s with hypotension and decreased responsiveness. Pacer was increased to 60 bpm, she was given 1L IVF and BP and mentation improved. EKG showed a new RBBB.  EP was consulted.    12-lead ECG 2/24/2024: NSR 65 bpm,  ms, ICRBBB ( ms) and persistent LAFB (bifascicular AVB), no significant ST-T changes  12-lead ECG 2/23/2024 after DORIE: NSR 65 bpm,  ms, NO LVH but NEW CRBBB ( ms) and more profound LAFB (bifascicular AVB), no significant ST-T changes  12-lead ECG 1/2024: NSR 68 bpm,  ms, left axis deviation (LAFB? But narrow QRS 84 ms), NO LVH or BBB, no significant ST-T changes    POD 1 - Pt had episodes of pacing overnight.  POD 1 ECHO showed EF 70-75%, no AI, grad 16.5/8.0, no MR, triv PC effusion.  POD 2 - continued to have intermittent RV pacing    POD 3 -  2/26/24 - Dr. Bishop placed Dual Chamber PPM via left chest wall.  Post procedure CXR without pneumothorax.  H/H and SCR stable.    POD 4 - 2/27/24 - 2V CXR without pneumothorax and shows proper lead positioning.  Device check shows properly functioning device.  Ok to discharge from EP standpoint.  Pt is mostly euvolemic on assessment with no significant BLE edema, lungs clear but with diminished bases, sats stable in mid 90s.  Labs show stable H/H (9.5/31.7), acute thrombocytopenia (stable, likely d/t procedure, not clinically significant, will repeat CBC in 4-7 days), and slightly increased SCR to 1.9 (ranging 1.4-1.7 for the past couple of months).  Holding Valsartan for now to allow BP to increase, encouraging adequate PO intake, and order for repeat BMP in 4-7 days.  Pt states she is feeling well and ready to go home today.    Plan:  - D/C home today 2/27/2024  - Cont ASA 81mg (for life)  - Continue increased dose of Lipitor 40mg daily  - ** HOLD Valsartan FOR NOW d/t softer BP's (assess for restart at reduced dose as an outpatient) **          - Encourage adequate fluid and food intake while at home  - Keflex PO 500mg BID x7 days (s/p PPM)  - Resume home medications otherwise  - follow up with Structural Heart clinic in one week, 1 month, and 1 year  - Follow-up with EP/device clinic as scheduled  - f/w Dr. Traylor (Primary Cards) as scheduled or in 6-10 weeks  - f/w Bashir Smith MD in 1-2 weeks  - pt given Lab recs (1 week) and ECHO rec (1 month)       D/w Dr. Correa and CICU team    I spent 60 minutes in the professional and overall care of this patient.     Milton Marquez MSN, AGAStillman Infirmary-BC  Acute Care Nurse Practitioner  Structural Heart / TAVR Team  Structural Pager: [phone]  (Nights) ED fellow pager: [phone]

## 2024-02-27 NOTE — PROGRESS NOTES
Subjective Data:  No new complaints of chest pain / shortness of breath / incision site pain or tenderness     Overnight Events:    No pertinent overnight events     Objective Data:  Last Recorded Vitals:  Vitals:    02/27/24 0500 02/27/24 0600 02/27/24 0800 02/27/24 0821   BP: 117/51 106/52 100/71    Pulse: 68 68 63    Resp: (!) 33 19 22    Temp:    36.2 °C (97.2 °F)   TempSrc:    Temporal   SpO2: (!) 89% 90% 93%    Weight:  72.6 kg (160 lb 0.9 oz)     Height:           Last Labs:  CBC - 2/26/2024:  4:46 AM  7.3 9.8 126    30.7      CMP - 2/26/2024:  4:46 AM  9.4 6.0 19 --- 0.5   3.3 3.1 13 73      PTT - 2/26/2024:  4:46 AM  1.0   10.8 32     HGBA1C   Date/Time Value Ref Range Status   10/26/2023 09:42 AM 6.0 see below % Final   06/16/2020 12:53 PM 6.4 % Final     Comment:          Diagnosis of Diabetes-Adults   Non-Diabetic: < or = 5.6%   Increased risk for developing diabetes: 5.7-6.4%   Diagnostic of diabetes: > or = 6.5%  .       Monitoring of Diabetes                Age (y)     Therapeutic Goal (%)   Adults:          >18           <7.0   Pediatrics:    13-18           <7.5                   7-12           <8.0                   0- 6            7.5-8.5   American Diabetes Association. Diabetes Care 33(S1), Jan 2010.     06/17/2019 02:06 PM 6.2 % Final     Comment:          Diagnosis of Diabetes-Adults   Non-Diabetic: < or = 5.6%   Increased risk for developing diabetes: 5.7-6.4%   Diagnostic of diabetes: > or = 6.5%  .       Monitoring of Diabetes                Age (y)     Therapeutic Goal (%)   Adults:          >18           <7.0   Pediatrics:    13-18           <7.5                   7-12           <8.0                   0- 6            7.5-8.5   American Diabetes Association. Diabetes Care 33(S1), Jan 2010.       LDLCALC   Date/Time Value Ref Range Status   10/26/2023 09:42 AM 96 <=99 mg/dL Final     Comment:                                 Near   Borderline      AGE      Desirable  Optimal    High     High      Very High     0-19 Y     0 - 109     ---    110-129   >/= 130     ----    20-24 Y     0 - 119     ---    120-159   >/= 160     ----      >24 Y     0 -  99   100-129  130-159   160-189     >/=190       VLDL   Date/Time Value Ref Range Status   10/26/2023 09:42 AM 27 0 - 40 mg/dL Final   10/27/2022 09:55 AM 23 0 - 40 mg/dL Final   10/14/2021 10:50 AM 24 0 - 40 mg/dL Final   10/22/2020 09:36 AM 24 0 - 40 mg/dL Final      Last I/O:  I/O last 3 completed shifts:  In: - (0 mL/kg)   Out: 470 (6.5 mL/kg) [Urine:450 (0.2 mL/kg/hr); Blood:20]  Weight: 72.6 kg     Past Cardiology Tests (Last 3 Years):  EKG:  ECG 12 lead 02/24/2024      ECG 12 lead 02/23/2024      ECG 12 lead       ECG 12 lead (Clinic Performed) 01/04/2024    Echo:  Transthoracic Echo (TTE) Limited 02/24/2024      Transthoracic Echo (TTE) Limited 02/23/2024      Transthoracic Echo (TTE) Complete 11/30/2023    Ejection Fractions:  EF   Date/Time Value Ref Range Status   02/24/2024 11:22 AM 75 %    02/23/2024 01:40 PM 75 %    11/30/2023 11:10 AM 69       Cath:  Cardiac catheterization - coronary 02/02/2024    Stress Test:  No results found for this or any previous visit from the past 1095 days.    Cardiac Imaging:  No results found for this or any previous visit from the past 1095 days.      Inpatient Medications:  Scheduled medications   Medication Dose Route Frequency    aspirin  81 mg oral Daily    atorvastatin  40 mg oral Nightly    cephalexin  500 mg oral q12h OPAL    [Held by provider] enoxaparin  30 mg subcutaneous Daily    fluticasone  2 spray Each Nostril Daily    levothyroxine  125 mcg oral Daily    pantoprazole  40 mg oral Daily before breakfast    perflutren lipid microspheres  0.5-10 mL of dilution intravenous Once in imaging    perflutren lipid microspheres  0.5-10 mL of dilution intravenous Once in imaging    valsartan  80 mg oral Daily     PRN medications   Medication    acetaminophen    zolpidem     Continuous Medications   Medication Dose  Last Rate       Physical Exam:  AO x 3   NFND  On RA  Left chest incision site healthy with no erythema / tenderness   Skin integrity +  Soft and non tender abdomen   Warm extremities        Assessment/Plan   Josee Henson is a 89 y.o. female with PMH of severe symptomatic aortic stenosis and HTN, who underwent successful DORIE with 26 mm Evolut FX complicated with intraprocedural CHB. The post-DORIE ECG demonstrated NSR  with new bifascicular AVB (CRBBB and  LAFB) and EP was consulted for the further assessment.   2/24 PM: Patient developed recurrent CHB with preserved NSR 75-80 bpm.      Impression:  - Post-DORIE transient complete heart block and sustained new bifascicular AVB (RBBB and LAFB) -> Now recurrent CHB again  - Preserved LVEF 70-75% with normal DORIE valve function on TTE 2/24/2024    She underwent successful dual chamber PPM with  on 2/26/24.   CXR with no pneumothorax and stable lead position.   Device interrogation with stable lead parameters.     Recommendations:    - Oral Abx x 7 days ( Cephalexin 500 mg BID)    - Wound care with device clinic and outpatient EP follow up   - No heparin / heparin related products x 48 hours post procedure   - Ok to restart other cardiac medications      Ok to DC from an EP standpoint.     Peripheral IV 02/23/24 20 G Left Antecubital (Active)   Site Assessment Clean;Dry;Intact 02/27/24 0800   Dressing Type Transparent 02/27/24 0800   Line Status Flushed;Saline locked 02/27/24 0800   Dressing Status Clean;Dry;Occlusive 02/27/24 0800   Number of days: 4       Code Status:  Full Code    I spent 20 minutes in the professional and overall care of this patient.        Morris Blackman MD

## 2024-02-27 NOTE — PROGRESS NOTES
"Josee Henson is a 89 y.o. female on day 4 of admission presenting with Aortic stenosis.    Subjective   Patient doing well, had 2-view cxr this am.        Objective     Physical Exam  General: Awake, alert, in no acute distress  HEENT: Normocephalic, atraumatic, R transvenous pacer in place, c/d/i  CV: Regular rate and rhythm, 1/6 systolic murmur at LUSB  Lungs: Clear to auscultation bilaterally with no wheezes, crackles, or rhonchi  GI: Soft, nontender, nondistended  Extremities: 2+ pulses bilaterally, no lower extremity edema  Skin: TVP site c/d/I with bandage in place, pacemaker bandage c/d/I, bilateral groin sites with bruising but no pulsations, palpable hematoma, or tenderness  Neuro: Moves all extremities equally, strength 5/5 bilaterally, Aox3    Last Recorded Vitals  Blood pressure 100/71, pulse 63, temperature 36.2 °C (97.2 °F), temperature source Temporal, resp. rate 22, height 1.549 m (5' 0.98\"), weight 72.6 kg (160 lb 0.9 oz), SpO2 93 %.  Intake/Output last 3 Shifts:  I/O last 3 completed shifts:  In: - (0 mL/kg)   Out: 470 (6.5 mL/kg) [Urine:450 (0.2 mL/kg/hr); Blood:20]  Weight: 72.6 kg     Relevant Results  Scheduled medications  aspirin, 81 mg, oral, Daily  atorvastatin, 40 mg, oral, Nightly  cephalexin, 500 mg, oral, q12h OPAL  [Held by provider] enoxaparin, 30 mg, subcutaneous, Daily  fluticasone, 2 spray, Each Nostril, Daily  levothyroxine, 125 mcg, oral, Daily  pantoprazole, 40 mg, oral, Daily before breakfast  perflutren lipid microspheres, 0.5-10 mL of dilution, intravenous, Once in imaging  perflutren lipid microspheres, 0.5-10 mL of dilution, intravenous, Once in imaging  valsartan, 80 mg, oral, Daily      Continuous medications     PRN medications  PRN medications: acetaminophen, zolpidem     No results found for this or any previous visit (from the past 24 hour(s)).    Assessment/Plan   Principal Problem:    Aortic stenosis  Active Problems:    Chronic hyperglycemia    Gastroesophageal " reflux disease    Hyperlipidemia    Hypertension    Hypothyroidism    Nocturnal leg cramps    Chronic renal impairment, stage 3a (CMS/HCC)    Nonrheumatic aortic valve stenosis    S/P TAVR (transcatheter aortic valve replacement)    Complete heart block, post-surgical (CMS/[facility])    Presence of temporary transvenous cardiac pacemaker    Right bundle branch block (RBBB)    88 yo female admitted to CICU for complete heart block s/p TAVR for moderate to severe aortic stenosis. Patient is hemodynamically stable with transvenous pacer in place. Additional history includes hypertension, hyperlipidemia, hypothyroidism, GERD, and multiple joint replacements. On POD 0, patient was able to tolerate several hours of native HR without pacing, however she later developed hypotension and bradycardia and required pacing again to the 60s with improvement in BP and received 1L IV fluids. EKG demonstrating bifasicular block. Ultimately developed recurrent CHB overnight 2/24 requiring PPM w/ planned placement today.     Updates 2/27:  - discharge today  - Start keflex 500 mg BID per EP  - No driving for 1 week post PPM placement   - post-TAVR monitoring per structural heart     NEURO  No acute issues      CV  #Moderate to severe aortic stenosis  #s/pTAVR  : : EF 60% 11/2023, peak gradient 50mmHg, mean 29 mmHg, ADITYA 0.56, V max 3.5; low flow across the valve   - TAVR complete 2/24   - Post TAVR Echo w/ mean 8 mmHG, peak 16, ADITYA 1.6  - hgb stable post procedure      #complete heart block s/p TAVR  - Transvenous pacer in place, HR set at 50bpm  - Monitor on telemetry  - EP following for pacemaker; plan for placement today      #low BP  #HTN  - Start valsartan at 80 mg (1/2 of home dose)  - HOLD home valsartan/hydrochlorothiazide     #HLD  - Continue home atorvastatin     Pulm  Stable on room air     Endo  #hypothyroidism  - Continue home levothyroxine     Renal  Chart diagnosis of CKD  - Creatinine stable, (baseline 1.5-1.6)  - Follow up  RFP, Mg post procedure     F: none  E: replete for K>4, Mg >2  N: regular diet  G: continue home omeprazole  DVT ppx: lovenox 30mg, SCDs     Code Discussion: Full Code, if clinical change and coding would have minimal chance of recovery, children would re-discuss  HCPOA completed, children (Narayan and Negrita) are decision makers. Phone: Narayan [phone] [place] [phone] (home)     Patient was seen and discussed with attending Dr. Kaya Almeida MD  Internal Medicine and Pediatrics, PGY2

## 2024-02-28 DIAGNOSIS — I10 PRIMARY HYPERTENSION: ICD-10-CM

## 2024-02-28 RX ORDER — VALSARTAN 40 MG/1
40 TABLET ORAL 2 TIMES DAILY
Qty: 60 TABLET | Refills: 11 | Status: SHIPPED | OUTPATIENT
Start: 2024-02-28 | End: 2024-03-01 | Stop reason: WASHOUT

## 2024-03-01 ENCOUNTER — PATIENT OUTREACH (OUTPATIENT)
Dept: CARE COORDINATION | Facility: CLINIC | Age: 89
End: 2024-03-01
Payer: MEDICARE

## 2024-03-01 ENCOUNTER — TELEMEDICINE (OUTPATIENT)
Dept: CARDIOLOGY | Facility: HOSPITAL | Age: 89
End: 2024-03-01
Payer: MEDICARE

## 2024-03-01 DIAGNOSIS — Z95.2 S/P TAVR (TRANSCATHETER AORTIC VALVE REPLACEMENT): Primary | ICD-10-CM

## 2024-03-01 PROBLEM — I35.0 NONRHEUMATIC AORTIC (VALVE) STENOSIS: Status: RESOLVED | Noted: 2024-01-07 | Resolved: 2024-03-01

## 2024-03-01 PROBLEM — I35.0 NONRHEUMATIC AORTIC VALVE STENOSIS: Status: RESOLVED | Noted: 2024-02-22 | Resolved: 2024-03-01

## 2024-03-01 PROBLEM — R01.1 MURMUR, CARDIAC: Status: RESOLVED | Noted: 2023-11-08 | Resolved: 2024-03-01

## 2024-03-01 PROBLEM — I35.0 AORTIC STENOSIS: Status: RESOLVED | Noted: 2023-11-08 | Resolved: 2024-03-01

## 2024-03-01 PROCEDURE — 99443 PR PHYS/QHP TELEPHONE EVALUATION 21-30 MIN: CPT | Performed by: NURSE PRACTITIONER

## 2024-03-01 PROCEDURE — 1111F DSCHRG MED/CURRENT MED MERGE: CPT | Performed by: NURSE PRACTITIONER

## 2024-03-01 PROCEDURE — 1036F TOBACCO NON-USER: CPT | Performed by: NURSE PRACTITIONER

## 2024-03-01 PROCEDURE — 1160F RVW MEDS BY RX/DR IN RCRD: CPT | Performed by: NURSE PRACTITIONER

## 2024-03-01 PROCEDURE — 1126F AMNT PAIN NOTED NONE PRSNT: CPT | Performed by: NURSE PRACTITIONER

## 2024-03-01 PROCEDURE — 1159F MED LIST DOCD IN RCRD: CPT | Performed by: NURSE PRACTITIONER

## 2024-03-01 NOTE — PROGRESS NOTES
Structural Heart Follow up visit      Jairo Henson is a 89 y.o. female presents today for 1 week follow up s/p TAVR    denies SOB,MEDELLIN, fatigue  Recent Hospitalizations  No    patient with   Past Medical History:   Diagnosis Date    Aortic stenosis     Hyperlipidemia     Hypertension        Results for orders placed or performed during the hospital encounter of 02/23/24 (from the past 96 hour(s))   CBC   Result Value Ref Range    WBC 8.8 4.4 - 11.3 x10*3/uL    nRBC 0.0 0.0 - 0.0 /100 WBCs    RBC 3.43 (L) 4.00 - 5.20 x10*6/uL    Hemoglobin 9.5 (L) 12.0 - 16.0 g/dL    Hematocrit 31.7 (L) 36.0 - 46.0 %    MCV 92 80 - 100 fL    MCH 27.7 26.0 - 34.0 pg    MCHC 30.0 (L) 32.0 - 36.0 g/dL    RDW 14.8 (H) 11.5 - 14.5 %    Platelets 121 (L) 150 - 450 x10*3/uL   Renal function panel   Result Value Ref Range    Glucose 114 (H) 74 - 99 mg/dL    Sodium 140 136 - 145 mmol/L    Potassium 4.3 3.5 - 5.3 mmol/L    Chloride 104 98 - 107 mmol/L    Bicarbonate 28 21 - 32 mmol/L    Anion Gap 12 10 - 20 mmol/L    Urea Nitrogen 45 (H) 6 - 23 mg/dL    Creatinine 1.90 (H) 0.50 - 1.05 mg/dL    eGFR 25 (L) >60 mL/min/1.73m*2    Calcium 9.5 8.6 - 10.6 mg/dL    Phosphorus 3.4 2.5 - 4.9 mg/dL    Albumin 3.6 3.4 - 5.0 g/dL   Magnesium   Result Value Ref Range    Magnesium 2.03 1.60 - 2.40 mg/dL        Transthoracic Echo (TTE) Limited    Result Date: 2/24/2024   [facility], [address] [place] [address]                Tel [phone] and Fax [phone] TRANSTHORACIC ECHOCARDIOGRAM REPORT  Patient Name:      JAIRO HENSON     Reading Physician:    [id number] Ortiz Napoles MD Study Date:        2/24/2024            Ordering Provider:    [id number] ASHLEE HOWE MRN/PID:           [id number]             Fellow: Accession#:        [id number]         Nurse: Date of Birth/Age: 1935 / 89 years  Sonographer:          Gray Lei                                                               ELE Gender:            F                    Additional Staff: Height:            152.40 cm            Admit Date:           2/23/2024 Weight:            72.58 kg             Admission Status:     Inpatient -                                                               Routine BSA / BMI:         1.70 m2 / 31.25      Encounter#:           [id number]                    kg/m2                                         Department Location:  [facility] Blood Pressure: 133 /51 mmHg Study Type:    TRANSTHORACIC ECHO (TTE) LIMITED Diagnosis/ICD: Presence of prosthetic heart valve-Z95.2 Indication:    S/P TAVR CPT Code:      Echo Limited-35024; Doppler Limited-08021; Color Doppler-71863 Patient History: Pertinent         HTN; HLD; murmur; CKD; AS s/p TAVR (26mm Evolut FX, History:          done:2/23/24). Study Detail: The following Echo studies were performed: 2D, M-Mode, Doppler and               color flow. Technically challenging study due to body habitus and               poor acoustic windows. Definity used as a contrast agent for               endocardial border definition. Total contrast used for this               procedure was 2.0 mL via IV push.  PHYSICIAN INTERPRETATION: Left Ventricle: The left ventricular systolic function is hyperdynamic, with an estimated ejection fraction of 70-75%. There are no regional wall motion abnormalities. The left ventricular cavity size is normal. Left ventricular diastolic filling was not assessed. Left Atrium: The left atrium is normal in size. Right Ventricle: The right ventricle is normal in size. There is normal right ventricular global systolic function. Right Atrium: The right atrium was not well visualized. Aortic Valve: The aortic valve appears abnormal. There is a Medtronic transcatheter aortic valve replacement, with a 29 mm reported size. Echo findings are consistent  with normal aortic valve prosthesis structure and function. There is no evidence of aortic valve regurgitation. The peak instantaneous gradient of the aortic valve is 16.5 mmHg. The mean gradient of the aortic valve is 8.0 mmHg. Mitral Valve: The mitral valve is normal in structure. There is no evidence of mitral valve regurgitation. Tricuspid Valve: The tricuspid valve was not well visualized. Tricuspid regurgitation was not assessed. Pulmonic Valve: The pulmonic valve is not well visualized. There is physiologic pulmonic valve regurgitation. Pericardium: There is a trivial pericardial effusion. Aorta: The aortic root is normal.  CONCLUSIONS:  1. Left ventricular systolic function is hyperdynamic with a 70-75% estimated ejection fraction.  2. Aortic valve appears abnormal.  3. There is a transcatheter aortic valve replacement. QUANTITATIVE DATA SUMMARY: 2D MEASUREMENTS:                           Normal Ranges: Ao Root d:     3.10 cm    (2.0-3.7cm) LAs:           4.40 cm    (2.7-4.0cm) IVSd:          1.20 cm    (0.6-1.1cm) LVPWd:         1.20 cm    (0.6-1.1cm) LVIDd:         5.00 cm    (3.9-5.9cm) LVIDs:         3.10 cm LV Mass Index: 137.7 g/m2 LV % FS        38.0 % LA VOLUME:                             Normal Ranges: LA Volume Index: 18.8 ml/m2 LV SYSTOLIC FUNCTION BY 2D PLANIMETRY (MOD):                     Normal Ranges: EF-A4C View: 77.8 % (>=55%) EF-A2C View: 72.2 % EF-Biplane:  75.5 % LV DIASTOLIC FUNCTION:                              Normal Ranges: MV Peak E:        0.56 m/s   (0.7-1.2 m/s) MV Peak A:        0.78 m/s   (0.42-0.7 m/s) E/A Ratio:        0.72       (1.0-2.2) MV e'             0.06 m/s   (>8.0) E/e' Ratio:       9.55       (<8.0) MV DT:            275 msec   (150-240 msec) PulmV Sys Biju:    48.00 cm/s PulmV Galan Biju:   47.10 cm/s PulmV S/D Biju:    1.00 PulmV A Revs Biju: 28.30 cm/s MITRAL VALVE:                 Normal Ranges: MV DT: 275 msec (150-240msec) AORTIC VALVE:                                     Normal Ranges: AoV Vmax:                2.03 m/s  (<=1.7m/s) AoV Peak P.5 mmHg (<20mmHg) AoV Mean P.0 mmHg  (1.7-11.5mmHg) LVOT Max Biju:            1.29 m/s  (<=1.1m/s) AoV VTI:                 35.30 cm  (18-25cm) LVOT VTI:                25.10 cm LVOT Diameter:           1.80 cm   (1.8-2.4cm) AoV Area, VTI:           1.81 cm2  (2.5-5.5cm2) AoV Area,Vmax:           1.62 cm2  (2.5-4.5cm2) AoV Dimensionless Index: 0.71  RIGHT VENTRICLE: TAPSE: 18.2 mm RV s'  0.16 m/s Pulmonary Veins: PulmV A Revs Biju: 28.30 cm/s PulmV Galan Biju:   47.10 cm/s PulmV S/D Biju:    1.00 PulmV Sys Biju:    48.00 cm/s  [id number] Ortiz Napoles MD Electronically signed on 2024 at 1:31:50 PM  ** Final **     Transthoracic Echo (TTE) Limited    Result Date: 2024   [facility], [address] [place] [address]                Tel [phone] and Fax [phone] TRANSTHORACIC ECHOCARDIOGRAM REPORT  Patient Name:      JAIRO JOYCE Chavira Physician:    [id number] Moise Jimenez MD Study Date:        2024            Ordering Provider:    [id number] LOPEZ JUAREZ MRN/PID:           [id number]             Fellow: Accession#:        [id number]         Nurse: Date of Birth/Age: 1935 / 89 years Sonographer:          BIBIANA Stone RDCS Gender:            F                    Additional Staff: Height:            154.94 cm            Admit Date:           2024 Weight:            72.58 kg             Admission Status:     Inpatient -                                                               Routine BSA / BMI:         1.72 m2 / 30.23      Encounter#:           [id number]                    kg/m2                                         Department Location:  [facility]                                                                Cath Lab Blood Pressure: 141 /63 mmHg Study Type:    TRANSTHORACIC ECHO (TTE) LIMITED Diagnosis/ICD: Nonrheumatic aortic (valve) stenosis-I35.0 Indication:    TAVR Periprocedure CPT Code:      Echo Limited-80310; Doppler Limited-17328; Color Doppler-68985 Patient History: Pertinent History: HTN, Hyperlipidemia and Murmur. CKD, AS. Study Detail: The following Echo studies were performed: 2D, M-Mode, Doppler and               color flow. Technically challenging study due to patient lying in               supine position and body habitus.  PHYSICIAN INTERPRETATION: Left Ventricle: The left ventricular systolic function is normal, with an estimated ejection fraction of 70%. There are no regional wall motion abnormalities. The left ventricular cavity size is normal. Spectral Doppler shows a normal pattern of left ventricular diastolic filling. There is concentric LVH. Left Atrium: The left atrium is normal in size. Right Ventricle: The right ventricle is normal in size. There is normal right ventricular global systolic function. Right Atrium: The right atrium is normal in size. Aortic Valve: The aortic valve is probably trileaflet. There is moderate to severe aortic valve cusp calcification. The aortic valve dimensionless index is 0.32. There is no evidence of aortic valve regurgitation. The peak instantaneous gradient of the aortic valve is 39.4 mmHg. The mean gradient of the aortic valve is 22.0 mmHg. Mitral Valve: The mitral valve is normal in structure. There is mild to moderate mitral valve regurgitation. Tricuspid Valve: The tricuspid valve is structurally normal. There is trace to mild tricuspid regurgitation. The Doppler estimated RVSP is mildly elevated at 35.5 mmHg. Pulmonic Valve: The pulmonic valve is not well visualized. The pulmonic valve regurgitation was not well visualized. Pericardium: There is no pericardial effusion noted. Aorta: The aortic root is normal.   Post Transcatheter Aortic Valve Placement (TAVR): The peak instantaneous gradient of the aortic valve is 12.4 mmHg. The mean gradient of the aortic valve is 5.0 mmHg. There is a Medtronic transcatheter aortic valve replacement, with a 26 mm reported size. There is no mckenna-prosthetic aortic valve regurgitation.  CONCLUSIONS:  1. Left ventricular systolic function is normal with a 70% estimated ejection fraction.  2. Poorly visualized anatomical structures due to suboptimal image quality.  3. Mild to moderate mitral valve regurgitation.  4. Mildly elevated RVSP.  5. There is moderate to severe aortic valve cusp calcification. QUANTITATIVE DATA SUMMARY: 2D MEASUREMENTS:                          Normal Ranges: Ao Root d:     2.80 cm   (2.0-3.7cm) LAs:           4.00 cm   (2.7-4.0cm) IVSd:          0.80 cm   (0.6-1.1cm) LVPWd:         0.80 cm   (0.6-1.1cm) LVIDd:         4.90 cm   (3.9-5.9cm) LVIDs:         3.10 cm LV Mass Index: 76.4 g/m2 LV % FS        36.7 % LA VOLUME:                               Normal Ranges: LA Vol A4C:        55.8 ml    (22+/-6mL/m2) LA Vol A2C:        46.8 ml LA Vol BP:         51.2 ml LA Vol Index A4C:  32.5ml/m2 LA Vol Index A2C:  27.2 ml/m2 LA Vol Index BP:   29.8 ml/m2 LA Area A4C:       18.7 cm2 LA Area A2C:       17.1 cm2 LA Major Axis A4C: 5.3 cm LA Major Axis A2C: 5.3 cm LA Volume Index:   29.8 ml/m2 AORTA MEASUREMENTS:                    Normal Ranges: Asc Ao, d: 2.80 cm (2.1-3.4cm) LV SYSTOLIC FUNCTION BY 2D PLANIMETRY (MOD):                     Normal Ranges: EF-A4C View: 78.6 % (>=55%) EF-A2C View: 69.6 % EF-Biplane:  75.1 % LV DIASTOLIC FUNCTION:                           Normal Ranges: MV Peak E:    0.61 m/s    (0.7-1.2 m/s) MV Peak A:    0.56 m/s    (0.42-0.7 m/s) E/A Ratio:    1.09        (1.0-2.2) MV e'         0.10 m/s    (>8.0) MV lateral e' 0.12 m/s MV medial e'  0.07 m/s MV A Dur:     129.00 msec E/e' Ratio:   6.45        (<8.0) MV DT:        269 msec    (150-240 msec)  MITRAL VALVE:                 Normal Ranges: MV DT: 269 msec (150-240msec) AORTIC VALVE:                                              Normal Ranges: AoV Vmax:                          3.14 m/s  (<=1.7m/s) AoV Vmax Post TAVR:                1.76 m/s  (<=1.7m/s) AoV Peak P.4 mmHg (<20mmHg) AoV Peak PG Post TAVR:             12.4 mmHg (<20mmHg) AoV Mean P.0 mmHg (1.7-11.5mmHg) AoV Mean PG Post TAVR:             5.0 mmHg  (1.7-11.5mmHg) LVOT Max Biju:                      1.14 m/s  (<=1.1m/s) LVOT Max Biju Post TAVR:            1.14 m/s  (<=1.1m/s) AoV VTI:                           73.40 cm  (18-25cm) AoV VTI Post TAVR:                 34.40 cm  (18-25cm) LVOT VTI:                          23.80 cm LVOT VTI Post TAVR:                23.80 cm LVOT Diameter:                     1.80 cm   (1.8-2.4cm) LVOT Diameter Post TAVR:           1.80 cm   (1.8-2.4cm) AoV Area, VTI:                     0.83 cm2  (2.5-5.5cm2) AoV Area, VTI Post TAVR:           1.76 cm2  (2.5-5.5cm2) AoV Area,Vmax:                     0.92 cm2  (2.5-4.5cm2) AoV Area,Vmax Post TAVR:           1.65 cm2  (2.5-4.5cm2) AoV Dimensionless Index:           0.32 AoV Dimensionless Index Post TAVR: 0.69  RIGHT VENTRICLE: TAPSE: 19.3 mm RV s'  0.11 m/s TRICUSPID VALVE/RVSP:                             Normal Ranges: Peak TR Velocity: 2.85 m/s RV Syst Pressure: 35.5 mmHg (< 30mmHg) IVC Diam:         1.14 cm  [id number] Moise Jimenez MD Electronically signed on 2024 at 5:40:09 PM  ** Final **     Transthoracic Echo (TTE) Complete    Result Date: 2023   [facility], [address] [place] [address]              Tel [phone] and Fax [phone] TRANSTHORACIC ECHOCARDIOGRAM REPORT  Patient Name:     JAIRO Chavira Physician:  [id number] Lazaro Stovall DO Study Date:       2023          Ordering Provider:  [id number] ALAYNA CARTY MRN/PID:          [id number]            Fellow: Accession#:        [id number]        Nurse: Date of           1935 / 88      Sonographer:        Carlos Gutierrez RDMS Birth/Age:        years Gender:           F                   Additional Staff:   Lena Casanova RD Height:           154.00 cm           Admit Date: Weight:           74.00 kg            Admission Status:   Outpatient BSA:              1.72 m2             Encounter#:         [id number]                                       Department          [facility]                                       Location:           Invasive Blood Pressure: 122 /78 mmHg Study Type:    TRANSTHORACIC ECHO (TTE) COMPLETE Diagnosis/ICD: Nonrheumatic aortic (valve) stenosis-I35.0 Indication:    AS CPT Code:      Echo Complete w Full Doppler-48004 Patient History: Pertinent History: HTN. Study Detail: The following Echo studies were performed: 2D, M-Mode, Doppler and               color flow.  PHYSICIAN INTERPRETATION: Left Ventricle: The left ventricular systolic function is normal, with an estimated ejection fraction of 60%. There are no regional wall motion abnormalities. The left ventricular cavity size is normal. Spectral Doppler shows a pseudonormal pattern of left ventricular diastolic filling. Left Atrium: The left atrium is upper limits of normal in size. Right Ventricle: The right ventricle is normal in size. There is normal right ventricular global systolic function. Right Atrium: The right atrium is normal in size. Aortic Valve: The aortic valve is probably trileaflet. There is moderate to severe aortic valve cusp calcification. There is evidence of moderate to severe aortic valve stenosis. There is low flow across the aortic valve, with a normal ejection fraction. The aortic valve dimensionless index is 0.23. There is no evidence of aortic valve regurgitation. The peak instantaneous gradient of the aortic valve is 50.1 mmHg. The mean gradient of the aortic valve is 29.0 mmHg. Mitral Valve: The mitral valve is mildly  thickened. There is mild mitral valve regurgitation. Tricuspid Valve: The tricuspid valve is structurally normal. No evidence of tricuspid regurgitation. Pulmonic Valve: The pulmonic valve is structurally normal. There is no indication of pulmonic valve regurgitation. Pericardium: There is no pericardial effusion noted. Aorta: The aortic root is normal. In comparison to the previous echocardiogram(s): Compared with study from 11/8/2022, dimensionless index 0.23 down from 0.34 suggesting more severe aortic stenosis. Clinical correlation suggested.  CONCLUSIONS:  1. Left ventricular systolic function is normal with a 60% estimated ejection fraction.  2. Spectral Doppler shows a pseudonormal pattern of left ventricular diastolic filling.  3. Moderate to severe aortic valve stenosis. pk/mn 50/29 mmHg, DI 0.23, ADITYA 0.6 suggests low flow low gradient severe type.  4. There is moderate to severe aortic valve cusp calcification.  5. Compared with study from 11/8/2022, dimensionless index 0.23 down from 0.34 suggesting more severe aortic stenosis. Clinical correlation suggested. QUANTITATIVE DATA SUMMARY: 2D MEASUREMENTS:                           Normal Ranges: IVSd:          1.50 cm    (0.6-1.1cm) LVPWd:         1.40 cm    (0.6-1.1cm) LVIDd:         4.00 cm    (3.9-5.9cm) LVIDs:         2.70 cm LV Mass Index: 127.9 g/m2 LV % FS        32.5 % LA VOLUME:                               Normal Ranges: LA Vol A4C:        65.0 ml    (22+/-6mL/m2) LA Vol A2C:        55.9 ml LA Vol BP:         63.0 ml LA Vol Index A4C:  37.7ml/m2 LA Vol Index A2C:  32.4 ml/m2 LA Vol Index BP:   36.5 ml/m2 LA Area A4C:       19.5 cm2 LA Area A2C:       18.9 cm2 LA Major Axis A4C: 5.0 cm LA Major Axis A2C: 5.4 cm LA Volume Index:   36.5 ml/m2 RA VOLUME BY A/L METHOD:                               Normal Ranges: RA Vol A4C:        35.2 ml    (8.3-19.5ml) RA Vol Index A4C:  20.4 ml/m2 RA Area A4C:       15.3 cm2 RA Major Axis A4C: 5.7 cm M-MODE  MEASUREMENTS:                  Normal Ranges: Ao Root: 2.50 cm (2.0-3.7cm) LAs:     5.20 cm (2.7-4.0cm) AORTA MEASUREMENTS:                      Normal Ranges: Ao Sinus, d: 2.57 cm (2.1-3.5cm) Ao STJ, d:   1.83 cm (1.7-3.4cm) Asc Ao, d:   3.20 cm (2.1-3.4cm) LV SYSTOLIC FUNCTION BY 2D PLANIMETRY (MOD):                     Normal Ranges: EF-A4C View: 66.7 % (>=55%) EF-A2C View: 69.9 % EF-Biplane:  69.3 % LV DIASTOLIC FUNCTION:                               Normal Ranges: MV Peak E:        0.66 m/s    (0.7-1.2 m/s) MV Peak A:        0.71 m/s    (0.42-0.7 m/s) E/A Ratio:        0.92        (1.0-2.2) MV lateral e'     0.13 m/s MV medial e'      0.06 m/s E/e' Ratio:       5.00        (<8.0) PulmV Sys Biju:    60.20 cm/s PulmV Galan Biju:   65.20 cm/s PulmV S/D Biju:    0.90 PulmV A Revs Biju: 20.70 cm/s PulmV A Revs Dur: 109.00 msec MITRAL VALVE:                 Normal Ranges: MV DT: 387 msec (150-240msec) AORTIC VALVE:                                    Normal Ranges: AoV Vmax:                3.54 m/s  (<=1.7m/s) AoV Peak P.1 mmHg (<20mmHg) AoV Mean P.0 mmHg (1.7-11.5mmHg) LVOT Max Biju:            0.94 m/s  (<=1.1m/s) AoV VTI:                 90.80 cm  (18-25cm) LVOT VTI:                21.20 cm LVOT Diameter:           1.74 cm   (1.8-2.4cm) AoV Area, VTI:           0.56 cm2  (2.5-5.5cm2) AoV Area,Vmax:           0.63 cm2  (2.5-4.5cm2) AoV Dimensionless Index: 0.23  RIGHT VENTRICLE: RV Basal 3.14 cm RV Mid   2.37 cm RV Major 4.8 cm TAPSE:   23.3 mm RV s'    0.13 m/s TRICUSPID VALVE/RVSP:                             Normal Ranges: Peak TR Velocity: 2.88 m/s Est. RA Pressure: 3 mmHg RV Syst Pressure: 36.2 mmHg (< 30mmHg) IVC Diam:         1.06 cm PULMONIC VALVE:                       Normal Ranges: PV Max Biju: 1.8 m/s   (0.6-0.9m/s) PV Max P.7 mmHg Pulmonary Veins: PulmV A Revs Dur: 109.00 msec PulmV A Revs Biju: 20.70 cm/s PulmV Galan Biju:   65.20 cm/s PulmV S/D Biju:    0.90 PulmV Sys  Biju:    60.20 cm/s  [id number] Lazaro Stovall DO Electronically signed on 11/30/2023 at 5:12:13 PM  ** Final **                  Heart Failure Follow up    NYHA class 1    Edema Denies  Dyspnea on Exertion Denies  Fatigue Improved  Exercise Intolerance Improved  Orthopnea Denies  PND Denies    Chest pain No  Syncope No  Palpitations No    All organ systems normal except: sole of feet pain                 Josee Henson is now s/p TAVR Evolut FX 26mm via B/L femoral artery (Right primary) on 2/23/24 with Dr. Correa and Dr. Khanna.  Final procedure ECHO showed a mean gradient of 3, trace PVL, and no PC effusion.  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  EP was consulted.  POD 1 ECHO showed EF 70-75%, no AI, grad 16.5/8.0, no MR, triv PC effusion.  On 2/26/24 - Dr. Bishop placed Dual Chamber PPM via left chest wall.  Pt had a slight bump in renal indices the next day, her Valsartan was stopped (encouraged to increased oral intake), and was discharged home on 2/27/24.  Presents today for 1 week follow-up.     Impression  - 1 week s/p TAVR  - states she is feeling better and better each day, limited by the pain in the sole of her feet and ankle pain (likely d/t prolonged bed rest in the hospital)  - HF symptoms:  denies SOB/MEDELLIN and BLE edema, confirms fatigue  - Pt did not take Valsartan for first 24hrs post discharge, but now has been taking.  Will obtain labs next week to assess renal indices  - vitals:  not checking BP/HR, wt is stable.  Educated about importance of checking BP daily  - groins:  Left groin opened, but without signs of infection or significant bleeding (continue site care as previously instructed)  - labs:  will obtain next week  - Overall sounds to be doing well clinically.  Encouraged to increase her activity level.  Likely to order cardiac rehab at 1 month follow-up      Plan:   Left groin care:  Do not close, clean daily with  soap/water, cover with non-occlusive dressing  Cont ASA for life  Cont current medication regimen  Pt instructed to hold Valsartan if SysBP running less than 110  Ok to increase activity  f/u with Structural NP in 1 month and 1 year - ECHOs can be closer to home  f/u with Dr. Traylor (Primary Cards) as scheduled or in 6-10 weeks    I personally spent 29 minutes with this patient via phone, of which >50% of time was spent counseling and coordination of care      Milton Marquez MSN, [facility]  Acute Care Nurse Practitioner  Structural Heart / TAVR Team  [phone] (ph)  [phone] (fax)

## 2024-03-01 NOTE — PATIENT INSTRUCTIONS
- Check blood pressure twice a day  - Hold Valsartan for blood pressures less than 110, will stop Valsartan depending if blood pressures are running low or blood work comes back with elevated kidney numbers    - Ensure adequate daily PO intake    - Left groin care:  Do not close, clean daily with soap/water, cover with non-occlusive dressing    - Follow-up with Dr. Traylor and Structural Heart as scheduled

## 2024-03-04 ENCOUNTER — LAB (OUTPATIENT)
Dept: LAB | Facility: LAB | Age: 89
End: 2024-03-04
Payer: MEDICARE

## 2024-03-04 DIAGNOSIS — Z95.2 S/P TAVR (TRANSCATHETER AORTIC VALVE REPLACEMENT): ICD-10-CM

## 2024-03-04 DIAGNOSIS — I35.0 NONRHEUMATIC AORTIC (VALVE) STENOSIS: ICD-10-CM

## 2024-03-04 LAB
ANION GAP SERPL CALC-SCNC: 15 MMOL/L (ref 10–20)
BUN SERPL-MCNC: 34 MG/DL (ref 6–23)
CALCIUM SERPL-MCNC: 10.8 MG/DL (ref 8.6–10.6)
CHLORIDE SERPL-SCNC: 104 MMOL/L (ref 98–107)
CO2 SERPL-SCNC: 28 MMOL/L (ref 21–32)
CREAT SERPL-MCNC: 1.69 MG/DL (ref 0.5–1.05)
EGFRCR SERPLBLD CKD-EPI 2021: 29 ML/MIN/1.73M*2
ERYTHROCYTE [DISTWIDTH] IN BLOOD BY AUTOMATED COUNT: 14.4 % (ref 11.5–14.5)
GLUCOSE SERPL-MCNC: 103 MG/DL (ref 74–99)
HCT VFR BLD AUTO: 28.3 % (ref 36–46)
HGB BLD-MCNC: 9.1 G/DL (ref 12–16)
MCH RBC QN AUTO: 29 PG (ref 26–34)
MCHC RBC AUTO-ENTMCNC: 32.2 G/DL (ref 32–36)
MCV RBC AUTO: 90 FL (ref 80–100)
NRBC BLD-RTO: 0 /100 WBCS (ref 0–0)
PLATELET # BLD AUTO: 226 X10*3/UL (ref 150–450)
POTASSIUM SERPL-SCNC: 4.5 MMOL/L (ref 3.5–5.3)
RBC # BLD AUTO: 3.14 X10*6/UL (ref 4–5.2)
SODIUM SERPL-SCNC: 142 MMOL/L (ref 136–145)
WBC # BLD AUTO: 8.4 X10*3/UL (ref 4.4–11.3)

## 2024-03-04 PROCEDURE — 80048 BASIC METABOLIC PNL TOTAL CA: CPT

## 2024-03-04 PROCEDURE — 85027 COMPLETE CBC AUTOMATED: CPT

## 2024-03-04 PROCEDURE — 36415 COLL VENOUS BLD VENIPUNCTURE: CPT

## 2024-03-07 ENCOUNTER — OFFICE VISIT (OUTPATIENT)
Dept: CARDIOLOGY | Facility: HOSPITAL | Age: 89
End: 2024-03-07
Payer: MEDICARE

## 2024-03-07 VITALS
HEART RATE: 66 BPM | BODY MASS INDEX: 30.21 KG/M2 | WEIGHT: 160 LBS | HEIGHT: 61 IN | SYSTOLIC BLOOD PRESSURE: 119 MMHG | DIASTOLIC BLOOD PRESSURE: 72 MMHG

## 2024-03-07 DIAGNOSIS — E78.49 OTHER HYPERLIPIDEMIA: ICD-10-CM

## 2024-03-07 DIAGNOSIS — R94.31 ABNORMAL EKG: Primary | ICD-10-CM

## 2024-03-07 DIAGNOSIS — Z95.2 S/P TAVR (TRANSCATHETER AORTIC VALVE REPLACEMENT): ICD-10-CM

## 2024-03-07 DIAGNOSIS — I10 PRIMARY HYPERTENSION: ICD-10-CM

## 2024-03-07 DIAGNOSIS — D64.9 ANEMIA, UNSPECIFIED TYPE: ICD-10-CM

## 2024-03-07 PROCEDURE — 99214 OFFICE O/P EST MOD 30 MIN: CPT | Performed by: NURSE PRACTITIONER

## 2024-03-07 PROCEDURE — 1036F TOBACCO NON-USER: CPT | Performed by: NURSE PRACTITIONER

## 2024-03-07 PROCEDURE — 93005 ELECTROCARDIOGRAM TRACING: CPT | Performed by: NURSE PRACTITIONER

## 2024-03-07 PROCEDURE — 1159F MED LIST DOCD IN RCRD: CPT | Performed by: NURSE PRACTITIONER

## 2024-03-07 PROCEDURE — 3074F SYST BP LT 130 MM HG: CPT | Performed by: NURSE PRACTITIONER

## 2024-03-07 PROCEDURE — 1160F RVW MEDS BY RX/DR IN RCRD: CPT | Performed by: NURSE PRACTITIONER

## 2024-03-07 PROCEDURE — 1126F AMNT PAIN NOTED NONE PRSNT: CPT | Performed by: NURSE PRACTITIONER

## 2024-03-07 PROCEDURE — 93010 ELECTROCARDIOGRAM REPORT: CPT | Performed by: INTERNAL MEDICINE

## 2024-03-07 PROCEDURE — 1111F DSCHRG MED/CURRENT MED MERGE: CPT | Performed by: NURSE PRACTITIONER

## 2024-03-07 PROCEDURE — 3078F DIAST BP <80 MM HG: CPT | Performed by: NURSE PRACTITIONER

## 2024-03-07 ASSESSMENT — ENCOUNTER SYMPTOMS
LOSS OF SENSATION IN FEET: 0
OCCASIONAL FEELINGS OF UNSTEADINESS: 1
DEPRESSION: 0

## 2024-03-07 NOTE — PATIENT INSTRUCTIONS
Follow up with device clinic as scheduled  Follow up with TAVR team as scheduled  Check fasting lipid panel  Follow up with primary care physician regarding your anemia  Continue current cardiovascular medications  Consider cardiac rehab

## 2024-03-07 NOTE — PROGRESS NOTES
Primary Care Physician: Bashir Smith MD  Date of Visit: 03/07/2024 11:00 AM EST  Location of visit: [facility]     Chief Complaint:   Chief Complaint   Patient presents with    Follow-up    S/P TAVR        HPI / Summary:   Josee Henson is a 89 y.o. female presents for followup. Seen in collaboration with Dr. Traylor. She was hospitalized at [facility] from 2/23 to 2/27/24 for TAVR procedure. 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 Bishop. Since discharge she is overall feeling well. She has no dyspnea or chest pain with daily activity. She has occasional left ankle edema. She reports her right groin does continue to ooze slightly from puncture site. She had been monitoring her blood pressures at home over the weekend. She reports blood pressures to be elevated 137 to 160s over 50s to 60s.  Denies chest pain, dyspnea, orthopnea, pnd, lightheadedness, dizziness, syncope, palpitations, or bleeding issues.                Past Medical History:  Past Medical History:   Diagnosis Date    Aortic stenosis     Hyperlipidemia     Hypertension         Past Surgical History:  Past Surgical History:   Procedure Laterality Date    CARDIAC CATHETERIZATION N/A 2/2/2024    Procedure: Left And Right Heart Cath, With LV;  Surgeon: Pérez Traylor MD;  Location: [facility] Cardiac Cath Lab;  Service: Cardiovascular;  Laterality: N/A;  Hydration needed @ 8:30 AM    CARDIAC CATHETERIZATION N/A 2/23/2024    Procedure: TAVR (Transcatheter AV Replacement);  Surgeon: Braydon Correa MD;  Location: [place] [address] Cardiac Cath Lab;  Service: Cardiovascular;  Laterality: N/A;  Medtronic, Evolut FX 26    CARDIAC CATHETERIZATION N/A 2/23/2024    Procedure: TVP for TAVR;  Surgeon: Braydon Correa MD;  Location: [place] [address] Cardiac Cath Lab;  Service: Cardiovascular;  Laterality: N/A;    CARDIAC CATHETERIZATION N/A  2/23/2024    Procedure: TAVR (Transcatheter AV Replacement);  Surgeon: Brandi Serrano MD;  Location: [place] [address] Cardiac Cath Lab;  Service: Cardiac Surgery;  Laterality: N/A;    CARDIAC ELECTROPHYSIOLOGY PROCEDURE Left 2/26/2024    Procedure: PPM IMPLANT DUAL;  Surgeon: Marie Bishop MD;  Location: [place] [address] Cardiac Cath Lab;  Service: Electrophysiology;  Laterality: Left;  CI[employer] company will be determined by either Dr. Douglas or Dr. Bishop.    COLONOSCOPY  04/15/2014    Complete Colonoscopy    OTHER SURGICAL HISTORY  04/15/2014    Revision Of Total Knee Arthroplasty    TOTAL KNEE ARTHROPLASTY  04/15/2014    Total Knee Arthroplasty          Social History:   reports that she has never smoked. She has never used smokeless tobacco. She reports current alcohol use of about 1.0 standard drink of alcohol per week. She reports that she does not use drugs.     Family History:  family history includes Alzheimer's disease (age of onset: 83) in her mother; Diabetes (age of onset: 69) in her father; Heart attack in her father; Leukemia in her father; ovarian mass in her mother.      Allergies:  Allergies   Allergen Reactions    Tetracycline Rash and Unknown       Outpatient Medications:  Current Outpatient Medications   Medication Instructions    acetaminophen (TYLENOL EXTRA STRENGTH) 1,000 mg, oral, Every 8 hours PRN, As directed.    amoxicillin (Amoxil) 500 mg capsule TAKE 4 CAPSULES BY MOUTH 1 HOUR BEFORE PROCEDURE    aspirin (Ecotrin Low Strength) 81 mg EC tablet 1 tablet, oral, Nightly    atorvastatin (LIPITOR) 40 mg, oral, Daily    biotin 5 mg capsule 1 capsule, oral, Daily    calcium carbonate-vitamin D3 (Oscal-500) 500 mg-10 mcg (400 unit) tablet 2 tablets, oral, Nightly    denosumab (Prolia) 60 mg/mL syringe 1 mL, subcutaneous, Every 6 months    fexofenadine (ALLEGRA ALLERGY) 180 mg, oral, Daily    flaxseed oiL 1,000 mg capsule 1 capsule, oral, 2 times daily    fluticasone (Flonase) 50 mcg/actuation nasal  "spray 2 sprays, Each Nostril, Daily    levothyroxine (Synthroid, Levoxyl) 125 mcg tablet 1 tablet, oral, Daily    multivitamin with minerals tablet 1 tablet, oral, Daily    omeprazole (PriLOSEC) 20 mg DR capsule 1 capsule, oral, Daily    quiNINE (Qualaquin) 324 mg capsule 1 capsule, oral, Nightly, For leg cramps    valsartan (DIOVAN) 40 mg, oral, 2 times daily    zolpidem (AMBIEN) 5 mg, oral, Nightly PRN       Physical Exam:  Vitals:    03/07/24 1124   BP: 119/72   BP Location: Left arm   Patient Position: Sitting   BP Cuff Size: Adult   Pulse: 66   Weight: 72.6 kg (160 lb)   Height: 1.549 m (5' 1\")     Wt Readings from Last 5 Encounters:   03/07/24 72.6 kg (160 lb)   02/27/24 72.6 kg (160 lb 0.9 oz)   02/02/24 72.6 kg (160 lb)   01/08/24 72.6 kg (160 lb)   01/04/24 72.7 kg (160 lb 3.2 oz)     Body mass index is 30.23 kg/m².     GENERAL: alert, cooperative, pleasant, in no acute distress  SKIN: warm and dry. Left chest wall incision CDI CODY  NECK: Normal JVD, negative HJR  CARDIAC: Regular rate and rhythm with 2/6 early peaking systolic murmur, no rubs or gallops  CHEST: Normal respiratory efforts, lungs clear to auscultation bilaterally.  ABDOMEN: soft, nontender, nondistended  EXTREMITIES: no edema, +2 palpable RP bilaterally. Right femoral groin site +2 femoral pulse slight ooze with 2x2 loosely over site. No pain or tenderness. Left femoral +2 femoral pulse site CODY, CDI.       Last Labs:  Recent Labs     03/04/24  1146 02/27/24  1234 02/26/24  0446   WBC 8.4 8.8 7.3   HGB 9.1* 9.5* 9.8*   HCT 28.3* 31.7* 30.7*    121* 126*   MCV 90 92 91     Recent Labs     03/04/24  1146 02/27/24  1234 02/26/24  0446    140 141   K 4.5 4.3 4.2    104 105   BUN 34* 45* 45*   CREATININE 1.69* 1.90* 1.66*     CMP -  Lab Results   Component Value Date    CALCIUM 10.8 (H) 03/04/2024    PHOS 3.4 02/27/2024    PROT 6.0 (L) 10/26/2023    ALBUMIN 3.6 02/27/2024    AST 19 10/26/2023    ALT 13 10/26/2023    ALKPHOS 73 " 10/26/2023    BILITOT 0.5 10/26/2023       LIPID PANEL -   Lab Results   Component Value Date    CHOL 199 10/26/2023    HDL 75.8 10/26/2023    LDLF 81 10/27/2022    TRIG 134 10/26/2023       Lab Results   Component Value Date    HGBA1C 6.0 (H) 10/26/2023       Last Cardiology Tests:  ECG:  Obtained and reviewed EKG- V paced HR 66 underlying NSR    Echo:  Echo Results:  Transthoracic Echo (TTE) Limited With Doppler, Color And Contrast 02/24/2024    [facility], [address] [place] [address]  Tel [phone] and Fax [phone]    TRANSTHORACIC ECHOCARDIOGRAM REPORT      Patient Name:      JAIRO LEA     Reading Physician:    [id number] Napoles MD  Study Date:        2/24/2024            Ordering Provider:    [id number] ASHLEE HOWE  MRN/PID:           [id number]             Fellow:  Accession#:        [id number]         Nurse:  Date of Birth/Age: 1935 / 89 years Sonographer:          Gray Lei RDCS  Gender:            F                    Additional Staff:  Height:            152.40 cm            Admit Date:           2/23/2024  Weight:            72.58 kg             Admission Status:     Inpatient -  Routine  BSA / BMI:         1.70 m2 / 31.25      Encounter#:           [id number]  kg/m2  Department Location:  [facility]  Blood Pressure: 133 /51 mmHg    Study Type:    TRANSTHORACIC ECHO (TTE) LIMITED  Diagnosis/ICD: Presence of prosthetic heart valve-Z95.2  Indication:    S/P TAVR  CPT Code:      Echo Limited-95863; Doppler Limited-56272; Color Doppler-77814    Patient History:  Pertinent         HTN; HLD; murmur; CKD; AS s/p TAVR (26mm Evolut FX,  History:          done:2/23/24).    Study Detail: The following Echo studies were performed: 2D, M-Mode, Doppler and  color flow. Technically challenging study due to body habitus and  poor acoustic windows. Definity used as a contrast agent for  endocardial border definition. Total contrast used for  this  procedure was 2.0 mL via IV push.      PHYSICIAN INTERPRETATION:  Left Ventricle: The left ventricular systolic function is hyperdynamic, with an estimated ejection fraction of 70-75%. There are no regional wall motion abnormalities. The left ventricular cavity size is normal. Left ventricular diastolic filling was not assessed.  Left Atrium: The left atrium is normal in size.  Right Ventricle: The right ventricle is normal in size. There is normal right ventricular global systolic function.  Right Atrium: The right atrium was not well visualized.  Aortic Valve: The aortic valve appears abnormal. There is a Medtronic transcatheter aortic valve replacement, with a 29 mm reported size. Echo findings are consistent with normal aortic valve prosthesis structure and function. There is no evidence of aortic valve regurgitation. The peak instantaneous gradient of the aortic valve is 16.5 mmHg. The mean gradient of the aortic valve is 8.0 mmHg.  Mitral Valve: The mitral valve is normal in structure. There is no evidence of mitral valve regurgitation.  Tricuspid Valve: The tricuspid valve was not well visualized. Tricuspid regurgitation was not assessed.  Pulmonic Valve: The pulmonic valve is not well visualized. There is physiologic pulmonic valve regurgitation.  Pericardium: There is a trivial pericardial effusion.  Aorta: The aortic root is normal.      CONCLUSIONS:  1. Left ventricular systolic function is hyperdynamic with a 70-75% estimated ejection fraction.  2. Aortic valve appears abnormal.  3. There is a transcatheter aortic valve replacement.    QUANTITATIVE DATA SUMMARY:  2D MEASUREMENTS:  Normal Ranges:  Ao Root d:     3.10 cm    (2.0-3.7cm)  LAs:           4.40 cm    (2.7-4.0cm)  IVSd:          1.20 cm    (0.6-1.1cm)  LVPWd:         1.20 cm    (0.6-1.1cm)  LVIDd:         5.00 cm    (3.9-5.9cm)  LVIDs:         3.10 cm  LV Mass Index: 137.7 g/m2  LV % FS        38.0 %    LA VOLUME:  Normal Ranges:  LA  Volume Index: 18.8 ml/m2    LV SYSTOLIC FUNCTION BY 2D PLANIMETRY (MOD):  Normal Ranges:  EF-A4C View: 77.8 % (>=55%)  EF-A2C View: 72.2 %  EF-Biplane:  75.5 %    LV DIASTOLIC FUNCTION:  Normal Ranges:  MV Peak E:        0.56 m/s   (0.7-1.2 m/s)  MV Peak A:        0.78 m/s   (0.42-0.7 m/s)  E/A Ratio:        0.72       (1.0-2.2)  MV e'             0.06 m/s   (>8.0)  E/e' Ratio:       9.55       (<8.0)  MV DT:            275 msec   (150-240 msec)  PulmV Sys Biju:    48.00 cm/s  PulmV Galan Biju:   47.10 cm/s  PulmV S/D Biju:    1.00  PulmV A Revs Biju: 28.30 cm/s    MITRAL VALVE:  Normal Ranges:  MV DT: 275 msec (150-240msec)    AORTIC VALVE:  Normal Ranges:  AoV Vmax:                2.03 m/s  (<=1.7m/s)  AoV Peak P.5 mmHg (<20mmHg)  AoV Mean P.0 mmHg  (1.7-11.5mmHg)  LVOT Max Biju:            1.29 m/s  (<=1.1m/s)  AoV VTI:                 35.30 cm  (18-25cm)  LVOT VTI:                25.10 cm  LVOT Diameter:           1.80 cm   (1.8-2.4cm)  AoV Area, VTI:           1.81 cm2  (2.5-5.5cm2)  AoV Area,Vmax:           1.62 cm2  (2.5-4.5cm2)  AoV Dimensionless Index: 0.71      RIGHT VENTRICLE:  TAPSE: 18.2 mm  RV s'  0.16 m/s    Pulmonary Veins:  PulmV A Revs Biju: 28.30 cm/s  PulmV Galan Biju:   47.10 cm/s  PulmV S/D Biju:    1.00  PulmV Sys Biju:    48.00 cm/s      [id number] Ortiz Napoles MD  Electronically signed on 2024 at 1:31:50 PM        ** Final **      Transthoracic Echo (TTE) Limited With Doppler And Color 2024    [facility], [address] [place] [address]  Tel [phone] and Fax [phone]    TRANSTHORACIC ECHOCARDIOGRAM REPORT      Patient Name:      JAIRO JOYCE LEA     Reading Physician:    [id number] Moise Jimenez MD  Study Date:        2024            Ordering Provider:    [id number] LOPEZ JUAREZ  MRN/PID:           [id number]             Fellow:  Accession#:        [id number]         Nurse:  Date of Birth/Age: 1935 /  years  Sonographer:          Tiff Hess RDCS, FASE  Gender:            F                    Additional Staff:  Height:            154.94 cm            Admit Date:           2/23/2024  Weight:            72.58 kg             Admission Status:     Inpatient -  Routine  BSA / BMI:         1.72 m2 / 30.23      Encounter#:           [id number]  kg/m2  Department Location:  [facility]  Cath Lab  Blood Pressure: 141 /63 mmHg    Study Type:    TRANSTHORACIC ECHO (TTE) LIMITED  Diagnosis/ICD: Nonrheumatic aortic (valve) stenosis-I35.0  Indication:    TAVR Periprocedure  CPT Code:      Echo Limited-38837; Doppler Limited-06341; Color Doppler-30592    Patient History:  Pertinent History: HTN, Hyperlipidemia and Murmur. CKD, AS.    Study Detail: The following Echo studies were performed: 2D, M-Mode, Doppler and  color flow. Technically challenging study due to patient lying in  supine position and body habitus.      PHYSICIAN INTERPRETATION:  Left Ventricle: The left ventricular systolic function is normal, with an estimated ejection fraction of 70%. There are no regional wall motion abnormalities. The left ventricular cavity size is normal. Spectral Doppler shows a normal pattern of left ventricular diastolic filling. There is concentric LVH.  Left Atrium: The left atrium is normal in size.  Right Ventricle: The right ventricle is normal in size. There is normal right ventricular global systolic function.  Right Atrium: The right atrium is normal in size.  Aortic Valve: The aortic valve is probably trileaflet. There is moderate to severe aortic valve cusp calcification. The aortic valve dimensionless index is 0.32. There is no evidence of aortic valve regurgitation. The peak instantaneous gradient of the aortic valve is 39.4 mmHg. The mean gradient of the aortic valve is 22.0 mmHg.  Mitral Valve: The mitral valve is normal in structure. There is mild to moderate mitral valve regurgitation.  Tricuspid Valve: The tricuspid  valve is structurally normal. There is trace to mild tricuspid regurgitation. The Doppler estimated RVSP is mildly elevated at 35.5 mmHg.  Pulmonic Valve: The pulmonic valve is not well visualized. The pulmonic valve regurgitation was not well visualized.  Pericardium: There is no pericardial effusion noted.  Aorta: The aortic root is normal.    Post Transcatheter Aortic Valve Placement (TAVR):  The peak instantaneous gradient of the aortic valve is 12.4 mmHg. The mean gradient of the aortic valve is 5.0 mmHg. There is a Medtronic transcatheter aortic valve replacement, with a 26 mm reported size. There is no mckenna-prosthetic aortic valve regurgitation.      CONCLUSIONS:  1. Left ventricular systolic function is normal with a 70% estimated ejection fraction.  2. Poorly visualized anatomical structures due to suboptimal image quality.  3. Mild to moderate mitral valve regurgitation.  4. Mildly elevated RVSP.  5. There is moderate to severe aortic valve cusp calcification.    QUANTITATIVE DATA SUMMARY:  2D MEASUREMENTS:  Normal Ranges:  Ao Root d:     2.80 cm   (2.0-3.7cm)  LAs:           4.00 cm   (2.7-4.0cm)  IVSd:          0.80 cm   (0.6-1.1cm)  LVPWd:         0.80 cm   (0.6-1.1cm)  LVIDd:         4.90 cm   (3.9-5.9cm)  LVIDs:         3.10 cm  LV Mass Index: 76.4 g/m2  LV % FS        36.7 %    LA VOLUME:  Normal Ranges:  LA Vol A4C:        55.8 ml    (22+/-6mL/m2)  LA Vol A2C:        46.8 ml  LA Vol BP:         51.2 ml  LA Vol Index A4C:  32.5ml/m2  LA Vol Index A2C:  27.2 ml/m2  LA Vol Index BP:   29.8 ml/m2  LA Area A4C:       18.7 cm2  LA Area A2C:       17.1 cm2  LA Major Axis A4C: 5.3 cm  LA Major Axis A2C: 5.3 cm  LA Volume Index:   29.8 ml/m2    AORTA MEASUREMENTS:  Normal Ranges:  Asc Ao, d: 2.80 cm (2.1-3.4cm)    LV SYSTOLIC FUNCTION BY 2D PLANIMETRY (MOD):  Normal Ranges:  EF-A4C View: 78.6 % (>=55%)  EF-A2C View: 69.6 %  EF-Biplane:  75.1 %    LV DIASTOLIC FUNCTION:  Normal Ranges:  MV Peak E:    0.61 m/s     (0.7-1.2 m/s)  MV Peak A:    0.56 m/s    (0.42-0.7 m/s)  E/A Ratio:    1.09        (1.0-2.2)  MV e'         0.10 m/s    (>8.0)  MV lateral e' 0.12 m/s  MV medial e'  0.07 m/s  MV A Dur:     129.00 msec  E/e' Ratio:   6.45        (<8.0)  MV DT:        269 msec    (150-240 msec)    MITRAL VALVE:  Normal Ranges:  MV DT: 269 msec (150-240msec)    AORTIC VALVE:  Normal Ranges:  AoV Vmax:                          3.14 m/s  (<=1.7m/s)  AoV Vmax Post TAVR:                1.76 m/s  (<=1.7m/s)  AoV Peak P.4 mmHg (<20mmHg)  AoV Peak PG Post TAVR:             12.4 mmHg (<20mmHg)  AoV Mean P.0 mmHg (1.7-11.5mmHg)  AoV Mean PG Post TAVR:             5.0 mmHg  (1.7-11.5mmHg)  LVOT Max Biju:                      1.14 m/s  (<=1.1m/s)  LVOT Max Biju Post TAVR:            1.14 m/s  (<=1.1m/s)  AoV VTI:                           73.40 cm  (18-25cm)  AoV VTI Post TAVR:                 34.40 cm  (18-25cm)  LVOT VTI:                          23.80 cm  LVOT VTI Post TAVR:                23.80 cm  LVOT Diameter:                     1.80 cm   (1.8-2.4cm)  LVOT Diameter Post TAVR:           1.80 cm   (1.8-2.4cm)  AoV Area, VTI:                     0.83 cm2  (2.5-5.5cm2)  AoV Area, VTI Post TAVR:           1.76 cm2  (2.5-5.5cm2)  AoV Area,Vmax:                     0.92 cm2  (2.5-4.5cm2)  AoV Area,Vmax Post TAVR:           1.65 cm2  (2.5-4.5cm2)  AoV Dimensionless Index:           0.32  AoV Dimensionless Index Post TAVR: 0.69      RIGHT VENTRICLE:  TAPSE: 19.3 mm  RV s'  0.11 m/s    TRICUSPID VALVE/RVSP:  Normal Ranges:  Peak TR Velocity: 2.85 m/s  RV Syst Pressure: 35.5 mmHg (< 30mmHg)  IVC Diam:         1.14 cm      [id number] Moise Jimenez MD  Electronically signed on 2024 at 5:40:09 PM        ** Final **      Transthoracic Echo (TTE) Complete 2023    [facility], [address] [place] [address]  Tel [phone] and Fax [phone]    TRANSTHORACIC  ECHOCARDIOGRAM REPORT      Patient Name:     JAIRO LEA    Reading Physician:  [id number] Lazaro Wimlan DO  Study Date:       11/30/2023          Ordering Provider:  [id number] ALAYNA MARIE MAGALLONSH  MRN/PID:          [id number]            Fellow:  Accession#:       [id number]        Nurse:  Date of           1935 / 88      Sonographer:        Carlos Gutierrez RDMS  Birth/Age:        years  Gender:           F                   Additional Staff:   Lena Casanova RDCS  Height:           154.00 cm           Admit Date:  Weight:           74.00 kg            Admission Status:   Outpatient  BSA:              1.72 m2             Encounter#:         [id number]  Department          [facility]  Location:           Invasive  Blood Pressure: 122 /78 mmHg    Study Type:    TRANSTHORACIC ECHO (TTE) COMPLETE  Diagnosis/ICD: Nonrheumatic aortic (valve) stenosis-I35.0  Indication:    AS  CPT Code:      Echo Complete w Full Doppler-30282    Patient History:  Pertinent History: HTN.    Study Detail: The following Echo studies were performed: 2D, M-Mode, Doppler and  color flow.      PHYSICIAN INTERPRETATION:  Left Ventricle: The left ventricular systolic function is normal, with an estimated ejection fraction of 60%. There are no regional wall motion abnormalities. The left ventricular cavity size is normal. Spectral Doppler shows a pseudonormal pattern of left ventricular diastolic filling.  Left Atrium: The left atrium is upper limits of normal in size.  Right Ventricle: The right ventricle is normal in size. There is normal right ventricular global systolic function.  Right Atrium: The right atrium is normal in size.  Aortic Valve: The aortic valve is probably trileaflet. There is moderate to severe aortic valve cusp calcification. There is evidence of moderate to severe aortic valve stenosis.  There is low flow across the aortic valve, with a normal ejection fraction. The aortic valve dimensionless index is 0.23. There is no evidence  of aortic valve regurgitation. The peak instantaneous gradient of the aortic valve is 50.1 mmHg. The mean gradient of the aortic valve is 29.0 mmHg.  Mitral Valve: The mitral valve is mildly thickened. There is mild mitral valve regurgitation.  Tricuspid Valve: The tricuspid valve is structurally normal. No evidence of tricuspid regurgitation.  Pulmonic Valve: The pulmonic valve is structurally normal. There is no indication of pulmonic valve regurgitation.  Pericardium: There is no pericardial effusion noted.  Aorta: The aortic root is normal.  In comparison to the previous echocardiogram(s): Compared with study from 11/8/2022, dimensionless index 0.23 down from 0.34 suggesting more severe aortic stenosis. Clinical correlation suggested.      CONCLUSIONS:  1. Left ventricular systolic function is normal with a 60% estimated ejection fraction.  2. Spectral Doppler shows a pseudonormal pattern of left ventricular diastolic filling.  3. Moderate to severe aortic valve stenosis. pk/mn 50/29 mmHg, DI 0.23, ADITYA 0.6 suggests low flow low gradient severe type.  4. There is moderate to severe aortic valve cusp calcification.  5. Compared with study from 11/8/2022, dimensionless index 0.23 down from 0.34 suggesting more severe aortic stenosis. Clinical correlation suggested.    QUANTITATIVE DATA SUMMARY:  2D MEASUREMENTS:  Normal Ranges:  IVSd:          1.50 cm    (0.6-1.1cm)  LVPWd:         1.40 cm    (0.6-1.1cm)  LVIDd:         4.00 cm    (3.9-5.9cm)  LVIDs:         2.70 cm  LV Mass Index: 127.9 g/m2  LV % FS        32.5 %    LA VOLUME:  Normal Ranges:  LA Vol A4C:        65.0 ml    (22+/-6mL/m2)  LA Vol A2C:        55.9 ml  LA Vol BP:         63.0 ml  LA Vol Index A4C:  37.7ml/m2  LA Vol Index A2C:  32.4 ml/m2  LA Vol Index BP:   36.5 ml/m2  LA Area A4C:       19.5 cm2  LA Area A2C:       18.9 cm2  LA Major Axis A4C: 5.0 cm  LA Major Axis A2C: 5.4 cm  LA Volume Index:   36.5 ml/m2    RA VOLUME BY A/L METHOD:  Normal  Ranges:  RA Vol A4C:        35.2 ml    (8.3-19.5ml)  RA Vol Index A4C:  20.4 ml/m2  RA Area A4C:       15.3 cm2  RA Major Axis A4C: 5.7 cm    M-MODE MEASUREMENTS:  Normal Ranges:  Ao Root: 2.50 cm (2.0-3.7cm)  LAs:     5.20 cm (2.7-4.0cm)    AORTA MEASUREMENTS:  Normal Ranges:  Ao Sinus, d: 2.57 cm (2.1-3.5cm)  Ao STJ, d:   1.83 cm (1.7-3.4cm)  Asc Ao, d:   3.20 cm (2.1-3.4cm)    LV SYSTOLIC FUNCTION BY 2D PLANIMETRY (MOD):  Normal Ranges:  EF-A4C View: 66.7 % (>=55%)  EF-A2C View: 69.9 %  EF-Biplane:  69.3 %    LV DIASTOLIC FUNCTION:  Normal Ranges:  MV Peak E:        0.66 m/s    (0.7-1.2 m/s)  MV Peak A:        0.71 m/s    (0.42-0.7 m/s)  E/A Ratio:        0.92        (1.0-2.2)  MV lateral e'     0.13 m/s  MV medial e'      0.06 m/s  E/e' Ratio:       5.00        (<8.0)  PulmV Sys Biju:    60.20 cm/s  PulmV Galan Biju:   65.20 cm/s  PulmV S/D Biju:    0.90  PulmV A Revs Biju: 20.70 cm/s  PulmV A Revs Dur: 109.00 msec    MITRAL VALVE:  Normal Ranges:  MV DT: 387 msec (150-240msec)    AORTIC VALVE:  Normal Ranges:  AoV Vmax:                3.54 m/s  (<=1.7m/s)  AoV Peak P.1 mmHg (<20mmHg)  AoV Mean P.0 mmHg (1.7-11.5mmHg)  LVOT Max Biju:            0.94 m/s  (<=1.1m/s)  AoV VTI:                 90.80 cm  (18-25cm)  LVOT VTI:                21.20 cm  LVOT Diameter:           1.74 cm   (1.8-2.4cm)  AoV Area, VTI:           0.56 cm2  (2.5-5.5cm2)  AoV Area,Vmax:           0.63 cm2  (2.5-4.5cm2)  AoV Dimensionless Index: 0.23      RIGHT VENTRICLE:  RV Basal 3.14 cm  RV Mid   2.37 cm  RV Major 4.8 cm  TAPSE:   23.3 mm  RV s'    0.13 m/s    TRICUSPID VALVE/RVSP:  Normal Ranges:  Peak TR Velocity: 2.88 m/s  Est. RA Pressure: 3 mmHg  RV Syst Pressure: 36.2 mmHg (< 30mmHg)  IVC Diam:         1.06 cm    PULMONIC VALVE:  Normal Ranges:  PV Max Biju: 1.8 m/s   (0.6-0.9m/s)  PV Max P.7 mmHg    Pulmonary Veins:  PulmV A Revs Dur: 109.00 msec  PulmV A Revs Biju: 20.70 cm/s  PulmV Galan Biju:   65.20  cm/s  PulmV S/D Biju:    0.90  PulmV Sys Biju:    60.20 cm/s      [id number] Lazaro Stovall   Electronically signed on 11/30/2023 at 5:12:13 PM        ** Final **         Cath:  2/2/24  Coronary Lesion Summary:  Vessel            Stenosis      Vessel Segment  Left Main    10 to 30% stenosis     distal  LAD          10 to 30% stenosis    proximal  1st Diagonal    80% stenosis       proximal  Circumflex      30% stenosis        ostial  RCA          10 to 30% stenosis proximal to mid    CONCLUSIONS:   1. 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      Cardiac Imaging:  Electrophysiology procedure  Dual chamber pacemaker implantation    Procedures:  Implant of dual chamber PPM ([id number]),     Patient history:  Please refer to the detailed history and physical on the patient's medical   chart.     Procedure narrative:  The patient was in the fasting state. A grounding pad was placed. The   patient was set up for continuous monitoring of surface 12 lead ECG and   pulse oximetry. Blood pressure was monitored. The procedure was performed   under IV conscious sedation supplemented with intermittent moderate   sedation. The Left upper chest was prepped and draped in the usual sterile   fashion. Local anesthesia: After preoperative IV antibiotic was completely   infused, subcutaneous tissues just medial to the Left deltopectoral area,   were infiltrated with  0.25% bupivicaine 30 cc  for local anesthesia.   An incision was made in the infraclavicular region which was extended to   the left prepectoral fascia using blunt dissection. A pulse generator   pocket was created.  A venogram was obtained using 10cc of contrast via the left arm peripheral   IV. The images were used to guide access. Under Fluoroscopic a 18G Cook   needle was used to access the Left Axillary vein using Seldinger   technique.    A 7F sheath was placed over of the guidewire. The RV pacing lead was then   advanced to the heart via fluoroscopic guidance. The  ventricle was mapped,   and the lead was fixed to the right ventricular  apical septum. . After   lead placement, appropriate sensing and thresholds were obtained. The   sheath was peeled away.  A second 7F sheath was advanced over another guidewire, and the dilator   and guidewire were removed. A RA lead was advanced to the RAA.    Appropriate sensing and thresholds were obtained.   The leads were sutured in place to the pectoralis muscle using x3.0  non   absorbable  ties. Lead measurements were obtained.    RA Medtronic 5076-45 [id number] 125V @ 0.4 msec; 532 ohms, 2.0 mV  RV medtronic 5076-52 [id number] 0.5V @ 0.4 msec; 1064; 6.8 mV    No diaphragmatic pacing at 8 V and .4 msec in either the RA or RV.     A dual chamber pacemaker pulse generator  Medtronic Selina XT DR MRI    [id number] was attached to the leads and implanted.  The device was interrogated and its parameters recorded; telemetered   electrograms and pacing and sensing thresholds were measured.   The pocket was flushed with Vancomycin and saline solution. Wound   hemostasis was obtained with electrocautery.. The wound was closed in   three layers using #0 and #3-0 Vicryl. The skin was approximated with   subcuticular suture and skin adhesive. The incision covered with a sterile   dressing. Manual pressure was applied.    The temporary transvenous pacemaker was removed under fluoroscopic   guidance. Hemostasis was obtained with pressure.     Final Implant Settings:  : Medtronic  Device: Dual chamber pacemaker     Mode: DDD Lower rate 60bpm   Upper track rate 120bpm      Post implant device parameters scanned into the system    Summary:  Successful implantation of a Medtronic left sided Dual chamber pacemaker    See complete procedural log and parameters.   Structural heart procedure     [facility], Cath Lab, [address] [place] [address]    Cardiovascular Catheterization Report    Patient Name:       JAIRO LEA     Performing Physician:  [id number] Gela Correa MD  Study Date:        2/23/2024            Verifying Physician:   [id number] Gela Correa MD  MRN/PID:           [id number]             Cardiologist/Co-scrub:  Accession#:        [id number]         Ordering Physician:    [id number] GELA CORREA  Date of Birth/Age: 1935 / 89 years Fellow:                [id number] Kristin Chambers MD  Gender:            F                    Fellow:  Admit Date:                             Surgeon:               oCdy Khanna MD  Encounter#:        [id number]       Study: DORIE - Transcatheter Aortic Valve Implantation       Indications:  Severe aortic stenosis.     Transcatheter Aortic Valve Replacement (TAVR):  The right femoral artery was accessed using the transfemoral method. A 6 Fr sheath was inserted followed by the deployment of 2 Proglides. 14 F Sentrant. The left femoral artery was accessed percutaneously and a 6 Romanian contralateral sheath was placed. A 7 Romanian temporary pacemaker was inserted through the right jugular vein and advanced to the right ventricular apex. Adequate pacing thresholds were obtained. Evolut FX 26 mm valve was successfully deployed under rapid ventricular pacing at 160 BPM. Transthoracic echo performed post valve deployment revealed no mitral insufficiency and no central aortic insufficiency and trace/trivial paravalvular aortic insufficiency. No device related events. No bleeding events occurred during the procedure. No vascular access complications were revealed. Access site was closed using 2 ProGlide devices. Hemostasis was achieved in the left femoral artery  using a ProGlide device. Temporary pacing wire was sutured in place.       Hemo Personnel:  +-----------------------+---------+  Name                   Duty       +-----------------------+---------+  Braydon Correa MD 1  +-----------------------+---------+       Hemodynamic Pressures:     +----+-------------------+---------+------------+-------------+------+---------+  Site     Date Time       Phase    Systolic    Diastolic    ED  Mean mmHg                           Name       mmHg        mmHg      mmHg            +----+-------------------+---------+------------+-------------+------+---------+    AO  2/23/2024 1:02:54     Rest         153           54             89                       PM                                                   +----+-------------------+---------+------------+-------------+------+---------+   RFA  2/23/2024 1:10:33     Rest         181           61            103                       PM                                                   +----+-------------------+---------+------------+-------------+------+---------+    LV  2/23/2024 1:21:14     Rest         184            4    14                                PM                                                   +----+-------------------+---------+------------+-------------+------+---------+   RFA  2/23/2024 1:21:14     Rest         170           54             87                       PM                                                   +----+-------------------+---------+------------+-------------+------+---------+    AO  2/23/2024 1:34:33     Rest         128           44             69                       PM                                                   +----+-------------------+---------+------------+-------------+------+---------+    LV  2/23/2024 1:34:33     Rest         130            1    16                                 PM                                                   +----+-------------------+---------+------------+-------------+------+---------+    AO  2/23/2024 1:34:40     Rest         136           47             76                       PM                                                   +----+-------------------+---------+------------+-------------+------+---------+    LV  2/23/2024 1:34:40     Rest         136            2    19                                PM                                                   +----+-------------------+---------+------------+-------------+------+---------+    AO  2/23/2024 1:34:46     Rest         138           47             75                       PM                                                   +----+-------------------+---------+------------+-------------+------+---------+   LVp  2/23/2024 1:34:46     Rest         106           20                                      PM                                                   +----+-------------------+---------+------------+-------------+------+---------+    AO  2/23/2024 1:34:53     Rest           0            0             42                       PM                                                   +----+-------------------+---------+------------+-------------+------+---------+   AOamado  2/23/2024 1:34:53     Rest         138           41             75                       PM                                                   +----+-------------------+---------+------------+-------------+------+---------+       Complications:  No in-lab complications observed. No vascular access complications were revealed. No bleeding events occurred during the procedure. No device related events.     Cardiac Cath Post Procedure Notes:  Post Procedure Diagnosis: Successful TAVR with a 26 mm Evolut FX.  Blood Loss:                Estimated blood loss during the procedure was 20 mls.  Specimens Removed:        Number of specimen(s) removed: none.    ____________________________________________________________________________________  CONCLUSIONS:   1. Transcatheter aortic valve replacement with successful implantation of Evolut FX 26 mm valve.   2. Patient was enrolled in a research study and data was included in the TVT Registry.    ICD 10 Codes:  Nonrheumatic aortic (valve) stenosis-I35.0     CPT Codes:  DORIE Perc,femoral-26965.62     [id number] Braydon Correa MD  Performing Physician  Electronically signed by [id number] Braydon Correa MD on 2/26/2024 at 10:32:00  AM         ** Final **        Assessment/Plan   Problem List Items Addressed This Visit          Cardiac and Vasculature    Hyperlipidemia    Relevant Orders    Lipid panel    Hypertension    S/P TAVR (transcatheter aortic valve replacement)    Relevant Orders    ECG 12 lead (Clinic Performed) (Completed)     Other Visit Diagnoses       Abnormal EKG    -  Primary          In summary Ms. Henson is a pleasant 89-year-old white female with a past medical history significant for aortic stenosis s/p TAVR with subsequent CHB s/p pacemaker, hypertension, hyperlipidemia, chronic kidney disease, glucose intolerance, and obesity. She was hospitalized at [facility] from 2/23 to 2/27/24 for TAVR procedure. 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 Bishop. Since discharge she is overall feeling well. She has no dyspnea or chest pain with daily activity. Her blood pressure today is controlled. Her blood pressures at home are elevated. I am not certain her blood pressure cuff is reading accurately. I instructed her to continue current dose of Valsartan. Valsartan had been decreased post TAVR.  I did reach out to TAVR team nurse practitioner Milton as she does continue to have a  slight ooze from her right groin site and hemoglobin has down trended, but remains overall stable. He is going to follow up with patient. I have also instructed her to follow up with primary care physician. I have also recommended cardiac rehab. She will consider and let me know if she decides to do it. She will follow up with device clinic as scheduled. I have ordered fasting lipid panel. She will continue current cardiovascular medications. She will follow up in July.     Addendum: 1/11/23- spoke to patient today. Reports oozing stopped for the most part other than when her undergarments rub on the site. Recheck CBC to ensure hemoglobin does not continue to drop. Has PCP appointment on Wednesday.          Orders:  No orders of the defined types were placed in this encounter.     Followup Appts:  Future Appointments   Date Time Provider Department Center   3/18/2024  1:50 PM MIN ECHO [id number] [facility]   3/22/2024 10:00 AM  JAMARI [id number] CARD1 [id number] [facility]   4/2/2024  8:30 AM [facility] MP [facility] [id number] [facility] Cent   6/13/2024 11:00 AM Leonila Bocanegra MD [id number] [facility]   7/9/2024  1:00 PM INF 01 YASHIRA Casie [facility]   2/21/2025  8:00 AM  JAMARI [id number] CARD1 [id number] [facility]           ____________________________________________________________  Elisa Hahn, APRN-CNP  [facility]  [facility]

## 2024-03-12 ENCOUNTER — LAB (OUTPATIENT)
Dept: LAB | Facility: LAB | Age: 89
End: 2024-03-12
Payer: MEDICARE

## 2024-03-12 DIAGNOSIS — D64.9 ANEMIA, UNSPECIFIED TYPE: ICD-10-CM

## 2024-03-12 LAB
ERYTHROCYTE [DISTWIDTH] IN BLOOD BY AUTOMATED COUNT: 14.8 % (ref 11.5–14.5)
HCT VFR BLD AUTO: 31.2 % (ref 36–46)
HGB BLD-MCNC: 9.5 G/DL (ref 12–16)
MCH RBC QN AUTO: 27.5 PG (ref 26–34)
MCHC RBC AUTO-ENTMCNC: 30.4 G/DL (ref 32–36)
MCV RBC AUTO: 90 FL (ref 80–100)
NRBC BLD-RTO: 0 /100 WBCS (ref 0–0)
PLATELET # BLD AUTO: 239 X10*3/UL (ref 150–450)
RBC # BLD AUTO: 3.45 X10*6/UL (ref 4–5.2)
WBC # BLD AUTO: 6.7 X10*3/UL (ref 4.4–11.3)

## 2024-03-12 PROCEDURE — 36415 COLL VENOUS BLD VENIPUNCTURE: CPT

## 2024-03-12 PROCEDURE — 85027 COMPLETE CBC AUTOMATED: CPT

## 2024-03-13 ENCOUNTER — OFFICE VISIT (OUTPATIENT)
Dept: PRIMARY CARE | Facility: CLINIC | Age: 89
End: 2024-03-13
Payer: MEDICARE

## 2024-03-13 VITALS — OXYGEN SATURATION: 98 % | HEART RATE: 60 BPM | SYSTOLIC BLOOD PRESSURE: 140 MMHG | DIASTOLIC BLOOD PRESSURE: 62 MMHG

## 2024-03-13 DIAGNOSIS — Z95.2 S/P TAVR (TRANSCATHETER AORTIC VALVE REPLACEMENT): Primary | ICD-10-CM

## 2024-03-13 DIAGNOSIS — M70.62 TROCHANTERIC BURSITIS OF LEFT HIP: ICD-10-CM

## 2024-03-13 DIAGNOSIS — M19.032 PRIMARY OSTEOARTHRITIS OF LEFT WRIST: ICD-10-CM

## 2024-03-13 PROCEDURE — 1036F TOBACCO NON-USER: CPT | Performed by: INTERNAL MEDICINE

## 2024-03-13 PROCEDURE — 99214 OFFICE O/P EST MOD 30 MIN: CPT | Performed by: INTERNAL MEDICINE

## 2024-03-13 PROCEDURE — 1159F MED LIST DOCD IN RCRD: CPT | Performed by: INTERNAL MEDICINE

## 2024-03-13 PROCEDURE — 1111F DSCHRG MED/CURRENT MED MERGE: CPT | Performed by: INTERNAL MEDICINE

## 2024-03-13 PROCEDURE — 3077F SYST BP >= 140 MM HG: CPT | Performed by: INTERNAL MEDICINE

## 2024-03-13 PROCEDURE — 1160F RVW MEDS BY RX/DR IN RCRD: CPT | Performed by: INTERNAL MEDICINE

## 2024-03-13 PROCEDURE — 3078F DIAST BP <80 MM HG: CPT | Performed by: INTERNAL MEDICINE

## 2024-03-13 NOTE — ASSESSMENT & PLAN NOTE
Osteoarthritis of the left base of the thumb will have her use Voltaren gel and start getting a wrist splint to use at nighttime.  This gave her good relief in the past.  If she does not get good relief we can refer her to occupational therapy.

## 2024-03-13 NOTE — PROGRESS NOTES
Wrist vavle L legSubjective   Patient ID: Josee Henson is a 89 y.o. female who presents for No chief complaint on file..    Patient is here for follow-up.  She is now 3 weeks status post successful TAVR.  She also had a transvenous pacemaker placed due to post procedure bradycardia.  She tolerated both those procedures extremely well and has made a very good recovery.  She still feels a little bit lethargic and weak.  CBC done 2 days ago did show some improvement in hemoglobin.  She is not taking any iron supplements.  She also continues to have pain in her left wrist.  She has osteoarthritis there.  She had similar symptoms on the right side a number of years ago she was treated with Voltaren gel and a neutral wrist splint and had fairly good control of her symptoms.  She has not used either the dull care and or the splint on the left.  She also continues to struggle with some left hip discomfort which is chronic and unchanged.         Review of Systems    Objective   /62   Pulse 60   SpO2 98%     Physical Exam  Cardiovascular:      Rate and Rhythm: Normal rate and regular rhythm.      Heart sounds: No murmur heard.     Comments: Artificial sounding heart sounds but no evidence of murmur.  Pulmonary:      Breath sounds: Normal breath sounds.   Musculoskeletal:      Right lower leg: No edema.      Left lower leg: No edema.      Comments: Arthritis and pain with palpation at the base of the left thumb.         Assessment/Plan   Problem List Items Addressed This Visit             ICD-10-CM    Osteoarthritis M19.90     Osteoarthritis of the left base of the thumb will have her use Voltaren gel and start getting a wrist splint to use at nighttime.  This gave her good relief in the past.  If she does not get good relief we can refer her to occupational therapy.         Bursitis of left hip M70.72     She will continue to ambulate is much as possible I did recommend she start cardiac rehab which will also give  her improved exercise and range of motion.         S/P TAVR (transcatheter aortic valve replacement) - Primary Z95.2     Patient doing extremely well postprocedure.  Will have her start taking ferrous gluconate which she has taken in the past with good results.  She will return in 30 days for follow-up and repeat CBC at that time.  She continues with cardiology follow-up for both her valve and her pacemaker.

## 2024-03-13 NOTE — ASSESSMENT & PLAN NOTE
Patient doing extremely well postprocedure.  Will have her start taking ferrous gluconate which she has taken in the past with good results.  She will return in 30 days for follow-up and repeat CBC at that time.  She continues with cardiology follow-up for both her valve and her pacemaker.

## 2024-03-13 NOTE — ASSESSMENT & PLAN NOTE
She will continue to ambulate is much as possible I did recommend she start cardiac rehab which will also give her improved exercise and range of motion.

## 2024-03-15 ENCOUNTER — APPOINTMENT (OUTPATIENT)
Dept: CARDIOLOGY | Facility: HOSPITAL | Age: 89
End: 2024-03-15
Payer: MEDICARE

## 2024-03-15 ENCOUNTER — TELEPHONE (OUTPATIENT)
Dept: CARDIOLOGY | Facility: HOSPITAL | Age: 89
End: 2024-03-15
Payer: MEDICARE

## 2024-03-15 NOTE — TELEPHONE ENCOUNTER
"Patient called confused about Atorvastatin dosage. She has been taking 10mg since being discharged from the hospital. Instructed her to take the 40mg Dosage NP reviewed and recommended by NICKI Hahn at last visit.     She also states her groin wound site now has \"a lump.\" Denies pain and heat. Instructed her to call her surgeons office to see if further assessment or intervention is needed.   "

## 2024-03-18 ENCOUNTER — HOSPITAL ENCOUNTER (OUTPATIENT)
Dept: CARDIOLOGY | Facility: CLINIC | Age: 89
Discharge: HOME | End: 2024-03-18
Payer: MEDICARE

## 2024-03-18 DIAGNOSIS — Z95.2 S/P TAVR (TRANSCATHETER AORTIC VALVE REPLACEMENT): ICD-10-CM

## 2024-03-18 LAB
AORTIC VALVE MEAN GRADIENT: 5.8 MMHG
AORTIC VALVE PEAK VELOCITY: 1.83 M/S
AV PEAK GRADIENT: 13.4 MMHG
AVA (PEAK VEL): 1.01 CM2
AVA (VTI): 1.31 CM2
EJECTION FRACTION APICAL 4 CHAMBER: 59.2
EJECTION FRACTION: 66 %
LEFT ATRIUM VOLUME AREA LENGTH INDEX BSA: 37.4 ML/M2
LEFT VENTRICLE INTERNAL DIMENSION DIASTOLE: 4.71 CM (ref 3.5–6)
LEFT VENTRICULAR OUTFLOW TRACT DIAMETER: 1.65 CM
MITRAL VALVE E/A RATIO: 0.73
MITRAL VALVE E/E' RATIO: 9.63
RIGHT VENTRICLE FREE WALL PEAK S': 11 CM/S
RIGHT VENTRICLE PEAK SYSTOLIC PRESSURE: 35.4 MMHG

## 2024-03-18 PROCEDURE — 93306 TTE W/DOPPLER COMPLETE: CPT | Performed by: INTERNAL MEDICINE

## 2024-03-18 PROCEDURE — 93306 TTE W/DOPPLER COMPLETE: CPT

## 2024-03-19 LAB
ATRIAL RATE: 66 BPM
P AXIS: 42 DEGREES
P OFFSET: 198 MS
P ONSET: 143 MS
PR INTERVAL: 170 MS
Q ONSET: 228 MS
QRS COUNT: 11 BEATS
QRS DURATION: 124 MS
QT INTERVAL: 402 MS
QTC CALCULATION(BAZETT): 421 MS
QTC FREDERICIA: 415 MS
R AXIS: -32 DEGREES
T AXIS: 3 DEGREES
T OFFSET: 429 MS
VENTRICULAR RATE: 66 BPM

## 2024-03-22 ENCOUNTER — TELEMEDICINE (OUTPATIENT)
Dept: CARDIOLOGY | Facility: HOSPITAL | Age: 89
End: 2024-03-22
Payer: MEDICARE

## 2024-03-22 DIAGNOSIS — Z95.2 S/P TAVR (TRANSCATHETER AORTIC VALVE REPLACEMENT): Primary | ICD-10-CM

## 2024-03-22 PROCEDURE — 1159F MED LIST DOCD IN RCRD: CPT | Performed by: NURSE PRACTITIONER

## 2024-03-22 PROCEDURE — 1111F DSCHRG MED/CURRENT MED MERGE: CPT | Performed by: NURSE PRACTITIONER

## 2024-03-22 PROCEDURE — 99214 OFFICE O/P EST MOD 30 MIN: CPT | Performed by: NURSE PRACTITIONER

## 2024-03-22 PROCEDURE — 1036F TOBACCO NON-USER: CPT | Performed by: NURSE PRACTITIONER

## 2024-03-22 PROCEDURE — 1160F RVW MEDS BY RX/DR IN RCRD: CPT | Performed by: NURSE PRACTITIONER

## 2024-03-22 NOTE — PROGRESS NOTES
Structural Heart Follow up visit      Josee Henson is a 89 y.o.  female. s/p TAVR Evolut FX 26mm via B/L femoral artery (Right primary) on 2/23/24 presents for 1 mo follow up      denies SOB,MEDELLIN, fatigue  Recent Hospitalizations  No    patient with   Past Medical History:   Diagnosis Date    Aortic stenosis     Hyperlipidemia     Hypertension        Results for orders placed or performed during the hospital encounter of 03/18/24 (from the past 96 hour(s))   Transthoracic echo (TTE) complete   Result Value Ref Range    AV pk jesusita 1.83 m/s    AV mn grad 5.8 mmHg    LVOT diam 1.65 cm    LV biplane EF 66 %    MV avg E/e' ratio 9.63     MV E/A ratio 0.73     LA vol index A/L 37.4 ml/m2    RV free wall pk S' 11.00 cm/s    LVIDd 4.71 cm    RVSP 35.4 mmHg    Aortic Valve Area by Continuity of VTI 1.31 cm2    Aortic Valve Area by Continuity of Peak Velocity 1.01 cm2    AV pk grad 13.4 mmHg    LV A4C EF 59.2         Transthoracic echo (TTE) complete    Result Date: 3/18/2024    [facility], [address] [place] [address]                      Tel [phone] and Fax [phone] TRANSTHORACIC ECHOCARDIOGRAM REPORT  Patient Name:      JOSEE HENSON     Reading Physician:    [id number] Mirtha Pettit MD Study Date:        3/18/2024            Ordering Provider:    [id number] LOPEZ JUAREZ MRN/PID:           [id number]             Fellow: Accession#:        [id number]         Nurse: Date of Birth/Age: 1935 / 89 years Sonographer:          Uzma Chou RDCS Gender:            F                    Additional Staff: Height:            154.94 cm            Admit Date: Weight:            76.66 kg             Admission Status:     Outpatient BSA / BMI:         1.76 m2 / 31.93      Encounter#:           [id number]                     kg/m2                                         Department Location:  [facility]                                                               Echo Lab Blood Pressure: 140 /62 mmHg Study Type:    TRANSTHORACIC ECHO (TTE) COMPLETE Diagnosis/ICD: Presence of prosthetic heart valve-Z95.2 Indication:    S/P TAVR CPT Code:      Echo Complete w Full Doppler-[id number] Patient History: Pacer/Defib:       Permanent pacemaker Pertinent History: HTN and Hyperlipidemia. S/P TAVR 26mm Evolut on 2/23/24. Study Detail: The following Echo studies were performed: 2D, M-Mode, Doppler and               color flow. Technically challenging study due to body habitus.  PHYSICIAN INTERPRETATION: Left Ventricle: The left ventricular systolic function is normal, with an estimated ejection fraction of 65-70%. There are no regional wall motion abnormalities. The left ventricular cavity size is normal. Spectral Doppler shows an impaired relaxation pattern of left ventricular diastolic filling. Left Atrium: The left atrium is mildly dilated. Right Ventricle: The right ventricle is normal in size. There is normal right ventricular global systolic function. A device is visualized in the right ventricle. Right Atrium: The right atrium is normal in size. There is a device visualized in the right atrium. Aortic Valve: There is a prosthetic aortic valve present. There is a Medtronic transcatheter aortic valve replacement, with a 29 mm reported size. There is mild aortic valve regurgitation. The peak instantaneous gradient of the aortic valve is 13.4 mmHg. The mean gradient of the aortic valve is 5.8 mmHg. S/p 29mm Medtronic Evolut TAVR with gradients of 13.4/5.8mmHg with mild perivalvular AI. Mitral Valve: The mitral valve is normal in structure. There is mild mitral valve regurgitation which is centrally directed. Tricuspid Valve: The tricuspid valve is structurally normal. There is mild tricuspid regurgitation. The Doppler estimated RVSP is  slightly elevated at 35.4 mmHg. Pulmonic Valve: The pulmonic valve is not well visualized. The pulmonic valve regurgitation was not well visualized. Pericardium: There is no pericardial effusion noted. Aorta: The aortic root is normal. Systemic Veins: The inferior vena cava appears to be of normal size. There is IVC inspiratory collapse greater than 50%. In comparison to the previous echocardiogram(s): Compared with the prior exam from 2/24/2024 the prior TAVR gradients were 16.5/8mmg wtih trivial AI at that time. The AI was better seen today. Still with preserved LV systolic function.  CONCLUSIONS:  1. Left ventricular systolic function is normal with a 65-70% estimated ejection fraction.  2. Spectral Doppler shows an impaired relaxation pattern of left ventricular diastolic filling.  3. Mild mitral valve regurgitation.  4. Slightly elevated RVSP.  5. S/p 29mm Medtronic Evolut TAVR with gradients of 13.4/5.8mmHg with mild perivalvular AI.  6. There is a transcatheter aortic valve replacement.  7. Mild aortic valve regurgitation.  8. Compared with the prior exam from 2/24/2024 the prior TAVR gradients were 16.5/8mmg wtih trivial AI at that time. The AI was better seen today. Still with preserved LV systolic function. QUANTITATIVE DATA SUMMARY: 2D MEASUREMENTS:                          Normal Ranges: LAs:           5.22 cm   (2.7-4.0cm) RVIDd:         2.52 cm   (0.9-3.6cm) IVSd:          0.89 cm   (0.6-1.1cm) LVPWd:         0.80 cm   (0.6-1.1cm) LVIDd:         4.71 cm   (3.9-5.9cm) LVIDs:         3.53 cm LV Mass Index: 75.0 g/m2 LV % FS        25.0 % LA VOLUME:                              Normal Ranges: LA Vol A4C:       67.3 ml    (22+/-6mL/m2) LA Vol A2C:       63.4 ml LA Vol BP:        65.7 ml LA Vol Index A4C: 38.2 ml/m2 LA Vol Index A2C: 36.0 ml/m2 LA Vol Index BP:  37.4 ml/m2 LA Volume Index:  37.3 ml/m2 LA Vol A4C:       61.1 ml LA Vol A2C:       59.4 ml RA VOLUME BY A/L METHOD:                       Normal  Ranges: RA Area A4C: 17.0 cm2 AORTA MEASUREMENTS:                    Normal Ranges: Asc Ao, d: 3.30 cm (2.1-3.4cm) LV SYSTOLIC FUNCTION BY 2D PLANIMETRY (MOD):                     Normal Ranges: EF-A4C View: 59.2 % (>=55%) EF-A2C View: 70.6 % EF-Biplane:  66.3 % LV DIASTOLIC FUNCTION:                               Normal Ranges: MV Peak E:        0.58 m/s    (0.7-1.2 m/s) MV Peak A:        0.79 m/s    (0.42-0.7 m/s) E/A Ratio:        0.73        (1.0-2.2) MV e'             0.06 m/s    (>8.0) MV A Dur:         155.71 msec E/e' Ratio:       9.63        (<8.0) a'                0.08 m/s PulmV Sys Biju:    33.06 cm/s PulmV Galan Biju:   36.86 cm/s PulmV S/D Biju:    0.90 PulmV A Revs Biju: 21.92 cm/s PulmV A Revs Dur: 162.63 msec MITRAL VALVE:                 Normal Ranges: MV DT: 302 msec (150-240msec) AORTIC VALVE:                                    Normal Ranges: AoV Vmax:                1.83 m/s  (<=1.7m/s) AoV Peak P.4 mmHg (<20mmHg) AoV Mean P.8 mmHg  (1.7-11.5mmHg) LVOT Max Biju:            0.87 m/s  (<=1.1m/s) AoV VTI:                 30.50 cm  (18-25cm) LVOT VTI:                18.85 cm LVOT Diameter:           1.65 cm   (1.8-2.4cm) AoV Area, VTI:           1.31 cm2  (2.5-5.5cm2) AoV Area,Vmax:           1.01 cm2  (2.5-4.5cm2) AoV Dimensionless Index: 0.62  RIGHT VENTRICLE: RV Basal 3.30 cm RV Mid   2.50 cm RV Major 7.3 cm RV s'    0.11 m/s TRICUSPID VALVE/RVSP:                             Normal Ranges: Peak TR Velocity: 2.85 m/s RV Syst Pressure: 35.4 mmHg (< 30mmHg) IVC Diam:         1.20 cm PULMONIC VALVE:                      Normal Ranges: PV Max Biju: 1.4 m/s  (0.6-0.9m/s) PV Max P.6 mmHg Pulmonary Veins: PulmV A Revs Dur: 162.63 msec PulmV A Revs Biju: 21.92 cm/s PulmV Galan Biju:   36.86 cm/s PulmV S/D Biju:    0.90 PulmV Sys Biju:    33.06 cm/s AORTA: Asc Ao Diam 3.25 cm  [id number] Mirtha Pettit MD Electronically signed on 3/18/2024 at 3:31:42 PM  ** Final **     Transthoracic Echo  (TTE) Limited    Result Date: 2/24/2024   [facility], [address] [place] [address]                Tel [phone] and Fax [phone] TRANSTHORACIC ECHOCARDIOGRAM REPORT  Patient Name:      JAIRO LEA     Fan Physician:    [id number] Napoles MD Study Date:        2/24/2024            Ordering Provider:    [id number] ASHLEE HOWE MRN/PID:           [id number]             Fellow: Accession#:        [id number]         Nurse: Date of Birth/Age: 1935 / 89 years Sonographer:          Gray Lei RDCS Gender:            F                    Additional Staff: Height:            152.40 cm            Admit Date:           2/23/2024 Weight:            72.58 kg             Admission Status:     Inpatient -                                                               Routine BSA / BMI:         1.70 m2 / 31.25      Encounter#:           [id number]                    kg/m2                                         Department Location:  [facility] Blood Pressure: 133 /51 mmHg Study Type:    TRANSTHORACIC ECHO (TTE) LIMITED Diagnosis/ICD: Presence of prosthetic heart valve-Z95.2 Indication:    S/P TAVR CPT Code:      Echo Limited-93957; Doppler Limited-72803; Color Doppler-26067 Patient History: Pertinent         HTN; HLD; murmur; CKD; AS s/p TAVR (26mm Evolut FX, History:          done:2/23/24). Study Detail: The following Echo studies were performed: 2D, M-Mode, Doppler and               color flow. Technically challenging study due to body habitus and               poor acoustic windows. Definity used as a contrast agent for               endocardial border definition. Total contrast used for this               procedure was 2.0 mL via IV push.  PHYSICIAN INTERPRETATION: Left Ventricle: The left  ventricular systolic function is hyperdynamic, with an estimated ejection fraction of 70-75%. There are no regional wall motion abnormalities. The left ventricular cavity size is normal. Left ventricular diastolic filling was not assessed. Left Atrium: The left atrium is normal in size. Right Ventricle: The right ventricle is normal in size. There is normal right ventricular global systolic function. Right Atrium: The right atrium was not well visualized. Aortic Valve: The aortic valve appears abnormal. There is a Medtronic transcatheter aortic valve replacement, with a 29 mm reported size. Echo findings are consistent with normal aortic valve prosthesis structure and function. There is no evidence of aortic valve regurgitation. The peak instantaneous gradient of the aortic valve is 16.5 mmHg. The mean gradient of the aortic valve is 8.0 mmHg. Mitral Valve: The mitral valve is normal in structure. There is no evidence of mitral valve regurgitation. Tricuspid Valve: The tricuspid valve was not well visualized. Tricuspid regurgitation was not assessed. Pulmonic Valve: The pulmonic valve is not well visualized. There is physiologic pulmonic valve regurgitation. Pericardium: There is a trivial pericardial effusion. Aorta: The aortic root is normal.  CONCLUSIONS:  1. Left ventricular systolic function is hyperdynamic with a 70-75% estimated ejection fraction.  2. Aortic valve appears abnormal.  3. There is a transcatheter aortic valve replacement. QUANTITATIVE DATA SUMMARY: 2D MEASUREMENTS:                           Normal Ranges: Ao Root d:     3.10 cm    (2.0-3.7cm) LAs:           4.40 cm    (2.7-4.0cm) IVSd:          1.20 cm    (0.6-1.1cm) LVPWd:         1.20 cm    (0.6-1.1cm) LVIDd:         5.00 cm    (3.9-5.9cm) LVIDs:         3.10 cm LV Mass Index: 137.7 g/m2 LV % FS        38.0 % LA VOLUME:                             Normal Ranges: LA Volume Index: 18.8 ml/m2 LV SYSTOLIC FUNCTION BY 2D PLANIMETRY (MOD):                      Normal Ranges: EF-A4C View: 77.8 % (>=55%) EF-A2C View: 72.2 % EF-Biplane:  75.5 % LV DIASTOLIC FUNCTION:                              Normal Ranges: MV Peak E:        0.56 m/s   (0.7-1.2 m/s) MV Peak A:        0.78 m/s   (0.42-0.7 m/s) E/A Ratio:        0.72       (1.0-2.2) MV e'             0.06 m/s   (>8.0) E/e' Ratio:       9.55       (<8.0) MV DT:            275 msec   (150-240 msec) PulmV Sys Biju:    48.00 cm/s PulmV Galan Biju:   47.10 cm/s PulmV S/D Biju:    1.00 PulmV A Revs Biju: 28.30 cm/s MITRAL VALVE:                 Normal Ranges: MV DT: 275 msec (150-240msec) AORTIC VALVE:                                    Normal Ranges: AoV Vmax:                2.03 m/s  (<=1.7m/s) AoV Peak P.5 mmHg (<20mmHg) AoV Mean P.0 mmHg  (1.7-11.5mmHg) LVOT Max Biju:            1.29 m/s  (<=1.1m/s) AoV VTI:                 35.30 cm  (18-25cm) LVOT VTI:                25.10 cm LVOT Diameter:           1.80 cm   (1.8-2.4cm) AoV Area, VTI:           1.81 cm2  (2.5-5.5cm2) AoV Area,Vmax:           1.62 cm2  (2.5-4.5cm2) AoV Dimensionless Index: 0.71  RIGHT VENTRICLE: TAPSE: 18.2 mm RV s'  0.16 m/s Pulmonary Veins: PulmV A Revs Biju: 28.30 cm/s PulmV Galan Biju:   47.10 cm/s PulmV S/D Biju:    1.00 PulmV Sys Biju:    48.00 cm/s  [id number] Ortiz Napoles MD Electronically signed on 2024 at 1:31:50 PM  ** Final **     Transthoracic Echo (TTE) Limited    Result Date: 2024   [facility], [address] [place] [address]                Tel [phone] and Fax [phone] TRANSTHORACIC ECHOCARDIOGRAM REPORT  Patient Name:      JAIRO Chavira Physician:    [id number] Moise Jimenez MD Study Date:        2024            Ordering Provider:    [id number] LOPEZ JUAREZ MRN/PID:           [id number]             Fellow: Accession#:        [id number]          Nurse: Date of Birth/Age: 1935 / 89 years Sonographer:          Tiff Hess RDCS, [place] Gender:            F                    Additional Staff: Height:            154.94 cm            Admit Date:           2/23/2024 Weight:            72.58 kg             Admission Status:     Inpatient -                                                               Routine BSA / BMI:         1.72 m2 / 30.23      Encounter#:           [id number]                    kg/m2                                         Department Location:  [facility]                                                               Cath Lab Blood Pressure: 141 /63 mmHg Study Type:    TRANSTHORACIC ECHO (TTE) LIMITED Diagnosis/ICD: Nonrheumatic aortic (valve) stenosis-I35.0 Indication:    TAVR Periprocedure CPT Code:      Echo Limited-57859; Doppler Limited-95860; Color Doppler-44869 Patient History: Pertinent History: HTN, Hyperlipidemia and Murmur. CKD, AS. Study Detail: The following Echo studies were performed: 2D, M-Mode, Doppler and               color flow. Technically challenging study due to patient lying in               supine position and body habitus.  PHYSICIAN INTERPRETATION: Left Ventricle: The left ventricular systolic function is normal, with an estimated ejection fraction of 70%. There are no regional wall motion abnormalities. The left ventricular cavity size is normal. Spectral Doppler shows a normal pattern of left ventricular diastolic filling. There is concentric LVH. Left Atrium: The left atrium is normal in size. Right Ventricle: The right ventricle is normal in size. There is normal right ventricular global systolic function. Right Atrium: The right atrium is normal in size. Aortic Valve: The aortic valve is probably trileaflet. There is moderate to severe aortic valve cusp calcification. The aortic valve dimensionless index is 0.32. There is no evidence of  aortic valve regurgitation. The peak instantaneous gradient of the aortic valve is 39.4 mmHg. The mean gradient of the aortic valve is 22.0 mmHg. Mitral Valve: The mitral valve is normal in structure. There is mild to moderate mitral valve regurgitation. Tricuspid Valve: The tricuspid valve is structurally normal. There is trace to mild tricuspid regurgitation. The Doppler estimated RVSP is mildly elevated at 35.5 mmHg. Pulmonic Valve: The pulmonic valve is not well visualized. The pulmonic valve regurgitation was not well visualized. Pericardium: There is no pericardial effusion noted. Aorta: The aortic root is normal.  Post Transcatheter Aortic Valve Placement (TAVR): The peak instantaneous gradient of the aortic valve is 12.4 mmHg. The mean gradient of the aortic valve is 5.0 mmHg. There is a Medtronic transcatheter aortic valve replacement, with a 26 mm reported size. There is no mckenna-prosthetic aortic valve regurgitation.  CONCLUSIONS:  1. Left ventricular systolic function is normal with a 70% estimated ejection fraction.  2. Poorly visualized anatomical structures due to suboptimal image quality.  3. Mild to moderate mitral valve regurgitation.  4. Mildly elevated RVSP.  5. There is moderate to severe aortic valve cusp calcification. QUANTITATIVE DATA SUMMARY: 2D MEASUREMENTS:                          Normal Ranges: Ao Root d:     2.80 cm   (2.0-3.7cm) LAs:           4.00 cm   (2.7-4.0cm) IVSd:          0.80 cm   (0.6-1.1cm) LVPWd:         0.80 cm   (0.6-1.1cm) LVIDd:         4.90 cm   (3.9-5.9cm) LVIDs:         3.10 cm LV Mass Index: 76.4 g/m2 LV % FS        36.7 % LA VOLUME:                               Normal Ranges: LA Vol A4C:        55.8 ml    (22+/-6mL/m2) LA Vol A2C:        46.8 ml LA Vol BP:         51.2 ml LA Vol Index A4C:  32.5ml/m2 LA Vol Index A2C:  27.2 ml/m2 LA Vol Index BP:   29.8 ml/m2 LA Area A4C:       18.7 cm2 LA Area A2C:       17.1 cm2 LA Major Axis A4C: 5.3 cm LA Major Axis A2C: 5.3  cm LA Volume Index:   29.8 ml/m2 AORTA MEASUREMENTS:                    Normal Ranges: Asc Ao, d: 2.80 cm (2.1-3.4cm) LV SYSTOLIC FUNCTION BY 2D PLANIMETRY (MOD):                     Normal Ranges: EF-A4C View: 78.6 % (>=55%) EF-A2C View: 69.6 % EF-Biplane:  75.1 % LV DIASTOLIC FUNCTION:                           Normal Ranges: MV Peak E:    0.61 m/s    (0.7-1.2 m/s) MV Peak A:    0.56 m/s    (0.42-0.7 m/s) E/A Ratio:    1.09        (1.0-2.2) MV e'         0.10 m/s    (>8.0) MV lateral e' 0.12 m/s MV medial e'  0.07 m/s MV A Dur:     129.00 msec E/e' Ratio:   6.45        (<8.0) MV DT:        269 msec    (150-240 msec) MITRAL VALVE:                 Normal Ranges: MV DT: 269 msec (150-240msec) AORTIC VALVE:                                              Normal Ranges: AoV Vmax:                          3.14 m/s  (<=1.7m/s) AoV Vmax Post TAVR:                1.76 m/s  (<=1.7m/s) AoV Peak P.4 mmHg (<20mmHg) AoV Peak PG Post TAVR:             12.4 mmHg (<20mmHg) AoV Mean P.0 mmHg (1.7-11.5mmHg) AoV Mean PG Post TAVR:             5.0 mmHg  (1.7-11.5mmHg) LVOT Max Biju:                      1.14 m/s  (<=1.1m/s) LVOT Max Biju Post TAVR:            1.14 m/s  (<=1.1m/s) AoV VTI:                           73.40 cm  (18-25cm) AoV VTI Post TAVR:                 34.40 cm  (18-25cm) LVOT VTI:                          23.80 cm LVOT VTI Post TAVR:                23.80 cm LVOT Diameter:                     1.80 cm   (1.8-2.4cm) LVOT Diameter Post TAVR:           1.80 cm   (1.8-2.4cm) AoV Area, VTI:                     0.83 cm2  (2.5-5.5cm2) AoV Area, VTI Post TAVR:           1.76 cm2  (2.5-5.5cm2) AoV Area,Vmax:                     0.92 cm2  (2.5-4.5cm2) AoV Area,Vmax Post TAVR:           1.65 cm2  (2.5-4.5cm2) AoV Dimensionless Index:           0.32 AoV Dimensionless Index Post TAVR: 0.69  RIGHT VENTRICLE: TAPSE: 19.3 mm RV s'  0.11 m/s TRICUSPID VALVE/RVSP:                              Normal Ranges: Peak TR Velocity: 2.85 m/s RV Syst Pressure: 35.5 mmHg (< 30mmHg) IVC Diam:         1.14 cm  [id number] Moise Jimenez MD Electronically signed on 2/23/2024 at 5:40:09 PM  ** Final **     Transthoracic Echo (TTE) Complete    Result Date: 11/30/2023   [facility], [address] [place] [address]              Tel [phone] and Fax [phone] TRANSTHORACIC ECHOCARDIOGRAM REPORT  Patient Name:     JAIRO LEA    Reading Physician:  [id number] Lazaro Stovall DO Study Date:       11/30/2023          Ordering Provider:  [id number] ALAYNA CARTY MRN/PID:          [id number]            Fellow: Accession#:       [id number]        Nurse: Date of           1935 / 88      Sonographer:        Carlos Gutierrez RDMS Birth/Age:        years Gender:           F                   Additional Staff:   Lena Casanova RD Height:           154.00 cm           Admit Date: Weight:           74.00 kg            Admission Status:   Outpatient BSA:              1.72 m2             Encounter#:         [id number]                                       [facility]                                       Location:           Invasive Blood Pressure: 122 /78 mmHg Study Type:    TRANSTHORACIC ECHO (TTE) COMPLETE Diagnosis/ICD: Nonrheumatic aortic (valve) stenosis-I35.0 Indication:    AS CPT Code:      Echo Complete w Full Doppler-71412 Patient History: Pertinent History: HTN. Study Detail: The following Echo studies were performed: 2D, M-Mode, Doppler and               color flow.  PHYSICIAN INTERPRETATION: Left Ventricle: The left ventricular systolic function is normal, with an estimated ejection fraction of 60%. There are no regional wall motion abnormalities. The left ventricular cavity size is normal. Spectral Doppler shows a pseudonormal pattern of left ventricular diastolic filling. Left Atrium: The left atrium is upper limits of normal in size. Right Ventricle: The right ventricle is normal in  size. There is normal right ventricular global systolic function. Right Atrium: The right atrium is normal in size. Aortic Valve: The aortic valve is probably trileaflet. There is moderate to severe aortic valve cusp calcification. There is evidence of moderate to severe aortic valve stenosis. There is low flow across the aortic valve, with a normal ejection fraction. The aortic valve dimensionless index is 0.23. There is no evidence of aortic valve regurgitation. The peak instantaneous gradient of the aortic valve is 50.1 mmHg. The mean gradient of the aortic valve is 29.0 mmHg. Mitral Valve: The mitral valve is mildly thickened. There is mild mitral valve regurgitation. Tricuspid Valve: The tricuspid valve is structurally normal. No evidence of tricuspid regurgitation. Pulmonic Valve: The pulmonic valve is structurally normal. There is no indication of pulmonic valve regurgitation. Pericardium: There is no pericardial effusion noted. Aorta: The aortic root is normal. In comparison to the previous echocardiogram(s): Compared with study from 11/8/2022, dimensionless index 0.23 down from 0.34 suggesting more severe aortic stenosis. Clinical correlation suggested.  CONCLUSIONS:  1. Left ventricular systolic function is normal with a 60% estimated ejection fraction.  2. Spectral Doppler shows a pseudonormal pattern of left ventricular diastolic filling.  3. Moderate to severe aortic valve stenosis. pk/mn 50/29 mmHg, DI 0.23, ADITYA 0.6 suggests low flow low gradient severe type.  4. There is moderate to severe aortic valve cusp calcification.  5. Compared with study from 11/8/2022, dimensionless index 0.23 down from 0.34 suggesting more severe aortic stenosis. Clinical correlation suggested. QUANTITATIVE DATA SUMMARY: 2D MEASUREMENTS:                           Normal Ranges: IVSd:          1.50 cm    (0.6-1.1cm) LVPWd:         1.40 cm    (0.6-1.1cm) LVIDd:         4.00 cm    (3.9-5.9cm) LVIDs:         2.70 cm LV Mass  Index: 127.9 g/m2 LV % FS        32.5 % LA VOLUME:                               Normal Ranges: LA Vol A4C:        65.0 ml    (22+/-6mL/m2) LA Vol A2C:        55.9 ml LA Vol BP:         63.0 ml LA Vol Index A4C:  37.7ml/m2 LA Vol Index A2C:  32.4 ml/m2 LA Vol Index BP:   36.5 ml/m2 LA Area A4C:       19.5 cm2 LA Area A2C:       18.9 cm2 LA Major Axis A4C: 5.0 cm LA Major Axis A2C: 5.4 cm LA Volume Index:   36.5 ml/m2 RA VOLUME BY A/L METHOD:                               Normal Ranges: RA Vol A4C:        35.2 ml    (8.3-19.5ml) RA Vol Index A4C:  20.4 ml/m2 RA Area A4C:       15.3 cm2 RA Major Axis A4C: 5.7 cm M-MODE MEASUREMENTS:                  Normal Ranges: Ao Root: 2.50 cm (2.0-3.7cm) LAs:     5.20 cm (2.7-4.0cm) AORTA MEASUREMENTS:                      Normal Ranges: Ao Sinus, d: 2.57 cm (2.1-3.5cm) Ao STJ, d:   1.83 cm (1.7-3.4cm) Asc Ao, d:   3.20 cm (2.1-3.4cm) LV SYSTOLIC FUNCTION BY 2D PLANIMETRY (MOD):                     Normal Ranges: EF-A4C View: 66.7 % (>=55%) EF-A2C View: 69.9 % EF-Biplane:  69.3 % LV DIASTOLIC FUNCTION:                               Normal Ranges: MV Peak E:        0.66 m/s    (0.7-1.2 m/s) MV Peak A:        0.71 m/s    (0.42-0.7 m/s) E/A Ratio:        0.92        (1.0-2.2) MV lateral e'     0.13 m/s MV medial e'      0.06 m/s E/e' Ratio:       5.00        (<8.0) PulmV Sys Biju:    60.20 cm/s PulmV Galan Biju:   65.20 cm/s PulmV S/D Biju:    0.90 PulmV A Revs Biju: 20.70 cm/s PulmV A Revs Dur: 109.00 msec MITRAL VALVE:                 Normal Ranges: MV DT: 387 msec (150-240msec) AORTIC VALVE:                                    Normal Ranges: AoV Vmax:                3.54 m/s  (<=1.7m/s) AoV Peak P.1 mmHg (<20mmHg) AoV Mean P.0 mmHg (1.7-11.5mmHg) LVOT Max Biju:            0.94 m/s  (<=1.1m/s) AoV VTI:                 90.80 cm  (18-25cm) LVOT VTI:                21.20 cm LVOT Diameter:           1.74 cm   (1.8-2.4cm) AoV Area, VTI:           0.56 cm2   (2.5-5.5cm2) AoV Area,Vmax:           0.63 cm2  (2.5-4.5cm2) AoV Dimensionless Index: 0.23  RIGHT VENTRICLE: RV Basal 3.14 cm RV Mid   2.37 cm RV Major 4.8 cm TAPSE:   23.3 mm RV s'    0.13 m/s TRICUSPID VALVE/RVSP:                             Normal Ranges: Peak TR Velocity: 2.88 m/s Est. RA Pressure: 3 mmHg RV Syst Pressure: 36.2 mmHg (< 30mmHg) IVC Diam:         1.06 cm PULMONIC VALVE:                       Normal Ranges: PV Max Biju: 1.8 m/s   (0.6-0.9m/s) PV Max P.7 mmHg Pulmonary Veins: PulmV A Revs Dur: 109.00 msec PulmV A Revs Biju: 20.70 cm/s PulmV Galan Biju:   65.20 cm/s PulmV S/D Biju:    0.90 PulmV Sys Biju:    60.20 cm/s  [id number] Lazaro Stovall DO Electronically signed on 2023 at 5:12:13 PM  ** Final **            Heart Failure Follow up    NYHA class 1    Edema Denies  Dyspnea on Exertion Denies  Fatigue Improved  Exercise Intolerance Denies  Orthopnea Denies  PND Denies    Chest pain No  Syncope No  Palpitations No  Weight gain No  Weight loss No        All organ systems normal           KCCQ Questionnaire      1  Heart failure affects different people in different ways. Some feel shortness of breath while others feel fatigue. Please indicate how much you are limited by heart failure (shortness of breath or fatigue) in your ability to do the following activities over the past 2 weeks.     A.) Showering/bathing  5. Not at All  B.) Walking 1 block on level ground 4. Slightly  C.) Hurrying or Jogging   6. Limited for other reastons    2.  Over the past 2 weeks, how many times did you have swelling in your feet, ankles or legs when you woke up in the morning? 5. Never    3.  Over the past 2 weeks, on average, how many times has fatigue limited your ability to do what you wanted? 7. Never    4.  Over the past 2 weeks, on average, how many times has shortness of breath limited your ability to do what you wanted? 5. 1-2 times a week    5.  Over the past 2 weeks, on average, how many times have you been  forced to sleep sitting up in a chair or with at least 3 pillows to prop you up because of shortness of breath? Never    6. Over the past 2 weeks, how much has your heart failure limited your enjoyment of life? It has not limited my enjoyment of life    7. If you had to spend the rest of your life with your heart failure the way it is right now, how would you feel about this? 5. Completely satisfied    8. How much does your heart failure affect your lifestyle? Please indicate how your heart failure may have limited yourparticipation in the following activities over the past 2 weeks    A.)  Hobbies, recreational activities  5. Did not limit at all    B.) Working or doing household chores  5. Did not limit at all    C.) Visiting family or friends out of your home  5. Did not limit at all    Impression  Doing well clinically, states she has some minor SOB more when doing activities, states it's likely d/t back pain.  -170s/60-70s  states her cuff is old and she recently bought a new one.  Her BP in office setting is 110s......will monitor BP at home with new cuff and relay results.       Plan:  - Cont current medication regimen  - ASA for life  - Echo with nml EF and MG 5.8  - Cont to increase activity as tolerated   - f/u with Dr Traylor   - Life-long Dental SBE prophylaxis needed

## 2024-03-25 ENCOUNTER — LAB (OUTPATIENT)
Dept: LAB | Facility: LAB | Age: 89
End: 2024-03-25
Payer: MEDICARE

## 2024-03-25 DIAGNOSIS — E78.49 OTHER HYPERLIPIDEMIA: ICD-10-CM

## 2024-03-25 LAB
CHOLEST SERPL-MCNC: 179 MG/DL (ref 0–199)
CHOLESTEROL/HDL RATIO: 2.2
HDLC SERPL-MCNC: 82.6 MG/DL
LDLC SERPL CALC-MCNC: 67 MG/DL
NON HDL CHOLESTEROL: 96 MG/DL (ref 0–149)
TRIGL SERPL-MCNC: 146 MG/DL (ref 0–149)
VLDL: 29 MG/DL (ref 0–40)

## 2024-03-25 PROCEDURE — 36415 COLL VENOUS BLD VENIPUNCTURE: CPT

## 2024-03-25 PROCEDURE — 80061 LIPID PANEL: CPT

## 2024-04-01 DIAGNOSIS — Z95.2 S/P TAVR (TRANSCATHETER AORTIC VALVE REPLACEMENT): ICD-10-CM

## 2024-04-01 DIAGNOSIS — R53.81 PHYSICAL DECONDITIONING: Primary | ICD-10-CM

## 2024-04-09 ENCOUNTER — LAB (OUTPATIENT)
Dept: LAB | Facility: LAB | Age: 89
End: 2024-04-09
Payer: MEDICARE

## 2024-04-09 ENCOUNTER — OFFICE VISIT (OUTPATIENT)
Dept: PRIMARY CARE | Facility: CLINIC | Age: 89
End: 2024-04-09
Payer: MEDICARE

## 2024-04-09 VITALS — OXYGEN SATURATION: 98 % | DIASTOLIC BLOOD PRESSURE: 62 MMHG | HEART RATE: 67 BPM | SYSTOLIC BLOOD PRESSURE: 140 MMHG

## 2024-04-09 DIAGNOSIS — M19.032 PRIMARY OSTEOARTHRITIS OF LEFT WRIST: ICD-10-CM

## 2024-04-09 DIAGNOSIS — Z95.2 S/P TAVR (TRANSCATHETER AORTIC VALVE REPLACEMENT): ICD-10-CM

## 2024-04-09 DIAGNOSIS — I10 PRIMARY HYPERTENSION: ICD-10-CM

## 2024-04-09 DIAGNOSIS — I10 PRIMARY HYPERTENSION: Primary | ICD-10-CM

## 2024-04-09 LAB
ANION GAP SERPL CALC-SCNC: 15 MMOL/L (ref 10–20)
BASOPHILS # BLD AUTO: 0.09 X10*3/UL (ref 0–0.1)
BASOPHILS NFR BLD AUTO: 1.3 %
BUN SERPL-MCNC: 36 MG/DL (ref 6–23)
CALCIUM SERPL-MCNC: 10.5 MG/DL (ref 8.6–10.3)
CHLORIDE SERPL-SCNC: 102 MMOL/L (ref 98–107)
CO2 SERPL-SCNC: 27 MMOL/L (ref 21–32)
CREAT SERPL-MCNC: 1.61 MG/DL (ref 0.5–1.05)
EGFRCR SERPLBLD CKD-EPI 2021: 30 ML/MIN/1.73M*2
EOSINOPHIL # BLD AUTO: 0.2 X10*3/UL (ref 0–0.4)
EOSINOPHIL NFR BLD AUTO: 3 %
ERYTHROCYTE [DISTWIDTH] IN BLOOD BY AUTOMATED COUNT: 14.8 % (ref 11.5–14.5)
GLUCOSE SERPL-MCNC: 103 MG/DL (ref 74–99)
HCT VFR BLD AUTO: 35.2 % (ref 36–46)
HGB BLD-MCNC: 10.9 G/DL (ref 12–16)
IMM GRANULOCYTES # BLD AUTO: 0.04 X10*3/UL (ref 0–0.5)
IMM GRANULOCYTES NFR BLD AUTO: 0.6 % (ref 0–0.9)
LYMPHOCYTES # BLD AUTO: 1.44 X10*3/UL (ref 0.8–3)
LYMPHOCYTES NFR BLD AUTO: 21.5 %
MCH RBC QN AUTO: 27.9 PG (ref 26–34)
MCHC RBC AUTO-ENTMCNC: 31 G/DL (ref 32–36)
MCV RBC AUTO: 90 FL (ref 80–100)
MONOCYTES # BLD AUTO: 0.49 X10*3/UL (ref 0.05–0.8)
MONOCYTES NFR BLD AUTO: 7.3 %
NEUTROPHILS # BLD AUTO: 4.43 X10*3/UL (ref 1.6–5.5)
NEUTROPHILS NFR BLD AUTO: 66.3 %
NRBC BLD-RTO: 0 /100 WBCS (ref 0–0)
PLATELET # BLD AUTO: 234 X10*3/UL (ref 150–450)
POTASSIUM SERPL-SCNC: 4.5 MMOL/L (ref 3.5–5.3)
RBC # BLD AUTO: 3.91 X10*6/UL (ref 4–5.2)
SODIUM SERPL-SCNC: 139 MMOL/L (ref 136–145)
WBC # BLD AUTO: 6.7 X10*3/UL (ref 4.4–11.3)

## 2024-04-09 PROCEDURE — 3077F SYST BP >= 140 MM HG: CPT | Performed by: INTERNAL MEDICINE

## 2024-04-09 PROCEDURE — 1159F MED LIST DOCD IN RCRD: CPT | Performed by: INTERNAL MEDICINE

## 2024-04-09 PROCEDURE — 80048 BASIC METABOLIC PNL TOTAL CA: CPT

## 2024-04-09 PROCEDURE — 85025 COMPLETE CBC W/AUTO DIFF WBC: CPT

## 2024-04-09 PROCEDURE — 99213 OFFICE O/P EST LOW 20 MIN: CPT | Performed by: INTERNAL MEDICINE

## 2024-04-09 PROCEDURE — 3078F DIAST BP <80 MM HG: CPT | Performed by: INTERNAL MEDICINE

## 2024-04-09 PROCEDURE — 36415 COLL VENOUS BLD VENIPUNCTURE: CPT

## 2024-04-09 PROCEDURE — 1160F RVW MEDS BY RX/DR IN RCRD: CPT | Performed by: INTERNAL MEDICINE

## 2024-04-09 RX ORDER — VALSARTAN 80 MG/1
80 TABLET ORAL DAILY
Qty: 30 TABLET | Refills: 2 | Status: SHIPPED | OUTPATIENT
Start: 2024-04-09 | End: 2024-05-28 | Stop reason: SDUPTHER

## 2024-04-09 NOTE — ASSESSMENT & PLAN NOTE
Recommend the patient start using the Voltaren gel 2-3 times daily on a regular basis.  Hopefully if she continues to exercise some of her lower back and hip pain will get better as well.

## 2024-04-09 NOTE — PROGRESS NOTES
Subjective   Patient ID: Josee Henson is a 89 y.o. female who presents for No chief complaint on file..    Patient is here for follow-up.  She continues to have issues around low back pain hip and thigh pain as well as hand pain.  Unfortunately she has not gotten started with cardiac rehab is now almost 8 weeks since her TAVR.  She is working out with a  doing a lot of stretching and balance exercises and riding a stationary bike but probably is not getting as much exercise as she should be.  She did try Voltaren gel for her hand pain but is only using it on an as-needed basis and only once a day.  She is due for repeat labs today to follow-up on her chronic renal insufficiency and anemia.  She is now taking iron daily.         Review of Systems    Objective   /62   Pulse 67   SpO2 98%     Physical Exam  Cardiovascular:      Rate and Rhythm: Normal rate and regular rhythm.      Pulses: Normal pulses.      Heart sounds: Normal heart sounds.      Comments: TAVR replacement no murmurs noted.  Pulmonary:      Breath sounds: Normal breath sounds.   Musculoskeletal:      Right lower leg: No edema.      Left lower leg: No edema.      Comments: Osteoarthritic changes of the hands bilaterally.         Assessment/Plan   Problem List Items Addressed This Visit             ICD-10-CM    Osteoarthritis M19.90     Recommend the patient start using the Voltaren gel 2-3 times daily on a regular basis.  Hopefully if she continues to exercise some of her lower back and hip pain will get better as well.         Hypertension - Primary I10    Relevant Medications    valsartan (Diovan) 80 mg tablet    Other Relevant Orders    CBC and Auto Differential    Basic Metabolic Panel    S/P TAVR (transcatheter aortic valve replacement) Z95.2     Doing extremely well.  She will start cardiac rehab in 2 weeks.  Will repeat CBC to follow-up on her anemia.

## 2024-04-09 NOTE — ASSESSMENT & PLAN NOTE
Doing extremely well.  She will start cardiac rehab in 2 weeks.  Will repeat CBC to follow-up on her anemia.

## 2024-04-12 ENCOUNTER — HOSPITAL ENCOUNTER (OUTPATIENT)
Dept: CARDIOLOGY | Facility: HOSPITAL | Age: 89
Discharge: HOME | End: 2024-04-12
Payer: MEDICARE

## 2024-04-12 DIAGNOSIS — I44.2 COMPLETE HEART BLOCK, POST-SURGICAL (MULTI): Primary | ICD-10-CM

## 2024-04-12 DIAGNOSIS — I97.89 COMPLETE HEART BLOCK, POST-SURGICAL (MULTI): Primary | ICD-10-CM

## 2024-04-12 DIAGNOSIS — I44.30 AVB (ATRIOVENTRICULAR BLOCK): ICD-10-CM

## 2024-04-12 DIAGNOSIS — Z95.0 CARDIAC PACEMAKER IN SITU: ICD-10-CM

## 2024-04-12 PROCEDURE — 93280 PM DEVICE PROGR EVAL DUAL: CPT | Performed by: INTERNAL MEDICINE

## 2024-04-12 PROCEDURE — 93280 PM DEVICE PROGR EVAL DUAL: CPT

## 2024-04-23 ENCOUNTER — CLINICAL SUPPORT (OUTPATIENT)
Dept: CARDIAC REHAB | Facility: CLINIC | Age: 89
End: 2024-04-23
Payer: MEDICARE

## 2024-04-23 VITALS
HEART RATE: 64 BPM | HEIGHT: 61 IN | WEIGHT: 160 LBS | DIASTOLIC BLOOD PRESSURE: 82 MMHG | BODY MASS INDEX: 30.21 KG/M2 | RESPIRATION RATE: 11 BRPM | OXYGEN SATURATION: 97 % | SYSTOLIC BLOOD PRESSURE: 144 MMHG

## 2024-04-23 DIAGNOSIS — Z95.2 S/P TAVR (TRANSCATHETER AORTIC VALVE REPLACEMENT): Primary | ICD-10-CM

## 2024-04-23 DIAGNOSIS — R53.81 PHYSICAL DECONDITIONING: ICD-10-CM

## 2024-04-23 ASSESSMENT — DUKE ACTIVITY SCORE INDEX (DASI)
CAN YOU TAKE CARE OF YOURSELF (EAT, DRESS, BATHE, OR USE TOILET): YES
CAN YOU RUN A SHORT DISTANCE: NO
CAN YOU DO HEAVY WORK AROUND THE HOUSE LIKE SCRUBBING FLOORS OR LIFTING AND MOVING HEAVY FURNITURE: NO
CAN YOU DO LIGHT WORK AROUND THE HOUSE LIKE DUSTING OR WASHING DISHES: YES
TOTAL_SCORE: 10.7
CAN YOU WALK A BLOCK OR TWO ON LEVEL GROUND: NO
DASI METS SCORE: 4.1
CAN YOU DO YARD WORK LIKE RAKING LEAVES, WEEDING OR PUSHING A MOWER: NO
CAN YOU HAVE SEXUAL RELATIONS: NO
CAN YOU DO MODERATE WORK AROUND THE HOUSE LIKE VACUUMING, SWEEPING FLOORS OR CARRYING GROCERIES: YES
CAN YOU PARTICIPATE IN MODERATE RECREATIONAL ACTIVITIES LIKE GOLF, BOWLING, DANCING, DOUBLES TENNIS OR THROWING A BASEBALL OR FOOTBALL: NO
CAN YOU PARTICIPATE IN STRENOUS SPORTS LIKE SWIMMING, SINGLES TENNIS, FOOTBALL, BASKETBALL, OR SKIING: NO
CAN YOU CLIMB A FLIGHT OF STAIRS OR WALK UP A HILL: NO
CAN YOU WALK INDOORS, SUCH AS AROUND YOUR HOUSE: YES

## 2024-04-23 ASSESSMENT — PATIENT HEALTH QUESTIONNAIRE - PHQ9
6. FEELING BAD ABOUT YOURSELF - OR THAT YOU ARE A FAILURE OR HAVE LET YOURSELF OR YOUR FAMILY DOWN: NOT AT ALL
SUM OF ALL RESPONSES TO PHQ QUESTIONS 1-9: 3
5. POOR APPETITE OR OVEREATING: NOT AT ALL
7. TROUBLE CONCENTRATING ON THINGS, SUCH AS READING THE NEWSPAPER OR WATCHING TELEVISION: NOT AT ALL
8. MOVING OR SPEAKING SO SLOWLY THAT OTHER PEOPLE COULD HAVE NOTICED. OR THE OPPOSITE, BEING SO FIGETY OR RESTLESS THAT YOU HAVE BEEN MOVING AROUND A LOT MORE THAN USUAL: NOT AT ALL
1. LITTLE INTEREST OR PLEASURE IN DOING THINGS: SEVERAL DAYS
2. FEELING DOWN, DEPRESSED OR HOPELESS: NOT AT ALL
3. TROUBLE FALLING OR STAYING ASLEEP OR SLEEPING TOO MUCH: SEVERAL DAYS
SUM OF ALL RESPONSES TO PHQ QUESTIONS 1-9: 3
9. THOUGHTS THAT YOU WOULD BE BETTER OFF DEAD, OR OF HURTING YOURSELF: NOT AT ALL
4. FEELING TIRED OR HAVING LITTLE ENERGY: SEVERAL DAYS
SUM OF ALL RESPONSES TO PHQ9 QUESTIONS 1 & 2: 1

## 2024-04-23 NOTE — PROGRESS NOTES
Cardiac Rehabilitation Initial Treatment Plan    Name: Josee Henson  Medical Record Number: [id number]  YOB: 1935  Age: 89 y.o.    Today’s Date: 4/23/2024  Primary Care Physician: Bashir Smith MD  Referring Physician: Braydon Correa,*  Program Location: [address]     General  Primary Diagnosis:   1. Physical deconditioning  Referral to Cardiac Rehab      2. S/P TAVR (transcatheter aortic valve replacement)  Referral to Cardiac Rehab         Onset/Date of Diagnosis: 2/23/2024    Initial Assessment, not yet started program.    AACVPR Risk Stratification: High     Falls Risk: High  Psychosocial Assessment     Pre PHQ-9: 3      Sent PH-Q 9 to MD if score > 20: No; score < 20    Pt reported/currently experiencing stress: No  Patient uses stress management skills: No   History of: no history of anxiety or depression  Currently seeing a mental health provider: No  Social Support: Yes, Whom:family  Quality of Life Survey: SF-36   SF-36 Pre Post   Physical Component Score  TBD   Mental Component Score  TBD     Learning Assessment:  Learning assessment/barriers: None  Preferred learning method: Visual  Barriers: None  Comments:    Stages of Change:Preparation    Psychosocial Plan    Goal Status: Initial Assessment; goals not yet started    Psychosocial Goals: Demonstrating proper techniques for stress management and Maintain or lower PH-Q 9 score by discharge    Psychosocial Interventions/Education: To be done in Cardiac Rehab.    Initial Assessment: in progress    Nutrition Assessment:    Hyperlipidemia: Yes     Lipids:   Lab Results   Component Value Date    CHOL 179 03/25/2024    HDL 82.6 03/25/2024    LDLF 81 10/27/2022    TRIG 146 03/25/2024       Current Dietary Guidelines: Low fat, Low sodium  Barriers to dietary change: no    Diet Habit Survey: Picture Your Plate  Pre:  53  Post: To be done at discharge.    Diabetes Assessment    Lab Results   Component Value Date    HGBA1C 6.0  "(H) 10/26/2023       History of Diabetes: No    Weight Management    Height: 154.9 cm (5' 0.98\")  Weight: 72.6 kg (160 lb)  BMI (Calculated): 30.25  No data recorded    Nutrition Plan    Goal Status: Initial Assessment; goals not yet started    Nutrition Goals: Improve Diet Habit Survey score by 5-10 points by discharge and Adapt a low-sodium, DASH diet prior to discharge    Nutrition Interventions/Education:   To be done in Cardiac Rehab.    Initial Assessment: in progress    Exercise Assessment    No  Mode: NA  Frequency: NA  Duration: NA    Exercise Prescription     Exercise Prescription based on: Duke Activity Status Index (DASI)    DASI Score: 10.7   MET Score: 4.1   Frequency:  3 days/week   Mode: NuStep and Recumbent Cycle   Duration: 21 total aerobic minutes   Intensity: RPE 12-16  Target HR:  To be calculated after 6 attended sessions.  MET Level: 2.1  Patient wears supplemental O2: No     Modality Workload METs Duration (minutes)   1 Pre-Exercise   2:00   2 Warm Up    5 :00   3 NuStep 2@34w  2.1 14 :00   4 Recumbent Bike 2@45  1.853 7 :00   5 Cooldown    5 :00   6 Post-Exercise   2:00     Resistance Training: No   Home Exercise Prescription given: To be given at session # 6    Exercise Plan    Goal Status: Initial Assessment; goals not yet started    Exercise Goals: Increase exercise MET level by 5-10% each week, Increase total exercise duration to 30-45 minutes, and Obtain 150 minutes/week of moderate intensity aerobic exercise    Exercise Interventions/Education:   To be done in Cardiac Rehab.    Initial Assessment: in progress    Other Core Components/Risk Factor Assessment:    Medication adherence  Current Medications:   Medication Documentation Review Audit       Reviewed by Lauren Olmos RN (Registered Nurse) on 04/23/24 at 1122      Medication Order Taking? Sig Documenting Provider Last Dose Status   acetaminophen (Tylenol Extra Strength) 500 mg tablet [id number] Yes Take 2 tablets (1,000 mg) by " mouth every 8 hours if needed for mild pain (1 - 3), headaches or fever (temp greater than 38.0 C). As directed. Manjinder Weber MD Taking Active   amoxicillin (Amoxil) 500 mg capsule [id number] Yes TAKE 4 CAPSULES BY MOUTH 1 HOUR BEFORE PROCEDURE Manjinder Weber MD Taking Active   aspirin (Ecotrin Low Strength) 81 mg EC tablet [id number] Yes Take 1 tablet (81 mg) by mouth once daily at bedtime. Manjinder Weber MD Taking Active   atorvastatin (Lipitor) 40 mg tablet [id number] Yes Take 1 tablet (40 mg) by mouth once daily. Berry Doty, APRN-CNP, DNP Taking Active   biotin 5 mg capsule [id number] Yes Take 1 capsule (5 mg) by mouth once daily. Manjinder Weber MD Taking Active   calcium carbonate-vitamin D3 (Oscal-500) 500 mg-10 mcg (400 unit) tablet [id number] Yes Take 2 tablets by mouth once daily at bedtime. Manjinder Weber MD Taking Active   denosumab (Prolia) 60 mg/mL syringe [id number] Yes Inject 1 mL (60 mg) under the skin every 6 months. Manjinder Weber MD Taking Active   fexofenadine (Allegra Allergy) 180 mg tablet [id number] Yes Take 1 tablet (180 mg) by mouth once daily. Manjinder Weber MD Taking Active   flaxseed oiL 1,000 mg capsule [id number] Yes Take 1 capsule (1,000 mg) by mouth 2 times a day. Manjinder Weber MD Taking Active   fluticasone (Flonase) 50 mcg/actuation nasal spray [id number] Yes Administer 2 sprays into each nostril once daily. Manjinder Weber MD Taking Active   levothyroxine (Synthroid, Levoxyl) 125 mcg tablet [id number] Yes Take 1 tablet (125 mcg) by mouth once daily. Manjinder Weber MD Taking Active   multivitamin with minerals tablet [id number] Yes Take 1 tablet by mouth once daily. Manjinder Weber MD Taking Active   omeprazole (PriLOSEC) 20 mg DR capsule [id number] Yes Take 1 capsule (20 mg) by mouth once daily. Manjinder Weber MD Taking Active   quiNINE (Qualaquin) 324 mg capsule [id number] Yes Take 1 capsule (324 mg) by mouth once  daily at bedtime. For leg cramps Historical Provider, MD Taking Active   valsartan (Diovan) 80 mg tablet [id number] Yes Take 1 tablet (80 mg) by mouth once daily. Bashir Smith MD Taking Active   zolpidem (Ambien) 5 mg tablet [id number] Yes Take 1 tablet (5 mg) by mouth as needed at bedtime for sleep. Bashir Smith MD Taking Active                                 Medication compliance: Yes   Uses pill box/organizer: Yes    Carries medication list: Yes     Blood Pressure Management  History of Hypertension: Yes   Medication Changes: No   Resting BP:  Visit Vitals  /82 (BP Location: Left arm, Patient Position: Sitting, BP Cuff Size: Small adult)   Pulse 64   Resp 11        Heart Failure Management  Hx of Heart Failure: No    Smoking/Tobacco Assessment  Social History     Tobacco Use   Smoking Status Never   Smokeless Tobacco Never       Other Core Component Plan    Goal Status: Initial Assessment; goals not yet started    Other Core Component Goals: Verbalize medication usage and drug actions by discharge    Other Core Component Interventions/Education:   Achieve resting BP of < 130/80 by discharge    Initial Assessment: in progress    Individual Patient Goals:    Increase strength and endurance to help with stability by discharge  Return to her her personal training to help with strength by discharge    Goal Status: Initial Assessment; goals not yet started    Staff Comments:  Pt will be attending 3 days a week      Rehab Staff Signature: Lauren Olmos RN

## 2024-04-29 ENCOUNTER — CLINICAL SUPPORT (OUTPATIENT)
Dept: CARDIAC REHAB | Facility: CLINIC | Age: 89
End: 2024-04-29
Payer: MEDICARE

## 2024-04-29 DIAGNOSIS — Z95.2 S/P TAVR (TRANSCATHETER AORTIC VALVE REPLACEMENT): ICD-10-CM

## 2024-04-29 PROCEDURE — 93798 PHYS/QHP OP CAR RHAB W/ECG: CPT | Performed by: INTERNAL MEDICINE

## 2024-05-01 ENCOUNTER — CLINICAL SUPPORT (OUTPATIENT)
Dept: CARDIAC REHAB | Facility: CLINIC | Age: 89
End: 2024-05-01
Payer: MEDICARE

## 2024-05-01 DIAGNOSIS — Z95.2 S/P TAVR (TRANSCATHETER AORTIC VALVE REPLACEMENT): ICD-10-CM

## 2024-05-01 PROCEDURE — 93798 PHYS/QHP OP CAR RHAB W/ECG: CPT | Performed by: INTERNAL MEDICINE

## 2024-05-03 ENCOUNTER — CLINICAL SUPPORT (OUTPATIENT)
Dept: CARDIAC REHAB | Facility: CLINIC | Age: 89
End: 2024-05-03
Payer: MEDICARE

## 2024-05-03 DIAGNOSIS — Z95.2 S/P TAVR (TRANSCATHETER AORTIC VALVE REPLACEMENT): ICD-10-CM

## 2024-05-03 PROCEDURE — 93798 PHYS/QHP OP CAR RHAB W/ECG: CPT | Performed by: INTERNAL MEDICINE

## 2024-05-06 ENCOUNTER — CLINICAL SUPPORT (OUTPATIENT)
Dept: CARDIAC REHAB | Facility: CLINIC | Age: 89
End: 2024-05-06
Payer: MEDICARE

## 2024-05-06 DIAGNOSIS — Z95.2 S/P TAVR (TRANSCATHETER AORTIC VALVE REPLACEMENT): ICD-10-CM

## 2024-05-06 PROCEDURE — 93798 PHYS/QHP OP CAR RHAB W/ECG: CPT

## 2024-05-07 DIAGNOSIS — I25.10 CAD (CORONARY ARTERY DISEASE): ICD-10-CM

## 2024-05-08 ENCOUNTER — CLINICAL SUPPORT (OUTPATIENT)
Dept: CARDIAC REHAB | Facility: CLINIC | Age: 89
End: 2024-05-08
Payer: MEDICARE

## 2024-05-08 DIAGNOSIS — Z95.2 S/P TAVR (TRANSCATHETER AORTIC VALVE REPLACEMENT): ICD-10-CM

## 2024-05-08 PROCEDURE — 93798 PHYS/QHP OP CAR RHAB W/ECG: CPT | Performed by: INTERNAL MEDICINE

## 2024-05-08 RX ORDER — ATORVASTATIN CALCIUM 40 MG/1
40 TABLET, FILM COATED ORAL DAILY
Qty: 90 TABLET | Refills: 0 | Status: SHIPPED | OUTPATIENT
Start: 2024-05-08

## 2024-05-10 ENCOUNTER — CLINICAL SUPPORT (OUTPATIENT)
Dept: CARDIAC REHAB | Facility: CLINIC | Age: 89
End: 2024-05-10
Payer: MEDICARE

## 2024-05-10 DIAGNOSIS — Z95.2 S/P TAVR (TRANSCATHETER AORTIC VALVE REPLACEMENT): ICD-10-CM

## 2024-05-10 PROCEDURE — 93798 PHYS/QHP OP CAR RHAB W/ECG: CPT | Performed by: INTERNAL MEDICINE

## 2024-05-13 ENCOUNTER — CLINICAL SUPPORT (OUTPATIENT)
Dept: CARDIAC REHAB | Facility: CLINIC | Age: 89
End: 2024-05-13
Payer: MEDICARE

## 2024-05-13 DIAGNOSIS — Z95.2 S/P TAVR (TRANSCATHETER AORTIC VALVE REPLACEMENT): ICD-10-CM

## 2024-05-13 PROCEDURE — 93798 PHYS/QHP OP CAR RHAB W/ECG: CPT | Performed by: INTERNAL MEDICINE

## 2024-05-15 ENCOUNTER — CLINICAL SUPPORT (OUTPATIENT)
Dept: CARDIAC REHAB | Facility: CLINIC | Age: 89
End: 2024-05-15
Payer: MEDICARE

## 2024-05-15 DIAGNOSIS — Z95.2 S/P TAVR (TRANSCATHETER AORTIC VALVE REPLACEMENT): ICD-10-CM

## 2024-05-15 PROCEDURE — 93798 PHYS/QHP OP CAR RHAB W/ECG: CPT

## 2024-05-17 ENCOUNTER — CLINICAL SUPPORT (OUTPATIENT)
Dept: CARDIAC REHAB | Facility: CLINIC | Age: 89
End: 2024-05-17
Payer: MEDICARE

## 2024-05-17 ENCOUNTER — DOCUMENTATION (OUTPATIENT)
Dept: CARDIAC REHAB | Facility: CLINIC | Age: 89
End: 2024-05-17

## 2024-05-17 DIAGNOSIS — Z95.2 S/P TAVR (TRANSCATHETER AORTIC VALVE REPLACEMENT): ICD-10-CM

## 2024-05-17 PROCEDURE — 93798 PHYS/QHP OP CAR RHAB W/ECG: CPT | Performed by: INTERNAL MEDICINE

## 2024-05-17 NOTE — PROGRESS NOTES
Cardiac Rehabilitation 30 Day Reassessment    Name: Josee Henson  Medical Record Number: [id number]  YOB: 1935  Age: 89 y.o.    Today’s Date: 5/17/2024  Primary Care Physician: Bashir Smith MD  Referring Physician: Pérez Traylor MD  Program Location: [address]  Primary Diagnosis:   TAVR      Onset/Date of Diagnosis: 2/23/2024    Session #10    AACVPR Risk Stratification: High      Falls Risk: Patient has remained free from falls while enrolled in rehab as of 5/17/24.   Psychosocial Assessment     Pre PHQ-9: 3    Sent PH-Q 9 to MD if score > 20: No; score < 20    Pt reported/currently experiencing stress: No  Patient uses stress management skills: No   History of: no history of anxiety or depression  Currently seeing a mental health provider: No  Social Support: Yes, Whom:family  Quality of Life Survey: SF-36   SF-36 Pre Post   Physical Component Score  TBD   Mental Component Score  TBD     Learning Assessment:  Learning assessment/barriers: None  Preferred learning method: Visual  Barriers: None  Comments:    Stages of Change:Action    Psychosocial Plan    Goal Status: In progress    Psychosocial Goals: Demonstrating proper techniques for stress management and Maintain or lower PH-Q 9 score by discharge    Psychosocial Interventions/Education:   4/29/24 Initial PHQ-9 score reviewed. CV  5/17/24 Report no new stress/cv    Psychosocial Reassessment: in progress    Nutrition Assessment:    Hyperlipidemia: Yes     Lipids:   Lab Results   Component Value Date    CHOL 179 03/25/2024    HDL 82.6 03/25/2024    LDLF 81 10/27/2022    TRIG 146 03/25/2024       Current Dietary Guidelines: Low fat, Low sodium  Barriers to dietary change: no    Diet Habit Survey: Picture Your Plate  Pre:  53  Post: To be done at discharge.    Diabetes Assessment    Lab Results   Component Value Date    HGBA1C 6.0 (H) 10/26/2023       History of Diabetes: No    Weight Management  Height: 61  inches  Weight: 159.6 lbs  BMI: 30.15     Nutrition Plan    Goal Status: In progress    Nutrition Goals: Improve Diet Habit Survey score by 5-10 points by discharge and Adapt a low-sodium, DASH diet prior to discharge    Nutrition Interventions/Education:   5/1/24 Gave and reviewed Heart Healthy Tips handout. CLINT    Nutrition Reassessment: in progress    Exercise Assessment    No  Mode: NA  Frequency: NA  Duration: NA    Exercise Prescription     Exercise Prescription based on: Duke Activity Status Index (DASI)    DASI Score: 10.7    MET Score: 4.1    Frequency:  3 days/week   Mode: NuStep and Recumbent Cycle   Duration: 24 total aerobic minutes   Intensity: RPE 12-16  Target HR:     MET Level: 2.1  Patient wears supplemental O2: No     Modality Workload METs Duration (minutes)   1 Pre-Exercise   2:00   2 Warm Up    5 :00   3 NuStep Load 2 @ 34 [place] 2.1 12 :00   4 Recumbent Bike Load 2 @ 45 rpm 1.8 12 :00   5 Cooldown    5 :00   6 Post-Exercise   2:00     Resistance Training: No   Home Exercise Prescription given:  yes    Exercise Plan    Goal Status: In progress    Exercise Goals: Increase exercise MET level by 5-10% each week, Increase total exercise duration to 30-45 minutes, and Obtain 150 minutes/week of moderate intensity aerobic exercise    Exercise Interventions/Education:   5/15/24 adjusted THR/MS  5/17/24 gave pt home ex rx & log, encouraged pt to exercise on non-rehab days/MS    Exercise Reassessment: in progress    Other Core Components/Risk Factor Assessment:    Medication adherence  Current Medications:   Medication Documentation Review Audit       Reviewed by Lauren Olmos RN (Registered Nurse) on 04/23/24 at 1122      Medication Order Taking? Sig Documenting Provider Last Dose Status   acetaminophen (Tylenol Extra Strength) 500 mg tablet [id number] Yes Take 2 tablets (1,000 mg) by mouth every 8 hours if needed for mild pain (1 - 3), headaches or fever (temp greater than 38.0 C). As directed.  Manjinder Weber MD Taking Active   amoxicillin (Amoxil) 500 mg capsule [id number] Yes TAKE 4 CAPSULES BY MOUTH 1 HOUR BEFORE PROCEDURE Manjinder Weber MD Taking Active   aspirin (Ecotrin Low Strength) 81 mg EC tablet [id number] Yes Take 1 tablet (81 mg) by mouth once daily at bedtime. Manjinder Weber MD Taking Active   atorvastatin (Lipitor) 40 mg tablet [id number] Yes Take 1 tablet (40 mg) by mouth once daily. Berry Doty, APRN-CNP, DNP Taking Active   biotin 5 mg capsule [id number] Yes Take 1 capsule (5 mg) by mouth once daily. Manjinder Weber MD Taking Active   calcium carbonate-vitamin D3 (Oscal-500) 500 mg-10 mcg (400 unit) tablet [id number] Yes Take 2 tablets by mouth once daily at bedtime. Manjinder Weber MD Taking Active   denosumab (Prolia) 60 mg/mL syringe [id number] Yes Inject 1 mL (60 mg) under the skin every 6 months. Manjinder Weber MD Taking Active   fexofenadine (Allegra Allergy) 180 mg tablet [id number] Yes Take 1 tablet (180 mg) by mouth once daily. Historical MD Tia Taking Active   flaxseed oiL 1,000 mg capsule [id number] Yes Take 1 capsule (1,000 mg) by mouth 2 times a day. Historical MD Tia Taking Active   fluticasone (Flonase) 50 mcg/actuation nasal spray [id number] Yes Administer 2 sprays into each nostril once daily. Manjinder Weber MD Taking Active   levothyroxine (Synthroid, Levoxyl) 125 mcg tablet [id number] Yes Take 1 tablet (125 mcg) by mouth once daily. Manjinder Weber MD Taking Active   multivitamin with minerals tablet [id number] Yes Take 1 tablet by mouth once daily. Manjinder Weber MD Taking Active   omeprazole (PriLOSEC) 20 mg DR capsule [id number] Yes Take 1 capsule (20 mg) by mouth once daily. Manjinder Weber MD Taking Active   quiNINE (Qualaquin) 324 mg capsule [id number] Yes Take 1 capsule (324 mg) by mouth once daily at bedtime. For leg cramps Manjinder Weber MD Taking Active   valsartan (Diovan) 80 mg tablet  [id number] Yes Take 1 tablet (80 mg) by mouth once daily. Bashir Smith MD Taking Active   zolpidem (Ambien) 5 mg tablet [id number] Yes Take 1 tablet (5 mg) by mouth as needed at bedtime for sleep. Bashir Smith MD Taking Active                                 Medication compliance: Yes   Uses pill box/organizer: Yes    Carries medication list: Yes     Blood Pressure Management  History of Hypertension: Yes   Medication Changes: No   Resting BP:  138/60       Heart Failure Management  Hx of Heart Failure: No    Smoking/Tobacco Assessment  Social History     Tobacco Use   Smoking Status Never   Smokeless Tobacco Never       Other Core Component Plan    Goal Status: In progress    Other Core Component Goals: Verbalize medication usage and drug actions by discharge, Achieve resting BP of < 130/80 by discharge    Other Core Component Interventions/Education:   4/29/24 BP readings taken pre and post exercise at each session to ensure proper control/cv  5/17/24 Reviewed effects of exercise on BP/cv    Other Core Components Reassessment: in progress    Individual Patient Goals:    Increase strength and endurance to help with stability by discharge  Return to her her personal training to help with strength by discharge    Goal Status: In progress    Staff Comments:  Ms. Henson has started her cardiac rehab and has attended 10 sessions so far.  She tries the best that she can exercising on the NS and RB due to her ortho issues.  She is advised to follow a heart healthy diet and exercise on days not at rehab to increase her strength and endurance.  She voiced no cardiac complaint and no ectopy was observed on the monitor during exercise sessions.     Rehab Staff Signature: Ankur Alicia RN

## 2024-05-20 ENCOUNTER — CLINICAL SUPPORT (OUTPATIENT)
Dept: CARDIAC REHAB | Facility: CLINIC | Age: 89
End: 2024-05-20
Payer: MEDICARE

## 2024-05-20 DIAGNOSIS — Z95.2 S/P TAVR (TRANSCATHETER AORTIC VALVE REPLACEMENT): ICD-10-CM

## 2024-05-20 PROCEDURE — 93798 PHYS/QHP OP CAR RHAB W/ECG: CPT | Performed by: INTERNAL MEDICINE

## 2024-05-22 ENCOUNTER — CLINICAL SUPPORT (OUTPATIENT)
Dept: CARDIAC REHAB | Facility: CLINIC | Age: 89
End: 2024-05-22
Payer: MEDICARE

## 2024-05-22 DIAGNOSIS — Z95.2 S/P TAVR (TRANSCATHETER AORTIC VALVE REPLACEMENT): ICD-10-CM

## 2024-05-22 PROCEDURE — 93798 PHYS/QHP OP CAR RHAB W/ECG: CPT | Performed by: INTERNAL MEDICINE

## 2024-05-24 ENCOUNTER — CLINICAL SUPPORT (OUTPATIENT)
Dept: CARDIAC REHAB | Facility: CLINIC | Age: 89
End: 2024-05-24
Payer: MEDICARE

## 2024-05-24 DIAGNOSIS — Z95.2 S/P TAVR (TRANSCATHETER AORTIC VALVE REPLACEMENT): ICD-10-CM

## 2024-05-24 PROCEDURE — 93798 PHYS/QHP OP CAR RHAB W/ECG: CPT

## 2024-05-28 DIAGNOSIS — I10 PRIMARY HYPERTENSION: ICD-10-CM

## 2024-05-28 RX ORDER — VALSARTAN 40 MG/1
40 TABLET ORAL 2 TIMES DAILY
Qty: 180 TABLET | Refills: 3 | Status: SHIPPED | OUTPATIENT
Start: 2024-05-28 | End: 2025-05-28

## 2024-05-28 NOTE — TELEPHONE ENCOUNTER
Patient called requesting a refill on her valsartan. She is taking 40 mg in the morning and 40 mg in the evening. She had 80s and was cutting them in half but if she can get it filled at 40 mg 2x a day, that would be better.     She would like [facility] sent to [facility]

## 2024-05-29 ENCOUNTER — CLINICAL SUPPORT (OUTPATIENT)
Dept: CARDIAC REHAB | Facility: CLINIC | Age: 89
End: 2024-05-29
Payer: MEDICARE

## 2024-05-29 DIAGNOSIS — Z95.2 S/P TAVR (TRANSCATHETER AORTIC VALVE REPLACEMENT): ICD-10-CM

## 2024-05-29 PROCEDURE — 93798 PHYS/QHP OP CAR RHAB W/ECG: CPT

## 2024-05-31 ENCOUNTER — CLINICAL SUPPORT (OUTPATIENT)
Dept: CARDIAC REHAB | Facility: CLINIC | Age: 89
End: 2024-05-31
Payer: MEDICARE

## 2024-05-31 DIAGNOSIS — Z95.2 S/P TAVR (TRANSCATHETER AORTIC VALVE REPLACEMENT): ICD-10-CM

## 2024-05-31 PROCEDURE — 93798 PHYS/QHP OP CAR RHAB W/ECG: CPT | Performed by: INTERNAL MEDICINE

## 2024-06-03 ENCOUNTER — CLINICAL SUPPORT (OUTPATIENT)
Dept: CARDIAC REHAB | Facility: CLINIC | Age: 89
End: 2024-06-03
Payer: MEDICARE

## 2024-06-03 DIAGNOSIS — Z95.2 S/P TAVR (TRANSCATHETER AORTIC VALVE REPLACEMENT): ICD-10-CM

## 2024-06-03 PROCEDURE — 93798 PHYS/QHP OP CAR RHAB W/ECG: CPT | Performed by: INTERNAL MEDICINE

## 2024-06-05 ENCOUNTER — CLINICAL SUPPORT (OUTPATIENT)
Dept: CARDIAC REHAB | Facility: CLINIC | Age: 89
End: 2024-06-05
Payer: MEDICARE

## 2024-06-05 DIAGNOSIS — Z95.2 S/P TAVR (TRANSCATHETER AORTIC VALVE REPLACEMENT): ICD-10-CM

## 2024-06-05 PROCEDURE — 93798 PHYS/QHP OP CAR RHAB W/ECG: CPT | Performed by: INTERNAL MEDICINE

## 2024-06-05 NOTE — PROGRESS NOTES
Josee COOK Natividad  1935  [id number]  89 y.o.    [facility] [id number]      Cardiac/Pulmonary Rehab Nutrition Education    6/5/2024  Gave and reviewed handout on Label Reading Tips. Encouraged pt to read labels regularly for sat fat, sodium, fiber, and added sugars. Encouraged to review handout further and follow up with questions as needed.    Signature Nisa Riggs, CHANTELN, LD

## 2024-06-07 ENCOUNTER — CLINICAL SUPPORT (OUTPATIENT)
Dept: CARDIAC REHAB | Facility: CLINIC | Age: 89
End: 2024-06-07
Payer: MEDICARE

## 2024-06-07 DIAGNOSIS — Z95.2 S/P TAVR (TRANSCATHETER AORTIC VALVE REPLACEMENT): ICD-10-CM

## 2024-06-07 PROCEDURE — 93798 PHYS/QHP OP CAR RHAB W/ECG: CPT

## 2024-06-10 ENCOUNTER — TELEPHONE (OUTPATIENT)
Dept: ENDOCRINOLOGY | Facility: CLINIC | Age: 89
End: 2024-06-10

## 2024-06-10 ENCOUNTER — CLINICAL SUPPORT (OUTPATIENT)
Dept: CARDIAC REHAB | Facility: CLINIC | Age: 89
End: 2024-06-10
Payer: MEDICARE

## 2024-06-10 DIAGNOSIS — E03.9 HYPOTHYROIDISM, UNSPECIFIED TYPE: Primary | ICD-10-CM

## 2024-06-10 DIAGNOSIS — Z95.2 S/P TAVR (TRANSCATHETER AORTIC VALVE REPLACEMENT): ICD-10-CM

## 2024-06-10 DIAGNOSIS — E21.3 HYPERPARATHYROIDISM (MULTI): ICD-10-CM

## 2024-06-10 PROCEDURE — 93798 PHYS/QHP OP CAR RHAB W/ECG: CPT

## 2024-06-11 ENCOUNTER — LAB (OUTPATIENT)
Dept: LAB | Facility: LAB | Age: 89
End: 2024-06-11
Payer: MEDICARE

## 2024-06-11 DIAGNOSIS — E03.9 HYPOTHYROIDISM, UNSPECIFIED TYPE: ICD-10-CM

## 2024-06-11 DIAGNOSIS — E21.3 HYPERPARATHYROIDISM (MULTI): ICD-10-CM

## 2024-06-11 LAB
25(OH)D3 SERPL-MCNC: 58 NG/ML (ref 30–100)
ALBUMIN SERPL BCP-MCNC: 4 G/DL (ref 3.4–5)
ALP SERPL-CCNC: 88 U/L (ref 33–136)
ALT SERPL W P-5'-P-CCNC: 15 U/L (ref 7–45)
ANION GAP SERPL CALC-SCNC: 13 MMOL/L (ref 10–20)
AST SERPL W P-5'-P-CCNC: 22 U/L (ref 9–39)
BILIRUB SERPL-MCNC: 0.4 MG/DL (ref 0–1.2)
BUN SERPL-MCNC: 32 MG/DL (ref 6–23)
CALCIUM SERPL-MCNC: 10.4 MG/DL (ref 8.6–10.6)
CHLORIDE SERPL-SCNC: 105 MMOL/L (ref 98–107)
CO2 SERPL-SCNC: 30 MMOL/L (ref 21–32)
CREAT SERPL-MCNC: 1.57 MG/DL (ref 0.5–1.05)
EGFRCR SERPLBLD CKD-EPI 2021: 31 ML/MIN/1.73M*2
GLUCOSE SERPL-MCNC: 94 MG/DL (ref 74–99)
POTASSIUM SERPL-SCNC: 4.5 MMOL/L (ref 3.5–5.3)
PROT SERPL-MCNC: 6.1 G/DL (ref 6.4–8.2)
SODIUM SERPL-SCNC: 143 MMOL/L (ref 136–145)
T4 FREE SERPL-MCNC: 1.32 NG/DL (ref 0.78–1.48)
TSH SERPL-ACNC: 6.37 MIU/L (ref 0.44–3.98)

## 2024-06-11 PROCEDURE — 84439 ASSAY OF FREE THYROXINE: CPT

## 2024-06-11 PROCEDURE — 36415 COLL VENOUS BLD VENIPUNCTURE: CPT

## 2024-06-11 PROCEDURE — 82306 VITAMIN D 25 HYDROXY: CPT

## 2024-06-11 PROCEDURE — 84443 ASSAY THYROID STIM HORMONE: CPT

## 2024-06-11 PROCEDURE — 80053 COMPREHEN METABOLIC PANEL: CPT

## 2024-06-12 ENCOUNTER — CLINICAL SUPPORT (OUTPATIENT)
Dept: CARDIAC REHAB | Facility: CLINIC | Age: 89
End: 2024-06-12
Payer: MEDICARE

## 2024-06-12 DIAGNOSIS — Z95.2 S/P TAVR (TRANSCATHETER AORTIC VALVE REPLACEMENT): ICD-10-CM

## 2024-06-12 PROCEDURE — 93798 PHYS/QHP OP CAR RHAB W/ECG: CPT | Performed by: INTERNAL MEDICINE

## 2024-06-13 ENCOUNTER — APPOINTMENT (OUTPATIENT)
Dept: ENDOCRINOLOGY | Facility: CLINIC | Age: 89
End: 2024-06-13
Payer: MEDICARE

## 2024-06-13 VITALS
SYSTOLIC BLOOD PRESSURE: 130 MMHG | DIASTOLIC BLOOD PRESSURE: 80 MMHG | BODY MASS INDEX: 30.02 KG/M2 | HEART RATE: 82 BPM | HEIGHT: 61 IN | WEIGHT: 159 LBS | RESPIRATION RATE: 16 BRPM

## 2024-06-13 DIAGNOSIS — E21.3 HYPERPARATHYROIDISM (MULTI): ICD-10-CM

## 2024-06-13 DIAGNOSIS — E03.9 HYPOTHYROIDISM, UNSPECIFIED TYPE: Primary | ICD-10-CM

## 2024-06-13 DIAGNOSIS — M81.0 OSTEOPOROSIS WITHOUT CURRENT PATHOLOGICAL FRACTURE, UNSPECIFIED OSTEOPOROSIS TYPE: ICD-10-CM

## 2024-06-13 PROCEDURE — 3079F DIAST BP 80-89 MM HG: CPT | Performed by: INTERNAL MEDICINE

## 2024-06-13 PROCEDURE — 1160F RVW MEDS BY RX/DR IN RCRD: CPT | Performed by: INTERNAL MEDICINE

## 2024-06-13 PROCEDURE — 3075F SYST BP GE 130 - 139MM HG: CPT | Performed by: INTERNAL MEDICINE

## 2024-06-13 PROCEDURE — 99214 OFFICE O/P EST MOD 30 MIN: CPT | Performed by: INTERNAL MEDICINE

## 2024-06-13 PROCEDURE — 1036F TOBACCO NON-USER: CPT | Performed by: INTERNAL MEDICINE

## 2024-06-13 PROCEDURE — 1159F MED LIST DOCD IN RCRD: CPT | Performed by: INTERNAL MEDICINE

## 2024-06-13 RX ORDER — LEVOTHYROXINE SODIUM 137 UG/1
137 TABLET ORAL DAILY
Qty: 90 TABLET | Refills: 3 | Status: SHIPPED | OUTPATIENT
Start: 2024-06-13 | End: 2025-06-13

## 2024-06-13 ASSESSMENT — ENCOUNTER SYMPTOMS
FATIGUE: 0
COUGH: 0
NAUSEA: 0
PALPITATIONS: 0
FEVER: 0
CHILLS: 0
VOMITING: 0
SHORTNESS OF BREATH: 0
HEADACHES: 0
DIARRHEA: 0

## 2024-06-13 NOTE — PROGRESS NOTES
Endocrinology: Follow up visit  Subjective   Patient ID: Josee Henson is a 89 y.o. female who presents for Hyperparathyroidism, Hypothyroidism, Osteoporosis, and Prediabetes.    PCP: Bashir Smith MD    HPI  Since last visit still struggling with joint issues.   Also had TAVR earlier this year and did great with it.   Still on prolia for osteo.  Doing fine with injections.  Taking t4 as directed    Review of Systems   Constitutional:  Negative for chills, fatigue and fever.   Respiratory:  Negative for cough and shortness of breath.    Cardiovascular:  Negative for chest pain and palpitations.   Gastrointestinal:  Negative for diarrhea, nausea and vomiting.   Neurological:  Negative for headaches.       Patient Active Problem List   Diagnosis    Allergic rhinitis    Arthritis    Osteoarthritis    Bruit of right carotid artery    Bursitis of left hip    Carcinomas, basal cell    Chronic gout of right hip    Chronic hyperglycemia    Cough productive of purulent sputum    Diarrhea of presumed infectious origin    Dietary iron deficiency    Elevated homocysteine    Gastroesophageal reflux disease    Gout    Hyperlipidemia    Hyperparathyroidism (Multi)    Hypertension    Hypothyroidism    Impairment of balance    Insomnia    Lumbar radiculopathy    Lymphocytic-plasmacytic colitis    Nocturnal leg cramps    Osteoporosis    Pelvic fracture (Multi)    Obesity (BMI 30.0-34.9)    Primary localized osteoarthrosis of shoulder region    Prediabetes    Pseudophakia, both eyes    Vitamin D insufficiency    Vitamin deficiency    Chronic renal impairment, stage 3a (Multi)    S/P TAVR (transcatheter aortic valve replacement)    Complete heart block, post-surgical (Multi)    Presence of temporary transvenous cardiac pacemaker    Right bundle branch block (RBBB)        Home Meds:  Current Outpatient Medications   Medication Instructions    acetaminophen (TYLENOL EXTRA STRENGTH) 1,000 mg, oral, Every 8 hours PRN, As directed.     amoxicillin (Amoxil) 500 mg capsule TAKE 4 CAPSULES BY MOUTH 1 HOUR BEFORE PROCEDURE    aspirin (Ecotrin Low Strength) 81 mg EC tablet 1 tablet, oral, Nightly    atorvastatin (LIPITOR) 40 mg, oral, Daily    biotin 5 mg capsule 1 capsule, oral, Daily    calcium carbonate-vitamin D3 (Oscal-500) 500 mg-10 mcg (400 unit) tablet 2 tablets, oral, Nightly    denosumab (Prolia) 60 mg/mL syringe 1 mL, subcutaneous, Every 6 months    fexofenadine (ALLEGRA ALLERGY) 180 mg, oral, Daily    flaxseed oiL 1,000 mg capsule 1 capsule, oral, 2 times daily    fluticasone (Flonase) 50 mcg/actuation nasal spray 2 sprays, Each Nostril, Daily    levothyroxine (Synthroid, Levoxyl) 125 mcg tablet 1 tablet, oral, Daily    multivitamin with minerals tablet 1 tablet, oral, Daily    omeprazole (PriLOSEC) 20 mg DR capsule 1 capsule, oral, Daily    quiNINE (Qualaquin) 324 mg capsule 1 capsule, oral, Nightly, For leg cramps    valsartan (DIOVAN) 40 mg, oral, 2 times daily    zolpidem (AMBIEN) 5 mg, oral, Nightly PRN        Allergies   Allergen Reactions    Tetracycline Rash and Unknown        Objective   Vitals:    06/13/24 1105   BP: 130/80   Pulse: 82   Resp: 16      Vitals:    06/13/24 1105   Weight: 72.1 kg (159 lb)      Body mass index is 30.06 kg/m².   Physical Exam  Constitutional:       Appearance: Normal appearance. She is overweight.   HENT:      Head: Normocephalic and atraumatic.   Neck:      Thyroid: No thyroid mass, thyromegaly or thyroid tenderness.   Cardiovascular:      Rate and Rhythm: Normal rate and regular rhythm.      Heart sounds: No murmur heard.     No gallop.   Pulmonary:      Effort: Pulmonary effort is normal.      Breath sounds: Normal breath sounds.   Abdominal:      Palpations: Abdomen is soft.      Comments: benign   Neurological:      General: No focal deficit present.      Mental Status: She is alert and oriented to person, place, and time.      Deep Tendon Reflexes: Reflexes are normal and symmetric.  "  Psychiatric:         Behavior: Behavior is cooperative.         Labs:  Lab Results   Component Value Date    HGBA1C 6.0 (H) 10/26/2023    TSH 6.37 (H) 06/11/2024    FREET4 1.32 06/11/2024      No results found for: \"PR1\", \"THYROIDPAB\", \"TSI\"     Assessment/Plan   Problem List Items Addressed This Visit       Hyperparathyroidism (Multi)    Hypothyroidism - Primary    Osteoporosis   Hypothyroidism:  Levels are low: increase to 137 mcg and repeat labs in 2 months  Primary hyperpara:  Calcium labs look good  Osteo:  Stable on prolia  Follow up in one year      Electronically signed by:  Leonila Bocanegra MD 06/13/24 11:40 AM              "

## 2024-06-14 ENCOUNTER — DOCUMENTATION (OUTPATIENT)
Dept: CARDIAC REHAB | Facility: CLINIC | Age: 89
End: 2024-06-14

## 2024-06-14 ENCOUNTER — APPOINTMENT (OUTPATIENT)
Dept: CARDIOLOGY | Facility: HOSPITAL | Age: 89
End: 2024-06-14
Payer: MEDICARE

## 2024-06-14 ENCOUNTER — CLINICAL SUPPORT (OUTPATIENT)
Dept: CARDIAC REHAB | Facility: CLINIC | Age: 89
End: 2024-06-14
Payer: MEDICARE

## 2024-06-14 DIAGNOSIS — Z95.2 S/P TAVR (TRANSCATHETER AORTIC VALVE REPLACEMENT): ICD-10-CM

## 2024-06-14 PROCEDURE — 93798 PHYS/QHP OP CAR RHAB W/ECG: CPT | Performed by: INTERNAL MEDICINE

## 2024-06-14 NOTE — PROGRESS NOTES
Cardiac Rehabilitation 60 Day Reassessment    Name: Josee Henson  Medical Record Number: [id number]  YOB: 1935  Age: 89 y.o.    Today’s Date: 6/14/2024  Primary Care Physician: Bashir Smith MD  Referring Physician: Pérez Traylor MD  Program Location: [address]  Primary Diagnosis:   TAVR      Onset/Date of Diagnosis: 2/23/2024    Session #20    AACVPR Risk Stratification: High      Falls Risk: Patient has remained free from falls while enrolled in rehab as of 5/17/24.   Psychosocial Assessment     Pre PHQ-9: 3    Sent PH-Q 9 to MD if score > 20: No; score < 20    Pt reported/currently experiencing stress: No  Patient uses stress management skills: No   History of: no history of anxiety or depression  Currently seeing a mental health provider: No  Social Support: Yes, Whom:family  Quality of Life Survey: SF-36   SF-36 Pre Post   Physical Component Score TBD TBD   Mental Component Score TBD TBD     Learning Assessment:  Learning assessment/barriers: None  Preferred learning method: Visual  Barriers: None  Comments:    Stages of Change:Action    Psychosocial Plan    Goal Status: In progress    Psychosocial Goals: Demonstrating proper techniques for stress management and Maintain or lower PH-Q 9 score by discharge    Psychosocial Interventions/Education:   4/29/24 Initial PHQ-9 score reviewed. CV  5/17/24 Report no new stress/cv  6/14/24 No change in psychosocial/cv    Psychosocial Reassessment: in progress    Nutrition Assessment:    Hyperlipidemia: Yes     Lipids:   Lab Results   Component Value Date    CHOL 179 03/25/2024    HDL 82.6 03/25/2024    LDLF 81 10/27/2022    TRIG 146 03/25/2024       Current Dietary Guidelines: Low fat, Low sodium  Barriers to dietary change: no    Diet Habit Survey: Picture Your Plate  Pre:  53  Post: To be done at discharge.    Diabetes Assessment    Lab Results   Component Value Date    HGBA1C 6.0 (H) 10/26/2023       History of Diabetes:  No    Weight Management  Height: 61 inches  Weight: 159.6 lbs  BMI: 30.15     Nutrition Plan    Goal Status: In progress    Nutrition Goals: Improve Diet Habit Survey score by 5-10 points by discharge and Adapt a low-sodium, DASH diet prior to discharge    Nutrition Interventions/Education:   5/1/24 Gave and reviewed Heart Healthy Tips handout. CLINT  6/5/2024  Gave and reviewed handout on Label Reading Tips. Encouraged pt to read labels regularly for sat fat, sodium, fiber, and added sugars. Encouraged to review handout further and follow up with questions as needed.  Signature Nisa Rgigs, CHANTELN, LD      Nutrition Reassessment: in progress    Exercise Assessment    No  Mode: NA  Frequency: NA  Duration: NA    Exercise Prescription     Exercise Prescription based on: [facility] Activity Status Index (DASI)    DASI Score: 10.7    MET Score: 4.1    Frequency:  3 days/week   Mode: NuStep and Recumbent Cycle   Duration: 32 total aerobic minutes   Intensity: RPE 12-16  Target HR:     MET Level: 2.1  Patient wears supplemental O2: No     Modality Workload METs Duration (minutes)   1 Pre-Exercise   2:00   2 Warm Up    5 :00   3 NuStep Load 3@ 40 Begum 2.3 16 :00   4 Recumbent Bike Load 4 @ 55 rpm 2.9 16 :00   5 Weights    10:00   6 Post-Exercise   2:00     Resistance Training: No   Home Exercise Prescription given:  yes    Exercise Plan    Goal Status: In progress    Exercise Goals: Increase exercise MET level by 5-10% each week, Increase total exercise duration to 30-45 minutes, and Obtain 150 minutes/week of moderate intensity aerobic exercise    Exercise Interventions/Education:   5/15/24 adjusted THR/MS  5/17/24 gave pt home ex rx & log, encouraged pt to exercise on non-rehab days/MS  6/7/24 Weight training with the EP intern/cv    Exercise Reassessment: in progress    Other Core Components/Risk Factor Assessment:    Medication adherence  Current Medications:   Medication Documentation Review Audit       Reviewed by  Leonila Bocanegra MD (Physician) on 06/13/24 at 1130      Medication Order Taking? Sig Documenting Provider Last Dose Status   acetaminophen (Tylenol Extra Strength) 500 mg tablet [id number] Yes Take 2 tablets (1,000 mg) by mouth every 8 hours if needed for mild pain (1 - 3), headaches or fever (temp greater than 38.0 C). As directed. Manjinder Weber MD Taking Active   amoxicillin (Amoxil) 500 mg capsule [id number] Yes TAKE 4 CAPSULES BY MOUTH 1 HOUR BEFORE PROCEDURE Historical Provider, MD Taking Active   aspirin (Ecotrin Low Strength) 81 mg EC tablet [id number] Yes Take 1 tablet (81 mg) by mouth once daily at bedtime. Historical MD Tia Taking Active   atorvastatin (Lipitor) 40 mg tablet [id number] Yes Take 1 tablet (40 mg) by mouth once daily. Bashir Smith MD Taking Active   biotin 5 mg capsule [id number] Yes Take 1 capsule (5 mg) by mouth once daily. Historical MD Tia Taking Active   calcium carbonate-vitamin D3 (Oscal-500) 500 mg-10 mcg (400 unit) tablet [id number] Yes Take 2 tablets by mouth once daily at bedtime. Historical Provider, MD Taking Active   denosumab (Prolia) 60 mg/mL syringe [id number] Yes Inject 1 mL (60 mg) under the skin every 6 months. Historical MD Tia Taking Active   fexofenadine (Allegra Allergy) 180 mg tablet [id number] Yes Take 1 tablet (180 mg) by mouth once daily. Historical MD Tia Taking Active   flaxseed oiL 1,000 mg capsule [id number] Yes Take 1 capsule (1,000 mg) by mouth 2 times a day. Historical MD Tia Taking Active   fluticasone (Flonase) 50 mcg/actuation nasal spray [id number] Yes Administer 2 sprays into each nostril once daily. Historical MD Tia Taking Active   levothyroxine (Synthroid, Levoxyl) 125 mcg tablet [id number] Yes Take 1 tablet (125 mcg) by mouth once daily. Historical MD Tia Taking Active   multivitamin with minerals tablet [id number] Yes Take 1 tablet by mouth once daily. Historical MD Tia Taking Active   omeprazole  (PriLOSEC) 20 mg DR capsule [id number] Yes Take 1 capsule (20 mg) by mouth once daily. Historical Provider, MD Taking Active   quiNINE (Qualaquin) 324 mg capsule [id number] Yes Take 1 capsule (324 mg) by mouth once daily at bedtime. For leg cramps Historical Provider, MD Taking Active   valsartan (Diovan) 40 mg tablet [id number] Yes Take 1 tablet (40 mg) by mouth 2 times a day. Elisa Hahn, APRN-CNP Taking Active   zolpidem (Ambien) 5 mg tablet [id number] Yes Take 1 tablet (5 mg) by mouth as needed at bedtime for sleep. Bashir Smith MD Taking Active                                 Medication compliance: Yes   Uses pill box/organizer: Yes    Carries medication list: Yes     Blood Pressure Management  History of Hypertension: Yes   Medication Changes: No   Resting BP:  138/60       Heart Failure Management  Hx of Heart Failure: No    Smoking/Tobacco Assessment  Social History     Tobacco Use   Smoking Status Never   Smokeless Tobacco Never       Other Core Component Plan    Goal Status: In progress    Other Core Component Goals: Verbalize medication usage and drug actions by discharge, Achieve resting BP of < 130/80 by discharge    Other Core Component Interventions/Education:   4/29/24 BP readings taken pre and post exercise at each session to ensure proper control/cv  5/17/24 Reviewed effects of exercise on BP/cv  6/14/24 Pt compliant with meds/cv    Other Core Components Reassessment: in progress    Individual Patient Goals:    Increase strength and endurance to help with stability by discharge  Return to her her personal training to help with strength by discharge    Goal Status: In progress    Staff Comments:  Ms. Henson has consistently attended cardiac rehab.  She has attended 20 sessions.  She is making progress on the NS and RB.  She has started weight training with the EP intern.  She has no complaint of chest pain and no ectopy was noted on the monitor.      Rehab Staff Signature: Ankur Alicia  RN

## 2024-06-17 ENCOUNTER — CLINICAL SUPPORT (OUTPATIENT)
Dept: CARDIAC REHAB | Facility: CLINIC | Age: 89
End: 2024-06-17
Payer: MEDICARE

## 2024-06-17 DIAGNOSIS — Z95.0 PACEMAKER: Primary | ICD-10-CM

## 2024-06-17 DIAGNOSIS — I49.5 SICK SINUS SYNDROME DUE TO SA NODE DYSFUNCTION (MULTI): ICD-10-CM

## 2024-06-17 DIAGNOSIS — Z95.2 S/P TAVR (TRANSCATHETER AORTIC VALVE REPLACEMENT): ICD-10-CM

## 2024-06-19 ENCOUNTER — CLINICAL SUPPORT (OUTPATIENT)
Dept: CARDIAC REHAB | Facility: CLINIC | Age: 89
End: 2024-06-19
Payer: MEDICARE

## 2024-06-19 DIAGNOSIS — Z95.2 S/P TAVR (TRANSCATHETER AORTIC VALVE REPLACEMENT): ICD-10-CM

## 2024-06-19 PROCEDURE — 93798 PHYS/QHP OP CAR RHAB W/ECG: CPT

## 2024-06-21 ENCOUNTER — CLINICAL SUPPORT (OUTPATIENT)
Dept: CARDIAC REHAB | Facility: CLINIC | Age: 89
End: 2024-06-21
Payer: MEDICARE

## 2024-06-21 DIAGNOSIS — Z95.2 S/P TAVR (TRANSCATHETER AORTIC VALVE REPLACEMENT): ICD-10-CM

## 2024-06-21 PROCEDURE — 93798 PHYS/QHP OP CAR RHAB W/ECG: CPT | Performed by: INTERNAL MEDICINE

## 2024-06-24 ENCOUNTER — CLINICAL SUPPORT (OUTPATIENT)
Dept: CARDIAC REHAB | Facility: CLINIC | Age: 89
End: 2024-06-24
Payer: MEDICARE

## 2024-06-24 DIAGNOSIS — Z95.2 S/P TAVR (TRANSCATHETER AORTIC VALVE REPLACEMENT): ICD-10-CM

## 2024-06-24 PROCEDURE — 93798 PHYS/QHP OP CAR RHAB W/ECG: CPT

## 2024-06-26 ENCOUNTER — CLINICAL SUPPORT (OUTPATIENT)
Dept: CARDIAC REHAB | Facility: CLINIC | Age: 89
End: 2024-06-26
Payer: MEDICARE

## 2024-06-26 DIAGNOSIS — Z95.2 S/P TAVR (TRANSCATHETER AORTIC VALVE REPLACEMENT): ICD-10-CM

## 2024-06-26 PROCEDURE — 93798 PHYS/QHP OP CAR RHAB W/ECG: CPT

## 2024-06-28 ENCOUNTER — CLINICAL SUPPORT (OUTPATIENT)
Dept: CARDIAC REHAB | Facility: CLINIC | Age: 89
End: 2024-06-28
Payer: MEDICARE

## 2024-06-28 DIAGNOSIS — Z95.2 S/P TAVR (TRANSCATHETER AORTIC VALVE REPLACEMENT): ICD-10-CM

## 2024-06-28 PROCEDURE — 93798 PHYS/QHP OP CAR RHAB W/ECG: CPT | Performed by: INTERNAL MEDICINE

## 2024-07-01 ENCOUNTER — CLINICAL SUPPORT (OUTPATIENT)
Dept: CARDIAC REHAB | Facility: CLINIC | Age: 89
End: 2024-07-01
Payer: MEDICARE

## 2024-07-01 DIAGNOSIS — Z95.2 S/P TAVR (TRANSCATHETER AORTIC VALVE REPLACEMENT): ICD-10-CM

## 2024-07-01 PROCEDURE — 93798 PHYS/QHP OP CAR RHAB W/ECG: CPT | Performed by: INTERNAL MEDICINE

## 2024-07-03 ENCOUNTER — CLINICAL SUPPORT (OUTPATIENT)
Dept: CARDIAC REHAB | Facility: CLINIC | Age: 89
End: 2024-07-03
Payer: MEDICARE

## 2024-07-03 DIAGNOSIS — Z95.2 S/P TAVR (TRANSCATHETER AORTIC VALVE REPLACEMENT): ICD-10-CM

## 2024-07-03 PROCEDURE — 93798 PHYS/QHP OP CAR RHAB W/ECG: CPT | Performed by: INTERNAL MEDICINE

## 2024-07-05 ENCOUNTER — CLINICAL SUPPORT (OUTPATIENT)
Dept: CARDIAC REHAB | Facility: CLINIC | Age: 89
End: 2024-07-05
Payer: MEDICARE

## 2024-07-05 DIAGNOSIS — Z95.2 S/P TAVR (TRANSCATHETER AORTIC VALVE REPLACEMENT): ICD-10-CM

## 2024-07-05 PROCEDURE — 93798 PHYS/QHP OP CAR RHAB W/ECG: CPT | Performed by: INTERNAL MEDICINE

## 2024-07-08 ENCOUNTER — DOCUMENTATION (OUTPATIENT)
Dept: CARDIAC REHAB | Facility: CLINIC | Age: 89
End: 2024-07-08

## 2024-07-08 ENCOUNTER — CLINICAL SUPPORT (OUTPATIENT)
Dept: CARDIAC REHAB | Facility: CLINIC | Age: 89
End: 2024-07-08
Payer: MEDICARE

## 2024-07-08 DIAGNOSIS — Z95.2 S/P TAVR (TRANSCATHETER AORTIC VALVE REPLACEMENT): ICD-10-CM

## 2024-07-08 PROCEDURE — 93798 PHYS/QHP OP CAR RHAB W/ECG: CPT | Performed by: INTERNAL MEDICINE

## 2024-07-08 NOTE — PROGRESS NOTES
Cardiac Rehabilitation 90 Day Reassessment    Name: Josee Henson  Medical Record Number: [id number]  YOB: 1935  Age: 89 y.o.    Today’s Date: 7/8/2024  Primary Care Physician: Bashir Smith MD  Referring Physician: Pérez Traylor MD  Program Location: [address]     General  Primary Diagnosis:   TAVR      Onset/Date of Diagnosis: 2/23/2024    Session #31    AACVPR Risk Stratification: High      Falls Risk: Patient has remained free from falls while enrolled in rehab as of 5/17/24.   Psychosocial Assessment     Pre PHQ-9: 3    Sent PH-Q 9 to MD if score > 20: No; score < 20    Pt reported/currently experiencing stress: No  Patient uses stress management skills: No   History of: no history of anxiety or depression  Currently seeing a mental health provider: No  Social Support: Yes, Whom:family  Quality of Life Survey: SF-36   SF-36 Pre Post   Physical Component Score TBD TBD   Mental Component Score TBD TBD     Learning Assessment:  Learning assessment/barriers: None  Preferred learning method: Visual  Barriers: None  Comments:    Stages of Change:Action    Psychosocial Plan    Goal Status: In progress    Psychosocial Goals: Demonstrating proper techniques for stress management and Maintain or lower PH-Q 9 score by discharge    Psychosocial Interventions/Education:   4/29/24 Initial PHQ-9 score reviewed. CV  5/17/24 Report no new stress/cv  6/14/24 No change in psychosocial/cv  7/8/24 No issue.  Feels good about her health. CV    Psychosocial Reassessment: in progress    Nutrition Assessment:    Hyperlipidemia: Yes     Lipids:   Lab Results   Component Value Date    CHOL 179 03/25/2024    HDL 82.6 03/25/2024    LDLF 81 10/27/2022    TRIG 146 03/25/2024       Current Dietary Guidelines: Low fat, Low sodium  Barriers to dietary change: no    Diet Habit Survey: Picture Your Plate  Pre:  53  Post: To be done at discharge.    Diabetes Assessment    Lab Results   Component Value Date    HGBA1C  6.0 (H) 10/26/2023       History of Diabetes: No    Weight Management  Height: 61 inches  Weight: 159.6 lbs  BMI: 30.15     Nutrition Plan    Goal Status: In progress    Nutrition Goals: Improve Diet Habit Survey score by 5-10 points by discharge and Adapt a low-sodium, DASH diet prior to discharge    Nutrition Interventions/Education:   5/1/24 Gave and reviewed Heart Healthy Tips handout. CLINT  6/5/2024  Gave and reviewed handout on Label Reading Tips. Encouraged pt to read labels regularly for sat fat, sodium, fiber, and added sugars. Encouraged to review handout further and follow up with questions as needed.  Signature Nisa Riggs RDN, MARIANA  7/3/24 Cardiac booklet given and lipids reviewed. CV    Nutrition Reassessment: in progress    Exercise Assessment    No  Mode: NA  Frequency: NA  Duration: NA    Exercise Prescription     Exercise Prescription based on: [facility] Activity Status Index (DASI)    DASI Score: 10.7    MET Score: 4.1    Frequency:  3 days/week   Mode: NuStep and Recumbent Cycle   Duration: 34 total aerobic minutes   Intensity: RPE 12-16  Target HR:     MET Level: 2.9  Patient wears supplemental O2: No     Modality Workload METs Duration (minutes)   1 Pre-Exercise   2:00   2 NuStep Load 3@ 40 Begum 2.3 17:00   3 Recumbent Bike Load 4 @ 55 rpms 2.9 17:00   4 Weights    10:00   5 Post-Exercise   2:00     Resistance Training: No   Home Exercise Prescription given:  yes    Exercise Plan    Goal Status: In progress    Exercise Goals: Increase exercise MET level by 5-10% each week, Increase total exercise duration to 30-45 minutes, and Obtain 150 minutes/week of moderate intensity aerobic exercise    Exercise Interventions/Education:   5/15/24 adjusted THR/MS  5/17/24 gave pt home ex rx & log, encouraged pt to exercise on non-rehab days/MS  6/7/24 Weight training with the EP intern.  7/8/24 strength training with EP intern.    Exercise Reassessment: in progress    Other Core Components/Risk Factor  Assessment:    Medication adherence  Current Medications:   Medication Documentation Review Audit       Reviewed by Leonila Bocanegra MD (Physician) on 06/13/24 at 1130      Medication Order Taking? Sig Documenting Provider Last Dose Status   acetaminophen (Tylenol Extra Strength) 500 mg tablet [id number] Yes Take 2 tablets (1,000 mg) by mouth every 8 hours if needed for mild pain (1 - 3), headaches or fever (temp greater than 38.0 C). As directed. Historical MD Tia Taking Active   amoxicillin (Amoxil) 500 mg capsule [id number] Yes TAKE 4 CAPSULES BY MOUTH 1 HOUR BEFORE PROCEDURE Historical Provider, MD Taking Active   aspirin (Ecotrin Low Strength) 81 mg EC tablet [id number] Yes Take 1 tablet (81 mg) by mouth once daily at bedtime. Historical MD Tia Taking Active   atorvastatin (Lipitor) 40 mg tablet [id number] Yes Take 1 tablet (40 mg) by mouth once daily. Bashir Smith MD Taking Active   biotin 5 mg capsule [id number] Yes Take 1 capsule (5 mg) by mouth once daily. Historical MD Tia Taking Active   calcium carbonate-vitamin D3 (Oscal-500) 500 mg-10 mcg (400 unit) tablet [id number] Yes Take 2 tablets by mouth once daily at bedtime. Historical Provider, MD Taking Active   denosumab (Prolia) 60 mg/mL syringe [id number] Yes Inject 1 mL (60 mg) under the skin every 6 months. Historical MD Tia Taking Active   fexofenadine (Allegra Allergy) 180 mg tablet [id number] Yes Take 1 tablet (180 mg) by mouth once daily. Historical MD Tia Taking Active   flaxseed oiL 1,000 mg capsule [id number] Yes Take 1 capsule (1,000 mg) by mouth 2 times a day. Historical MD Tia Taking Active   fluticasone (Flonase) 50 mcg/actuation nasal spray [id number] Yes Administer 2 sprays into each nostril once daily. Historical MD Tia Taking Active   levothyroxine (Synthroid, Levoxyl) 125 mcg tablet [id number] Yes Take 1 tablet (125 mcg) by mouth once daily. Historical MD Tia Taking Active   multivitamin with  minerals tablet [id number] Yes Take 1 tablet by mouth once daily. Historical Provider, MD Taking Active   omeprazole (PriLOSEC) 20 mg DR capsule [id number] Yes Take 1 capsule (20 mg) by mouth once daily. Historical Provider, MD Taking Active   quiNINE (Qualaquin) 324 mg capsule [id number] Yes Take 1 capsule (324 mg) by mouth once daily at bedtime. For leg cramps Historical Provider, MD Taking Active   valsartan (Diovan) 40 mg tablet [id number] Yes Take 1 tablet (40 mg) by mouth 2 times a day. Elisa Hahn, APRN-CNP Taking Active   zolpidem (Ambien) 5 mg tablet [id number] Yes Take 1 tablet (5 mg) by mouth as needed at bedtime for sleep. Bashir Smith MD Taking Active                                 Medication compliance: Yes   Uses pill box/organizer: Yes    Carries medication list: Yes     Blood Pressure Management  History of Hypertension: Yes   Medication Changes: No   Resting BP:  124/64       Heart Failure Management  Hx of Heart Failure: No    Smoking/Tobacco Assessment  Social History     Tobacco Use   Smoking Status Never   Smokeless Tobacco Never       Other Core Component Plan    Goal Status: In progress    Other Core Component Goals: Verbalize medication usage and drug actions by discharge, Achieve resting BP of < 130/80 by discharge    Other Core Component Interventions/Education:   4/29/24 BP readings taken pre and post exercise at each session to ensure proper control.  5/17/24 Reviewed effects of exercise on BP.  6/14/24 Pt compliant with meds.  7/8/24 BP sometimes above target goal.  Pt asked about medication compliance.  Pt states that she is compliant with medications.    Other Core Components Reassessment: in progress    Individual Patient Goals:    Increase strength and endurance to help with stability by discharge  Return to her  personal training to help with strength by discharge    Goal Status: In progress    Staff Comments:  Ms. Henson continues to consistently attend cardiac rehab.  She has  attended 31 sessions.  She is close to graduating from the program.  She is challenged with current workload because of her orthopedic issues but tries her best.  She feels good about her health.  She has no complaint of chest pain and no ectopy was noted on the monitor.      Rehab Staff Signature: Ankur Alicia RN

## 2024-07-09 ENCOUNTER — HOSPITAL ENCOUNTER (OUTPATIENT)
Dept: CARDIOLOGY | Facility: CLINIC | Age: 89
Discharge: HOME | End: 2024-07-09
Payer: MEDICARE

## 2024-07-09 ENCOUNTER — APPOINTMENT (OUTPATIENT)
Dept: INFUSION THERAPY | Facility: CLINIC | Age: 89
End: 2024-07-09
Payer: MEDICARE

## 2024-07-09 VITALS
RESPIRATION RATE: 16 BRPM | HEART RATE: 87 BPM | OXYGEN SATURATION: 96 % | BODY MASS INDEX: 30.43 KG/M2 | SYSTOLIC BLOOD PRESSURE: 171 MMHG | DIASTOLIC BLOOD PRESSURE: 84 MMHG | WEIGHT: 160.94 LBS | TEMPERATURE: 98 F

## 2024-07-09 DIAGNOSIS — M81.0 OSTEOPOROSIS, UNSPECIFIED OSTEOPOROSIS TYPE, UNSPECIFIED PATHOLOGICAL FRACTURE PRESENCE: ICD-10-CM

## 2024-07-09 DIAGNOSIS — Z95.0 PACEMAKER: ICD-10-CM

## 2024-07-09 DIAGNOSIS — I49.5 SICK SINUS SYNDROME DUE TO SA NODE DYSFUNCTION (MULTI): ICD-10-CM

## 2024-07-09 PROCEDURE — 93280 PM DEVICE PROGR EVAL DUAL: CPT | Performed by: INTERNAL MEDICINE

## 2024-07-09 PROCEDURE — 93280 PM DEVICE PROGR EVAL DUAL: CPT

## 2024-07-09 PROCEDURE — 96372 THER/PROPH/DIAG INJ SC/IM: CPT | Performed by: NURSE PRACTITIONER

## 2024-07-09 RX ORDER — FAMOTIDINE 10 MG/ML
20 INJECTION INTRAVENOUS ONCE AS NEEDED
OUTPATIENT
Start: 2024-12-28

## 2024-07-09 RX ORDER — ALBUTEROL SULFATE 0.83 MG/ML
3 SOLUTION RESPIRATORY (INHALATION) AS NEEDED
OUTPATIENT
Start: 2024-12-28

## 2024-07-09 RX ORDER — DIPHENHYDRAMINE HYDROCHLORIDE 50 MG/ML
50 INJECTION INTRAMUSCULAR; INTRAVENOUS AS NEEDED
OUTPATIENT
Start: 2024-12-28

## 2024-07-09 RX ORDER — EPINEPHRINE 0.3 MG/.3ML
0.3 INJECTION SUBCUTANEOUS EVERY 5 MIN PRN
OUTPATIENT
Start: 2024-12-28

## 2024-07-09 ASSESSMENT — ENCOUNTER SYMPTOMS
WHEEZING: 0
LEG SWELLING: 0
SHORTNESS OF BREATH: 0
WOUND: 0
PALPITATIONS: 0
EXTREMITY WEAKNESS: 0
NUMBNESS: 0
DIZZINESS: 0
LIGHT-HEADEDNESS: 0
COUGH: 0

## 2024-07-09 NOTE — PROGRESS NOTES
[facility]   Infusion Clinic Note   Date: 2024   Name: Josee Henson  : 1935   MRN: [id number]         Reason for Visit: Injections (Every 6 months Prolia 60mg subcutaneous injection)         Today: We administered denosumab.       Visit Type: INJECTION       Ordered By: Leonila Bocanegra MD      Accompanied by:Self      Diagnosis: Osteoporosis, unspecified osteoporosis type, unspecified pathological fracture presence       Allergies:   Allergies as of 2024 - Reviewed 2024   Allergen Reaction Noted   • Tetracycline Rash and Unknown 2023         Current Medications has a current medication list which includes the following prescription(s): acetaminophen, amoxicillin, aspirin, atorvastatin, biotin, calcium carbonate-vitamin d3, prolia, fexofenadine, flaxseed oil, fluticasone, levothyroxine, multivitamin with minerals, omeprazole, quinine, valsartan, and zolpidem.       Vitals:   Vitals:    24 1314   BP: 171/84   Pulse: 87   Resp: 16   Temp: 36.7 °C (98 °F)   TempSrc: Temporal   SpO2: 96%   Weight: 73 kg (160 lb 15 oz)             Infusion Pre-procedure Checklist:   - Allergies reviewed: yes   - Medications reviewed: yes       - Previous reaction to current treatment: no      Assess patient for the concerns below. Document provider notification as appropriate.  - Active or recent infection with/without current antibiotic use: no  - Recent or planned invasive dental work: no  - Recent or planned surgeries: no  - Recently received or plans to receive vaccinations: no  - Has treatment related toxicities: no  - Is pregnant:  no      Pain: 0   - Is the pain different from normal: n/a   - Is the pain tolerable: n/a   - Is your Doctor aware:  n/a               Fall Risk Screening: Estrada Fall Risk  History of Falling, Immediate or Within 3 Months: No  Ambulatory Aid: Crutches/cane/walker (Using quad cane)  Intravenous Therapy/Heparin Lock: No  Gait/Transferring:  Normal/bedrest/immobile  Mental Status: Oriented to own ability       Review Of Systems:  Review of Systems   Respiratory:  Negative for cough, shortness of breath and wheezing.    Cardiovascular:  Negative for chest pain, leg swelling and palpitations.        H/O pacemaker   Skin:  Negative for itching, rash and wound.   Neurological:  Negative for dizziness, extremity weakness, light-headedness and numbness.         Infusion Readiness:   - Assessment Concerns Related to Infusion: No  - Provider notified: n/a      Document Below Only If Indicated:   New Patient Education:    N/A (returning patient for continuation of therapy. Ongoing education provided as needed.)        Treatment Conditions & Drug Specific Questions:    Denosumab  (PROLIA. XGEVA)    (Unless otherwise specified on patient specific therapy plan):     TREATMENT CONDITIONS:  Unless otherwise specified on patient specific therapy plan HOLD and notify provider prior to proceeding with today's injection if patients:  o Calcium is LESS THAN 8.6 mg/dL OR  Ionized Calcium LESS THAN 1.1 mmol/L  o Recent or planned invasive dental procedure (within 4 weeks)    Lab Results   Component Value Date    CALCIUM 10.4 06/11/2024    PHOS 3.4 02/27/2024      Labs reviewed and patient meets treatment conditions? Yes    DRUG SPECIFIC QUESTIONS:  Is the patient taking calcium and vitamin D? Yes  (Recommended)    Pt Instructed on following risks: (1) hypocalcemia, (2) osteonecrosis of the jaw, (3) atypical femoral fractures, (4) serious infections, and (5) dermatologic reactions?  Yes      REMINDER:  PREGNANCY CATEGORY X DRUG. OBTAIN NEGTATIVE PREGNANCY TEST PRIOR TO FIRST INFUSION FOR WOMEN OF CHILDBEARING ABILITY   REMS DRUG    Recommended Vitals/Observation:  Vitals: Obtain vitals prior to injection.  Observation: Patient may leave immediately following injection.        Weight Based Drug Calculations:    WEIGHT BASED DRUGS: NOT APPLICABLE / FLAT DOSE          Initiated  By: Sarahi Wild LPN

## 2024-07-10 ENCOUNTER — CLINICAL SUPPORT (OUTPATIENT)
Dept: CARDIAC REHAB | Facility: CLINIC | Age: 89
End: 2024-07-10
Payer: MEDICARE

## 2024-07-10 DIAGNOSIS — Z95.2 S/P TAVR (TRANSCATHETER AORTIC VALVE REPLACEMENT): ICD-10-CM

## 2024-07-10 PROCEDURE — 93798 PHYS/QHP OP CAR RHAB W/ECG: CPT

## 2024-07-11 ENCOUNTER — OFFICE VISIT (OUTPATIENT)
Dept: CARDIOLOGY | Facility: HOSPITAL | Age: 89
End: 2024-07-11
Payer: MEDICARE

## 2024-07-11 ENCOUNTER — LAB (OUTPATIENT)
Dept: LAB | Facility: LAB | Age: 89
End: 2024-07-11
Payer: MEDICARE

## 2024-07-11 VITALS
SYSTOLIC BLOOD PRESSURE: 140 MMHG | WEIGHT: 160 LBS | HEART RATE: 82 BPM | BODY MASS INDEX: 30.21 KG/M2 | HEIGHT: 61 IN | DIASTOLIC BLOOD PRESSURE: 70 MMHG

## 2024-07-11 DIAGNOSIS — D64.9 ANEMIA, UNSPECIFIED TYPE: Primary | ICD-10-CM

## 2024-07-11 DIAGNOSIS — D64.9 ANEMIA, UNSPECIFIED TYPE: ICD-10-CM

## 2024-07-11 DIAGNOSIS — Z95.2 S/P TAVR (TRANSCATHETER AORTIC VALVE REPLACEMENT): ICD-10-CM

## 2024-07-11 DIAGNOSIS — I10 PRIMARY HYPERTENSION: ICD-10-CM

## 2024-07-11 DIAGNOSIS — E78.5 HYPERLIPIDEMIA, UNSPECIFIED HYPERLIPIDEMIA TYPE: ICD-10-CM

## 2024-07-11 LAB
ERYTHROCYTE [DISTWIDTH] IN BLOOD BY AUTOMATED COUNT: 15.5 % (ref 11.5–14.5)
HCT VFR BLD AUTO: 38 % (ref 36–46)
HGB BLD-MCNC: 11.9 G/DL (ref 12–16)
MCH RBC QN AUTO: 27.7 PG (ref 26–34)
MCHC RBC AUTO-ENTMCNC: 31.3 G/DL (ref 32–36)
MCV RBC AUTO: 88 FL (ref 80–100)
NRBC BLD-RTO: 0 /100 WBCS (ref 0–0)
PLATELET # BLD AUTO: 204 X10*3/UL (ref 150–450)
RBC # BLD AUTO: 4.3 X10*6/UL (ref 4–5.2)
WBC # BLD AUTO: 6.3 X10*3/UL (ref 4.4–11.3)

## 2024-07-11 PROCEDURE — 99214 OFFICE O/P EST MOD 30 MIN: CPT | Performed by: NURSE PRACTITIONER

## 2024-07-11 PROCEDURE — 36415 COLL VENOUS BLD VENIPUNCTURE: CPT

## 2024-07-11 PROCEDURE — 1160F RVW MEDS BY RX/DR IN RCRD: CPT | Performed by: NURSE PRACTITIONER

## 2024-07-11 PROCEDURE — 3077F SYST BP >= 140 MM HG: CPT | Performed by: NURSE PRACTITIONER

## 2024-07-11 PROCEDURE — 85027 COMPLETE CBC AUTOMATED: CPT

## 2024-07-11 PROCEDURE — 1159F MED LIST DOCD IN RCRD: CPT | Performed by: NURSE PRACTITIONER

## 2024-07-11 PROCEDURE — 3078F DIAST BP <80 MM HG: CPT | Performed by: NURSE PRACTITIONER

## 2024-07-11 NOTE — PROGRESS NOTES
"Primary Care Physician: Bashir Smith MD  Date of Visit: 07/11/2024 11:00 AM EDT  Location of visit: [facility]     Chief Complaint:   Chief Complaint   Patient presents with    Follow-up        HPI / Summary:   Josee Henson is a 89 y.o. female presents for followup. Seen in collaboration with Dr. Traylor. She is overall feeling well. She has been participating in cardiac rehab without chest pain or dyspnea. She does state when lying down at night feeling her heart \" pounding.\" Denies any racing heart beats.  Denies chest pain, dyspnea, orthopnea, pnd, lightheadedness, dizziness, syncope, palpitations, or bleeding issues.                Past Medical History:  Past Medical History:   Diagnosis Date    Aortic stenosis     Hyperlipidemia     Hypertension         Past Surgical History:  Past Surgical History:   Procedure Laterality Date    CARDIAC CATHETERIZATION N/A 2/2/2024    Procedure: Left And Right Heart Cath, With LV;  Surgeon: Pérez Traylor MD;  Location: [facility] Cardiac Cath Lab;  Service: Cardiovascular;  Laterality: N/A;  Hydration needed @ 8:30 AM    CARDIAC CATHETERIZATION N/A 2/23/2024    Procedure: TAVR (Transcatheter AV Replacement);  Surgeon: Braydon Correa MD;  Location: [place] [address] Cardiac Cath Lab;  Service: Cardiovascular;  Laterality: N/A;  Medtronic, Evolut FX 26    CARDIAC CATHETERIZATION N/A 2/23/2024    Procedure: TVP for TAVR;  Surgeon: Braydon Correa MD;  Location: [place] [address] Cardiac Cath Lab;  Service: Cardiovascular;  Laterality: N/A;    CARDIAC CATHETERIZATION N/A 2/23/2024    Procedure: TAVR (Transcatheter AV Replacement);  Surgeon: Brandi Serrano MD;  Location: [place] [address] Cardiac Cath Lab;  Service: Cardiac Surgery;  Laterality: N/A;    CARDIAC ELECTROPHYSIOLOGY PROCEDURE Left 2/26/2024    Procedure: PPM IMPLANT DUAL;  Surgeon: Marie Bishop MD;  Location: [place] [address] Cardiac Cath Lab;  Service: Electrophysiology;  Laterality: Left;  [employer] company will be " determined by either Dr. Douglas or Dr. Bishop.    COLONOSCOPY  04/15/2014    Complete Colonoscopy    OTHER SURGICAL HISTORY  04/15/2014    Revision Of Total Knee Arthroplasty    TOTAL KNEE ARTHROPLASTY  04/15/2014    Total Knee Arthroplasty          Social History:   reports that she has never smoked. She has never used smokeless tobacco. She reports current alcohol use of about 1.0 standard drink of alcohol per week. She reports that she does not use drugs.     Family History:  family history includes Alzheimer's disease (age of onset: 83) in her mother; Diabetes (age of onset: 69) in her father; Heart attack in her father; Leukemia in her father; ovarian mass in her mother.      Allergies:  Allergies   Allergen Reactions    Tetracycline Rash and Unknown       Outpatient Medications:  Current Outpatient Medications   Medication Instructions    acetaminophen (TYLENOL EXTRA STRENGTH) 1,000 mg, oral, Every 8 hours PRN, As directed.    amoxicillin (Amoxil) 500 mg capsule TAKE 4 CAPSULES BY MOUTH 1 HOUR BEFORE PROCEDURE    aspirin (Ecotrin Low Strength) 81 mg EC tablet 1 tablet, oral, Nightly    atorvastatin (LIPITOR) 40 mg, oral, Daily    biotin 5 mg capsule 1 capsule, oral, Daily    calcium carbonate-vitamin D3 (Oscal-500) 500 mg-10 mcg (400 unit) tablet 2 tablets, oral, Nightly    denosumab (Prolia) 60 mg/mL syringe 1 mL, subcutaneous, Every 6 months    fexofenadine (ALLEGRA ALLERGY) 180 mg, oral, Daily    flaxseed oiL 1,000 mg capsule 1 capsule, oral, 2 times daily    fluticasone (Flonase) 50 mcg/actuation nasal spray 2 sprays, Each Nostril, Daily    levothyroxine (SYNTHROID, LEVOXYL) 137 mcg, oral, Daily    multivitamin with minerals tablet 1 tablet, oral, Daily    omeprazole (PriLOSEC) 20 mg DR capsule 1 capsule, oral, Daily    quiNINE (Qualaquin) 324 mg capsule 1 capsule, oral, Nightly, For leg cramps    valsartan (DIOVAN) 40 mg, oral, 2 times daily    zolpidem (AMBIEN) 5 mg, oral, Nightly PRN       Physical  "Exam:  Vitals:    07/11/24 1117   BP: 177/80   BP Location: Right arm   Pulse: 82   Weight: 72.6 kg (160 lb)   Height: 1.549 m (5' 1\")     Wt Readings from Last 5 Encounters:   07/11/24 72.6 kg (160 lb)   07/09/24 73 kg (160 lb 15 oz)   06/13/24 72.1 kg (159 lb)   04/23/24 72.6 kg (160 lb)   03/07/24 72.6 kg (160 lb)     Body mass index is 30.23 kg/m².     GENERAL: alert, cooperative, pleasant, in no acute distress  SKIN: warm and dry.  NECK: Normal JVD, negative HJR  CARDIAC: Regular rate and rhythm with 2/6 early peaking systolic murmur, no rubs or gallops  CHEST: Normal respiratory efforts, lungs clear to auscultation bilaterally.  ABDOMEN: soft, nontender, nondistended  EXTREMITIES: no edema, +2 palpable RP bilaterally.          Last Labs:  Recent Labs     04/09/24  1337 03/12/24  1415 03/04/24  1146   WBC 6.7 6.7 8.4   HGB 10.9* 9.5* 9.1*   HCT 35.2* 31.2* 28.3*    239 226   MCV 90 90 90     Recent Labs     06/11/24  1224 04/09/24  1337 03/04/24  1146    139 142   K 4.5 4.5 4.5    102 104   BUN 32* 36* 34*   CREATININE 1.57* 1.61* 1.69*     CMP -  Lab Results   Component Value Date    CALCIUM 10.4 06/11/2024    PHOS 3.4 02/27/2024    PROT 6.1 (L) 06/11/2024    ALBUMIN 4.0 06/11/2024    AST 22 06/11/2024    ALT 15 06/11/2024    ALKPHOS 88 06/11/2024    BILITOT 0.4 06/11/2024       LIPID PANEL -   Lab Results   Component Value Date    CHOL 179 03/25/2024    HDL 82.6 03/25/2024    LDLF 81 10/27/2022    TRIG 146 03/25/2024       Lab Results   Component Value Date    HGBA1C 6.0 (H) 10/26/2023       Last Cardiology Tests:  ECG:  Reviewed EKG from 3/7/24- NSR HR 66    Echo:  Echo Results:  Transthoracic Echo (TTE) Limited With Doppler, Color And Contrast 02/24/2024    [facility], [address] [place] [address]  Tel [phone] and Fax [phone]    TRANSTHORACIC ECHOCARDIOGRAM REPORT      Patient Name:      JAIRO LEA     Reading Physician:    [id number] " Ortiz Napoles MD  Study Date:        2/24/2024            Ordering Provider:    [id number] ASHLEE HOWE  MRN/PID:           [id number]             Fellow:  Accession#:        [id number]         Nurse:  Date of Birth/Age: 1935 / 89 years Sonographer:          Gray Lei RDCS  Gender:            F                    Additional Staff:  Height:            152.40 cm            Admit Date:           2/23/2024  Weight:            72.58 kg             Admission Status:     Inpatient -  Routine  BSA / BMI:         1.70 m2 / 31.25      Encounter#:           [id number]  kg/m2  Department Location:  [facility]  Blood Pressure: 133 /51 mmHg    Study Type:    TRANSTHORACIC ECHO (TTE) LIMITED  Diagnosis/ICD: Presence of prosthetic heart valve-Z95.2  Indication:    S/P TAVR  CPT Code:      Echo Limited-80510; Doppler Limited-22282; Color Doppler-68270    Patient History:  Pertinent         HTN; HLD; murmur; CKD; AS s/p TAVR (26mm Evolut FX,  History:          done:2/23/24).    Study Detail: The following Echo studies were performed: 2D, M-Mode, Doppler and  color flow. Technically challenging study due to body habitus and  poor acoustic windows. Definity used as a contrast agent for  endocardial border definition. Total contrast used for this  procedure was 2.0 mL via IV push.      PHYSICIAN INTERPRETATION:  Left Ventricle: The left ventricular systolic function is hyperdynamic, with an estimated ejection fraction of 70-75%. There are no regional wall motion abnormalities. The left ventricular cavity size is normal. Left ventricular diastolic filling was not assessed.  Left Atrium: The left atrium is normal in size.  Right Ventricle: The right ventricle is normal in size. There is normal right ventricular global systolic function.  Right Atrium: The right atrium was not well visualized.  Aortic Valve: The aortic valve appears abnormal. There is a Medtronic transcatheter aortic valve replacement, with a 29 mm  reported size. Echo findings are consistent with normal aortic valve prosthesis structure and function. There is no evidence of aortic valve regurgitation. The peak instantaneous gradient of the aortic valve is 16.5 mmHg. The mean gradient of the aortic valve is 8.0 mmHg.  Mitral Valve: The mitral valve is normal in structure. There is no evidence of mitral valve regurgitation.  Tricuspid Valve: The tricuspid valve was not well visualized. Tricuspid regurgitation was not assessed.  Pulmonic Valve: The pulmonic valve is not well visualized. There is physiologic pulmonic valve regurgitation.  Pericardium: There is a trivial pericardial effusion.  Aorta: The aortic root is normal.      CONCLUSIONS:  1. Left ventricular systolic function is hyperdynamic with a 70-75% estimated ejection fraction.  2. Aortic valve appears abnormal.  3. There is a transcatheter aortic valve replacement.    QUANTITATIVE DATA SUMMARY:  2D MEASUREMENTS:  Normal Ranges:  Ao Root d:     3.10 cm    (2.0-3.7cm)  LAs:           4.40 cm    (2.7-4.0cm)  IVSd:          1.20 cm    (0.6-1.1cm)  LVPWd:         1.20 cm    (0.6-1.1cm)  LVIDd:         5.00 cm    (3.9-5.9cm)  LVIDs:         3.10 cm  LV Mass Index: 137.7 g/m2  LV % FS        38.0 %    LA VOLUME:  Normal Ranges:  LA Volume Index: 18.8 ml/m2    LV SYSTOLIC FUNCTION BY 2D PLANIMETRY (MOD):  Normal Ranges:  EF-A4C View: 77.8 % (>=55%)  EF-A2C View: 72.2 %  EF-Biplane:  75.5 %    LV DIASTOLIC FUNCTION:  Normal Ranges:  MV Peak E:        0.56 m/s   (0.7-1.2 m/s)  MV Peak A:        0.78 m/s   (0.42-0.7 m/s)  E/A Ratio:        0.72       (1.0-2.2)  MV e'             0.06 m/s   (>8.0)  E/e' Ratio:       9.55       (<8.0)  MV DT:            275 msec   (150-240 msec)  PulmV Sys Biju:    48.00 cm/s  PulmV Galan Biju:   47.10 cm/s  PulmV S/D Biju:    1.00  PulmV A Revs Biju: 28.30 cm/s    MITRAL VALVE:  Normal Ranges:  MV DT: 275 msec (150-240msec)    AORTIC VALVE:  Normal Ranges:  AoV Vmax:                 2.03 m/s  (<=1.7m/s)  AoV Peak P.5 mmHg (<20mmHg)  AoV Mean P.0 mmHg  (1.7-11.5mmHg)  LVOT Max Biju:            1.29 m/s  (<=1.1m/s)  AoV VTI:                 35.30 cm  (18-25cm)  LVOT VTI:                25.10 cm  LVOT Diameter:           1.80 cm   (1.8-2.4cm)  AoV Area, VTI:           1.81 cm2  (2.5-5.5cm2)  AoV Area,Vmax:           1.62 cm2  (2.5-4.5cm2)  AoV Dimensionless Index: 0.71      RIGHT VENTRICLE:  TAPSE: 18.2 mm  RV s'  0.16 m/s    Pulmonary Veins:  PulmV A Revs Biju: 28.30 cm/s  PulmV Galan Biju:   47.10 cm/s  PulmV S/D Biju:    1.00  PulmV Sys Biju:    48.00 cm/s      [id number] Ortiz Napoles MD  Electronically signed on 2024 at 1:31:50 PM        ** Final **      Transthoracic Echo (TTE) Limited With Doppler And Color 2024    [facility], [address] [place] [address]  Tel [phone] and Fax [phone]    TRANSTHORACIC ECHOCARDIOGRAM REPORT      Patient Name:      JAIRO Chavira Physician:    [id number] Moise Jimenez MD  Study Date:        2024            Ordering Provider:    [id number] LOPEZ JUAREZ  MRN/PID:           [id number]             Fellow:  Accession#:        [id number]         Nurse:  Date of Birth/Age: 1935 / 89 years Sonographer:          BIBIANA Stone RDCS  Gender:            F                    Additional Staff:  Height:            154.94 cm            Admit Date:           2024  Weight:            72.58 kg             Admission Status:     Inpatient -  Routine  BSA / BMI:         1.72 m2 / 30.23      Encounter#:           [id number]  kg/m2  Department Location:  [facility]  Cath Lab  Blood Pressure: 141 /63 mmHg    Study Type:    TRANSTHORACIC ECHO (TTE) LIMITED  Diagnosis/ICD: Nonrheumatic aortic (valve) stenosis-I35.0  Indication:    TAVR Periprocedure  CPT Code:      Echo Limited-43314; Doppler Limited-93911; Color Doppler-58672    Patient History:  Pertinent History: HTN,  Hyperlipidemia and Murmur. CKD, AS.    Study Detail: The following Echo studies were performed: 2D, M-Mode, Doppler and  color flow. Technically challenging study due to patient lying in  supine position and body habitus.      PHYSICIAN INTERPRETATION:  Left Ventricle: The left ventricular systolic function is normal, with an estimated ejection fraction of 70%. There are no regional wall motion abnormalities. The left ventricular cavity size is normal. Spectral Doppler shows a normal pattern of left ventricular diastolic filling. There is concentric LVH.  Left Atrium: The left atrium is normal in size.  Right Ventricle: The right ventricle is normal in size. There is normal right ventricular global systolic function.  Right Atrium: The right atrium is normal in size.  Aortic Valve: The aortic valve is probably trileaflet. There is moderate to severe aortic valve cusp calcification. The aortic valve dimensionless index is 0.32. There is no evidence of aortic valve regurgitation. The peak instantaneous gradient of the aortic valve is 39.4 mmHg. The mean gradient of the aortic valve is 22.0 mmHg.  Mitral Valve: The mitral valve is normal in structure. There is mild to moderate mitral valve regurgitation.  Tricuspid Valve: The tricuspid valve is structurally normal. There is trace to mild tricuspid regurgitation. The Doppler estimated RVSP is mildly elevated at 35.5 mmHg.  Pulmonic Valve: The pulmonic valve is not well visualized. The pulmonic valve regurgitation was not well visualized.  Pericardium: There is no pericardial effusion noted.  Aorta: The aortic root is normal.    Post Transcatheter Aortic Valve Placement (TAVR):  The peak instantaneous gradient of the aortic valve is 12.4 mmHg. The mean gradient of the aortic valve is 5.0 mmHg. There is a Medtronic transcatheter aortic valve replacement, with a 26 mm reported size. There is no mckenna-prosthetic aortic valve regurgitation.      CONCLUSIONS:  1. Left  ventricular systolic function is normal with a 70% estimated ejection fraction.  2. Poorly visualized anatomical structures due to suboptimal image quality.  3. Mild to moderate mitral valve regurgitation.  4. Mildly elevated RVSP.  5. There is moderate to severe aortic valve cusp calcification.    QUANTITATIVE DATA SUMMARY:  2D MEASUREMENTS:  Normal Ranges:  Ao Root d:     2.80 cm   (2.0-3.7cm)  LAs:           4.00 cm   (2.7-4.0cm)  IVSd:          0.80 cm   (0.6-1.1cm)  LVPWd:         0.80 cm   (0.6-1.1cm)  LVIDd:         4.90 cm   (3.9-5.9cm)  LVIDs:         3.10 cm  LV Mass Index: 76.4 g/m2  LV % FS        36.7 %    LA VOLUME:  Normal Ranges:  LA Vol A4C:        55.8 ml    (22+/-6mL/m2)  LA Vol A2C:        46.8 ml  LA Vol BP:         51.2 ml  LA Vol Index A4C:  32.5ml/m2  LA Vol Index A2C:  27.2 ml/m2  LA Vol Index BP:   29.8 ml/m2  LA Area A4C:       18.7 cm2  LA Area A2C:       17.1 cm2  LA Major Axis A4C: 5.3 cm  LA Major Axis A2C: 5.3 cm  LA Volume Index:   29.8 ml/m2    AORTA MEASUREMENTS:  Normal Ranges:  Asc Ao, d: 2.80 cm (2.1-3.4cm)    LV SYSTOLIC FUNCTION BY 2D PLANIMETRY (MOD):  Normal Ranges:  EF-A4C View: 78.6 % (>=55%)  EF-A2C View: 69.6 %  EF-Biplane:  75.1 %    LV DIASTOLIC FUNCTION:  Normal Ranges:  MV Peak E:    0.61 m/s    (0.7-1.2 m/s)  MV Peak A:    0.56 m/s    (0.42-0.7 m/s)  E/A Ratio:    1.09        (1.0-2.2)  MV e'         0.10 m/s    (>8.0)  MV lateral e' 0.12 m/s  MV medial e'  0.07 m/s  MV A Dur:     129.00 msec  E/e' Ratio:   6.45        (<8.0)  MV DT:        269 msec    (150-240 msec)    MITRAL VALVE:  Normal Ranges:  MV DT: 269 msec (150-240msec)    AORTIC VALVE:  Normal Ranges:  AoV Vmax:                          3.14 m/s  (<=1.7m/s)  AoV Vmax Post TAVR:                1.76 m/s  (<=1.7m/s)  AoV Peak P.4 mmHg (<20mmHg)  AoV Peak PG Post TAVR:             12.4 mmHg (<20mmHg)  AoV Mean P.0 mmHg (1.7-11.5mmHg)  AoV Mean PG Post  TAVR:             5.0 mmHg  (1.7-11.5mmHg)  LVOT Max Biju:                      1.14 m/s  (<=1.1m/s)  LVOT Max Biju Post TAVR:            1.14 m/s  (<=1.1m/s)  AoV VTI:                           73.40 cm  (18-25cm)  AoV VTI Post TAVR:                 34.40 cm  (18-25cm)  LVOT VTI:                          23.80 cm  LVOT VTI Post TAVR:                23.80 cm  LVOT Diameter:                     1.80 cm   (1.8-2.4cm)  LVOT Diameter Post TAVR:           1.80 cm   (1.8-2.4cm)  AoV Area, VTI:                     0.83 cm2  (2.5-5.5cm2)  AoV Area, VTI Post TAVR:           1.76 cm2  (2.5-5.5cm2)  AoV Area,Vmax:                     0.92 cm2  (2.5-4.5cm2)  AoV Area,Vmax Post TAVR:           1.65 cm2  (2.5-4.5cm2)  AoV Dimensionless Index:           0.32  AoV Dimensionless Index Post TAVR: 0.69      RIGHT VENTRICLE:  TAPSE: 19.3 mm  RV s'  0.11 m/s    TRICUSPID VALVE/RVSP:  Normal Ranges:  Peak TR Velocity: 2.85 m/s  RV Syst Pressure: 35.5 mmHg (< 30mmHg)  IVC Diam:         1.14 cm      [id number] Moise Jimenez MD  Electronically signed on 2/23/2024 at 5:40:09 PM        ** Final **      Transthoracic Echo (TTE) Complete 11/30/2023    [facility], [address] [place] [address]  Tel [phone] and Fax [phone]    TRANSTHORACIC ECHOCARDIOGRAM REPORT      Patient Name:     JAIRO Chavira Physician:  [id number] Stovall DO  Study Date:       11/30/2023          Ordering Provider:  [id number] ALAYNA CARTY  MRN/PID:          [id number]            Fellow:  Accession#:       [id number]        Nurse:  Date of           1935 / 88      Sonographer:        Carlos Gutierrez RDMS  Birth/Age:        years  Gender:           F                   Additional Staff:   Lena Casanova RD  Height:           154.00 cm           Admit Date:  Weight:           74.00 kg            Admission Status:   Outpatient  BSA:              1.72 m2             Encounter#:         [id number]  Department          [facility]  Non  Location:           Invasive  Blood Pressure: 122 /78 mmHg    Study Type:    TRANSTHORACIC ECHO (TTE) COMPLETE  Diagnosis/ICD: Nonrheumatic aortic (valve) stenosis-I35.0  Indication:    AS  CPT Code:      Echo Complete w Full Doppler-09341    Patient History:  Pertinent History: HTN.    Study Detail: The following Echo studies were performed: 2D, M-Mode, Doppler and  color flow.      PHYSICIAN INTERPRETATION:  Left Ventricle: The left ventricular systolic function is normal, with an estimated ejection fraction of 60%. There are no regional wall motion abnormalities. The left ventricular cavity size is normal. Spectral Doppler shows a pseudonormal pattern of left ventricular diastolic filling.  Left Atrium: The left atrium is upper limits of normal in size.  Right Ventricle: The right ventricle is normal in size. There is normal right ventricular global systolic function.  Right Atrium: The right atrium is normal in size.  Aortic Valve: The aortic valve is probably trileaflet. There is moderate to severe aortic valve cusp calcification. There is evidence of moderate to severe aortic valve stenosis.  There is low flow across the aortic valve, with a normal ejection fraction. The aortic valve dimensionless index is 0.23. There is no evidence of aortic valve regurgitation. The peak instantaneous gradient of the aortic valve is 50.1 mmHg. The mean gradient of the aortic valve is 29.0 mmHg.  Mitral Valve: The mitral valve is mildly thickened. There is mild mitral valve regurgitation.  Tricuspid Valve: The tricuspid valve is structurally normal. No evidence of tricuspid regurgitation.  Pulmonic Valve: The pulmonic valve is structurally normal. There is no indication of pulmonic valve regurgitation.  Pericardium: There is no pericardial effusion noted.  Aorta: The aortic root is normal.  In comparison to the previous echocardiogram(s): Compared with study from 11/8/2022, dimensionless index 0.23 down from 0.34  suggesting more severe aortic stenosis. Clinical correlation suggested.      CONCLUSIONS:  1. Left ventricular systolic function is normal with a 60% estimated ejection fraction.  2. Spectral Doppler shows a pseudonormal pattern of left ventricular diastolic filling.  3. Moderate to severe aortic valve stenosis. pk/mn 50/29 mmHg, DI 0.23, ADITYA 0.6 suggests low flow low gradient severe type.  4. There is moderate to severe aortic valve cusp calcification.  5. Compared with study from 11/8/2022, dimensionless index 0.23 down from 0.34 suggesting more severe aortic stenosis. Clinical correlation suggested.    QUANTITATIVE DATA SUMMARY:  2D MEASUREMENTS:  Normal Ranges:  IVSd:          1.50 cm    (0.6-1.1cm)  LVPWd:         1.40 cm    (0.6-1.1cm)  LVIDd:         4.00 cm    (3.9-5.9cm)  LVIDs:         2.70 cm  LV Mass Index: 127.9 g/m2  LV % FS        32.5 %    LA VOLUME:  Normal Ranges:  LA Vol A4C:        65.0 ml    (22+/-6mL/m2)  LA Vol A2C:        55.9 ml  LA Vol BP:         63.0 ml  LA Vol Index A4C:  37.7ml/m2  LA Vol Index A2C:  32.4 ml/m2  LA Vol Index BP:   36.5 ml/m2  LA Area A4C:       19.5 cm2  LA Area A2C:       18.9 cm2  LA Major Axis A4C: 5.0 cm  LA Major Axis A2C: 5.4 cm  LA Volume Index:   36.5 ml/m2    RA VOLUME BY A/L METHOD:  Normal Ranges:  RA Vol A4C:        35.2 ml    (8.3-19.5ml)  RA Vol Index A4C:  20.4 ml/m2  RA Area A4C:       15.3 cm2  RA Major Axis A4C: 5.7 cm    M-MODE MEASUREMENTS:  Normal Ranges:  Ao Root: 2.50 cm (2.0-3.7cm)  LAs:     5.20 cm (2.7-4.0cm)    AORTA MEASUREMENTS:  Normal Ranges:  Ao Sinus, d: 2.57 cm (2.1-3.5cm)  Ao STJ, d:   1.83 cm (1.7-3.4cm)  Asc Ao, d:   3.20 cm (2.1-3.4cm)    LV SYSTOLIC FUNCTION BY 2D PLANIMETRY (MOD):  Normal Ranges:  EF-A4C View: 66.7 % (>=55%)  EF-A2C View: 69.9 %  EF-Biplane:  69.3 %    LV DIASTOLIC FUNCTION:  Normal Ranges:  MV Peak E:        0.66 m/s    (0.7-1.2 m/s)  MV Peak A:        0.71 m/s    (0.42-0.7 m/s)  E/A Ratio:        0.92         (1.0-2.2)  MV lateral e'     0.13 m/s  MV medial e'      0.06 m/s  E/e' Ratio:       5.00        (<8.0)  PulmV Sys Biju:    60.20 cm/s  PulmV Galan Biju:   65.20 cm/s  PulmV S/D Biju:    0.90  PulmV A Revs Biju: 20.70 cm/s  PulmV A Revs Dur: 109.00 msec    MITRAL VALVE:  Normal Ranges:  MV DT: 387 msec (150-240msec)    AORTIC VALVE:  Normal Ranges:  AoV Vmax:                3.54 m/s  (<=1.7m/s)  AoV Peak P.1 mmHg (<20mmHg)  AoV Mean P.0 mmHg (1.7-11.5mmHg)  LVOT Max Biju:            0.94 m/s  (<=1.1m/s)  AoV VTI:                 90.80 cm  (18-25cm)  LVOT VTI:                21.20 cm  LVOT Diameter:           1.74 cm   (1.8-2.4cm)  AoV Area, VTI:           0.56 cm2  (2.5-5.5cm2)  AoV Area,Vmax:           0.63 cm2  (2.5-4.5cm2)  AoV Dimensionless Index: 0.23      RIGHT VENTRICLE:  RV Basal 3.14 cm  RV Mid   2.37 cm  RV Major 4.8 cm  TAPSE:   23.3 mm  RV s'    0.13 m/s    TRICUSPID VALVE/RVSP:  Normal Ranges:  Peak TR Velocity: 2.88 m/s  Est. RA Pressure: 3 mmHg  RV Syst Pressure: 36.2 mmHg (< 30mmHg)  IVC Diam:         1.06 cm    PULMONIC VALVE:  Normal Ranges:  PV Max Biju: 1.8 m/s   (0.6-0.9m/s)  PV Max P.7 mmHg    Pulmonary Veins:  PulmV A Revs Dur: 109.00 msec  PulmV A Revs Biju: 20.70 cm/s  PulmV Galan Biju:   65.20 cm/s  PulmV S/D Biju:    0.90  PulmV Sys Biju:    60.20 cm/s      [id number] Lazaro Stovall DO  Electronically signed on 2023 at 5:12:13 PM        ** Final **         Cath:  24  Coronary Lesion Summary:  Vessel            Stenosis      Vessel Segment  Left Main    10 to 30% stenosis     distal  LAD          10 to 30% stenosis    proximal  1st Diagonal    80% stenosis       proximal  Circumflex      30% stenosis        ostial  RCA          10 to 30% stenosis proximal to mid    CONCLUSIONS:   1. 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      Cardiac Imaging:  Electrophysiology procedure  Dual chamber pacemaker implantation    Procedures:  Implant of dual chamber  PPM ([id number]),     Patient history:  Please refer to the detailed history and physical on the patient's medical   chart.     Procedure narrative:  The patient was in the fasting state. A grounding pad was placed. The   patient was set up for continuous monitoring of surface 12 lead ECG and   pulse oximetry. Blood pressure was monitored. The procedure was performed   under IV conscious sedation supplemented with intermittent moderate   sedation. The Left upper chest was prepped and draped in the usual sterile   fashion. Local anesthesia: After preoperative IV antibiotic was completely   infused, subcutaneous tissues just medial to the Left deltopectoral area,   were infiltrated with  0.25% bupivicaine 30 cc  for local anesthesia.   An incision was made in the infraclavicular region which was extended to   the left prepectoral fascia using blunt dissection. A pulse generator   pocket was created.  A venogram was obtained using 10cc of contrast via the left arm peripheral   IV. The images were used to guide access. Under Fluoroscopic a 18G Cook   needle was used to access the Left Axillary vein using Seldinger   technique.    A 7F sheath was placed over of the guidewire. The RV pacing lead was then   advanced to the heart via fluoroscopic guidance. The ventricle was mapped,   and the lead was fixed to the right ventricular  apical septum. . After   lead placement, appropriate sensing and thresholds were obtained. The   sheath was peeled away.  A second 7F sheath was advanced over another guidewire, and the dilator   and guidewire were removed. A RA lead was advanced to the RAA.    Appropriate sensing and thresholds were obtained.   The leads were sutured in place to the pectoralis muscle using x3.0  non   absorbable  ties. Lead measurements were obtained.    RA Medtronic 5076-45 [id number] 125V @ 0.4 msec; 532 ohms, 2.0 mV  RV medtronic 5076-52 [id number] 0.5V @ 0.4 msec; 1064; 6.8 mV    No diaphragmatic pacing at 8 V  and .4 msec in either the RA or RV.     A dual chamber pacemaker pulse generator  Medtronic Selina XT DR MRI    [id number] was attached to the leads and implanted.  The device was interrogated and its parameters recorded; telemetered   electrograms and pacing and sensing thresholds were measured.   The pocket was flushed with Vancomycin and saline solution. Wound   hemostasis was obtained with electrocautery.. The wound was closed in   three layers using #0 and #3-0 Vicryl. The skin was approximated with   subcuticular suture and skin adhesive. The incision covered with a sterile   dressing. Manual pressure was applied.    The temporary transvenous pacemaker was removed under fluoroscopic   guidance. Hemostasis was obtained with pressure.     Final Implant Settings:  : Medtronic  Device: Dual chamber pacemaker     Mode: DDD Lower rate 60bpm   Upper track rate 120bpm      Post implant device parameters scanned into the system    Summary:  Successful implantation of a Medtronic left sided Dual chamber pacemaker    See complete procedural log and parameters.   Structural heart procedure     [facility], Cath Lab, [address] [place] [address]    Cardiovascular Catheterization Report    Patient Name:      JAIRO COOK LEONORA     Performing Physician:  Salazar Correa MD  Study Date:        2/23/2024            Verifying Physician:   Salazar Correa MD  MRN/PID:           [id number]             Cardiologist/Co-scrub:  Accession#:        [id number]         Ordering Physician:    Salazar CORREA  Date of Birth/Age: 1935 / 89 years Fellow:                [id number] Kristin Chambers MD  Gender:            F                     Fellow:  Admit Date:                             Surgeon:               Cody Khanna MD  Encounter#:        [id number]       Study: DORIE - Transcatheter Aortic Valve Implantation       Indications:  Severe aortic stenosis.     Transcatheter Aortic Valve Replacement (TAVR):  The right femoral artery was accessed using the transfemoral method. A 6 Fr sheath was inserted followed by the deployment of 2 Proglides. 14 F Sentrant. The left femoral artery was accessed percutaneously and a 6 Slovenian contralateral sheath was placed. A 7 Slovenian temporary pacemaker was inserted through the right jugular vein and advanced to the right ventricular apex. Adequate pacing thresholds were obtained. Evolut FX 26 mm valve was successfully deployed under rapid ventricular pacing at 160 BPM. Transthoracic echo performed post valve deployment revealed no mitral insufficiency and no central aortic insufficiency and trace/trivial paravalvular aortic insufficiency. No device related events. No bleeding events occurred during the procedure. No vascular access complications were revealed. Access site was closed using 2 ProGlide devices. Hemostasis was achieved in the left femoral artery using a ProGlide device. Temporary pacing wire was sutured in place.       Hemo Personnel:  +-----------------------+---------+  Name                   Duty       +-----------------------+---------+  Braydon Correa MD 1  +-----------------------+---------+       Hemodynamic Pressures:     +----+-------------------+---------+------------+-------------+------+---------+  Site     Date Time       Phase    Systolic    Diastolic    ED  Mean mmHg                           Name       mmHg        mmHg      mmHg            +----+-------------------+---------+------------+-------------+------+---------+    MEIR  2/23/2024 1:02:54     Rest          153           54             89                       PM                                                   +----+-------------------+---------+------------+-------------+------+---------+   RFA  2/23/2024 1:10:33     Rest         181           61            103                       PM                                                   +----+-------------------+---------+------------+-------------+------+---------+    LV  2/23/2024 1:21:14     Rest         184            4    14                                PM                                                   +----+-------------------+---------+------------+-------------+------+---------+   RFA  2/23/2024 1:21:14     Rest         170           54             87                       PM                                                   +----+-------------------+---------+------------+-------------+------+---------+    AO  2/23/2024 1:34:33     Rest         128           44             69                       PM                                                   +----+-------------------+---------+------------+-------------+------+---------+    LV  2/23/2024 1:34:33     Rest         130            1    16                                PM                                                   +----+-------------------+---------+------------+-------------+------+---------+    AO  2/23/2024 1:34:40     Rest         136           47             76                       PM                                                   +----+-------------------+---------+------------+-------------+------+---------+    LV  2/23/2024 1:34:40     Rest         136            2    19                                PM                                                   +----+-------------------+---------+------------+-------------+------+---------+    AO  2/23/2024 1:34:46      Rest         138           47             75                       PM                                                   +----+-------------------+---------+------------+-------------+------+---------+   LVp  2/23/2024 1:34:46     Rest         106           20                                      PM                                                   +----+-------------------+---------+------------+-------------+------+---------+    AO  2/23/2024 1:34:53     Rest           0            0             42                       PM                                                   +----+-------------------+---------+------------+-------------+------+---------+   AOp  2/23/2024 1:34:53     Rest         138           41             75                       PM                                                   +----+-------------------+---------+------------+-------------+------+---------+       Complications:  No in-lab complications observed. No vascular access complications were revealed. No bleeding events occurred during the procedure. No device related events.     Cardiac Cath Post Procedure Notes:  Post Procedure Diagnosis: Successful TAVR with a 26 mm Evolut FX.  Blood Loss:               Estimated blood loss during the procedure was 20 mls.  Specimens Removed:        Number of specimen(s) removed: none.    ____________________________________________________________________________________  CONCLUSIONS:   1. Transcatheter aortic valve replacement with successful implantation of Evolut FX 26 mm valve.   2. Patient was enrolled in a research study and data was included in the TVT Registry.    ICD 10 Codes:  Nonrheumatic aortic (valve) stenosis-I35.0     CPT Codes:  DORIE Perc,femoral-00106.62     [id number] Braydon Correa MD  Performing Physician  Electronically signed by [id number] Braydon Correa MD on 2/26/2024 at 10:32:00  AM         ** Final  **        Assessment/Plan     Josee was seen today for follow-up.  Diagnoses and all orders for this visit:  Anemia, unspecified type (Primary)  -     CBC; Future  Primary hypertension  Hyperlipidemia, unspecified hyperlipidemia type  S/P TAVR (transcatheter aortic valve replacement)          In summary Ms. Henson is a pleasant 89-year-old white female with a past medical history significant for aortic stenosis s/p TAVR with subsequent CHB s/p pacemaker, hypertension, hyperlipidemia, chronic kidney disease, glucose intolerance, and obesity. She is overall feeling well. Her blood pressure initially was elevated. Repeat blood pressure was acceptable. I ordered blood work as above for surveillance of anemia. I have recommended she monitor blood pressure at home a few days a week and call if blood pressures are consistently greater than 140/80. She will continue current cardiovascular medications. She will follow up in January with Dr. Traylor.                   Orders:  No orders of the defined types were placed in this encounter.     Followup Appts:  Future Appointments   Date Time Provider Department Center   7/12/2024 11:00 AM CARDIAC REHAB [facility] [id number] [facility] ROOM [id number] [facility]   7/15/2024 11:00 AM CARDIAC REHAB [address] ROOM [address]   7/17/2024 11:00 AM CARDIAC REHAB [address] ROOM [id number] East   7/19/2024 11:00 AM CARDIAC REHAB [address] ROOM [id number] East   1/9/2025 10:30 AM [facility]   1/21/2025 11:40 AM Pérez Traylor MD [id number] [facility]   2/21/2025  8:00 AM  J[facility] [id number] CARD1 [id number] Academic           ____________________________________________________________  Elisa Hahn, APRN-CNP  [facility]  [facility]

## 2024-07-11 NOTE — PATIENT INSTRUCTIONS
Continue current cardiovascular medications  Check blood work today CBC  Follow up in January with Dr. Traylor  Monitor blood pressure at home a few days a week. If blood pressures are consistently greater than 140/80 call our office

## 2024-07-12 ENCOUNTER — CLINICAL SUPPORT (OUTPATIENT)
Dept: CARDIAC REHAB | Facility: CLINIC | Age: 89
End: 2024-07-12
Payer: MEDICARE

## 2024-07-12 DIAGNOSIS — Z95.2 S/P TAVR (TRANSCATHETER AORTIC VALVE REPLACEMENT): ICD-10-CM

## 2024-07-12 PROCEDURE — 93798 PHYS/QHP OP CAR RHAB W/ECG: CPT | Performed by: INTERNAL MEDICINE

## 2024-07-15 ENCOUNTER — CLINICAL SUPPORT (OUTPATIENT)
Dept: CARDIAC REHAB | Facility: CLINIC | Age: 89
End: 2024-07-15
Payer: MEDICARE

## 2024-07-15 DIAGNOSIS — Z95.2 S/P TAVR (TRANSCATHETER AORTIC VALVE REPLACEMENT): ICD-10-CM

## 2024-07-15 PROCEDURE — 93798 PHYS/QHP OP CAR RHAB W/ECG: CPT | Performed by: INTERNAL MEDICINE

## 2024-07-17 ENCOUNTER — CLINICAL SUPPORT (OUTPATIENT)
Dept: CARDIAC REHAB | Facility: CLINIC | Age: 89
End: 2024-07-17
Payer: MEDICARE

## 2024-07-17 DIAGNOSIS — Z95.2 S/P TAVR (TRANSCATHETER AORTIC VALVE REPLACEMENT): ICD-10-CM

## 2024-07-17 PROCEDURE — 93798 PHYS/QHP OP CAR RHAB W/ECG: CPT | Performed by: INTERNAL MEDICINE

## 2024-07-18 DIAGNOSIS — M54.16 LUMBAR RADICULOPATHY: Primary | ICD-10-CM

## 2024-07-18 RX ORDER — METHYLPREDNISOLONE 4 MG/1
TABLET ORAL
Qty: 21 TABLET | Refills: 0 | Status: SHIPPED | OUTPATIENT
Start: 2024-07-18 | End: 2024-07-25

## 2024-07-18 RX ORDER — CYCLOBENZAPRINE HCL 5 MG
5 TABLET ORAL 3 TIMES DAILY PRN
Qty: 30 TABLET | Refills: 2 | Status: SHIPPED | OUTPATIENT
Start: 2024-07-18 | End: 2025-03-15

## 2024-07-18 NOTE — PROGRESS NOTES
Patient called with some low back pain.  Patient describes pain in her lower back which is worse with bending or twisting that occurred starting yesterday and then got a little bit worse today.  She describes the pain is quite severe when she bends or twists.  She has no radicular symptoms today.  She did overdo it over the last couple days doing a lot of chores around the house and required a lot of bending and twisting.    Most likely musculoskeletal back pain will give her a Medrol Dosepak and low-dose Flexeril 5 mg.  She is to take the Flexeril at bedtime.  She will call if symptoms do not improve.

## 2024-07-19 ENCOUNTER — CLINICAL SUPPORT (OUTPATIENT)
Dept: CARDIAC REHAB | Facility: CLINIC | Age: 89
End: 2024-07-19
Payer: MEDICARE

## 2024-07-19 DIAGNOSIS — Z95.2 S/P TAVR (TRANSCATHETER AORTIC VALVE REPLACEMENT): ICD-10-CM

## 2024-07-19 PROCEDURE — 93798 PHYS/QHP OP CAR RHAB W/ECG: CPT | Performed by: INTERNAL MEDICINE

## 2024-07-22 ENCOUNTER — APPOINTMENT (OUTPATIENT)
Dept: CARDIAC REHAB | Facility: CLINIC | Age: 89
End: 2024-07-22
Payer: MEDICARE

## 2024-07-22 DIAGNOSIS — Z95.2 S/P TAVR (TRANSCATHETER AORTIC VALVE REPLACEMENT): ICD-10-CM

## 2024-07-22 PROCEDURE — 93798 PHYS/QHP OP CAR RHAB W/ECG: CPT | Performed by: INTERNAL MEDICINE

## 2024-07-26 ENCOUNTER — DOCUMENTATION (OUTPATIENT)
Dept: CARDIAC REHAB | Facility: CLINIC | Age: 89
End: 2024-07-26
Payer: MEDICARE

## 2024-07-26 NOTE — PROGRESS NOTES
Cardiac Rehabilitation Discharge Summary    Name: Josee Henson  Medical Record Number: [id number]  YOB: 1935  Age: 89 y.o.    Today’s Date: 7/26/2024  Primary Care Physician: Bashir Smith MD  Referring Physician: Pérez Traylor MD  Program Location: [address]  Primary Diagnosis:   1 S/P TAVR (transcatheter aortic valve replacement)  Referral to Cardiac Rehab     Onset/Date of Diagnosis: 2/23/2024    Session #36    AACVPR Risk Stratification: High      Falls Risk: Patient has remained free from falls while enrolled in rehab as of 5/17/24.   Psychosocial Assessment     Pre PHQ-9: 3  Post PHQ-9: 2    Sent PH-Q 9 to MD if score > 20: No; score < 20    Pt reported/currently experiencing stress: No  Patient uses stress management skills: No   History of: no history of anxiety or depression  Currently seeing a mental health provider: No  Social Support: Yes, Whom:family  Quality of Life Survey: SF-36   SF-36 Pre Post   Physical Component Score TBD TBD   Mental Component Score TBD TBD     Learning Assessment:  Learning assessment/barriers: None  Preferred learning method: Visual  Barriers: None  Comments:    Stages of Change:Maintenance    Psychosocial Plan    Goal Status: Met    Psychosocial Goals: Demonstrating proper techniques for stress management and Maintain or lower PH-Q 9 score by discharge    Psychosocial Interventions/Education:   4/29/24 Initial PHQ-9 score reviewed. CV  5/17/24 Report no new stress/cv  6/14/24 No change in psychosocial/cv  7/8/24 No issue.  Feels good about her health. CV    Psychosocial Discharge PHQ-9 score decreased by 1 points    Nutrition Assessment:    Hyperlipidemia: Yes     Lipids:   Lab Results   Component Value Date    CHOL 179 03/25/2024    HDL 82.6 03/25/2024    LDLF 81 10/27/2022    TRIG 146 03/25/2024       Current Dietary Guidelines: Low fat, Low sodium  Barriers to dietary change: no    Diet Habit Survey: Picture Your Plate  Pre:   53  Post: 61     Diabetes Assessment    Lab Results   Component Value Date    HGBA1C 6.0 (H) 10/26/2023       History of Diabetes: No    Weight Management  Height: 61 inches  Weight: 155 lbs  BMI: 29.8     Nutrition Plan    Goal Status: Met    Nutrition Goals: Improve Diet Habit Survey score by 5-10 points by discharge and Adapt a low-sodium, DASH diet prior to discharge    Nutrition Interventions/Education:   5/1/24 Gave and reviewed Heart Healthy Tips handout. CLINT  6/5/2024  Gave and reviewed handout on Label Reading Tips. Encouraged pt to read labels regularly for sat fat, sodium, fiber, and added sugars. Encouraged to review handout further and follow up with questions as needed.  Signature Nisa Riggs RDN, LD  7/3/24 Cardiac booklet given and lipids reviewed. CV    Nutrition Discharge PYP score increased by 8 points    Exercise Assessment    Home Exercise:  Pt to return to her   Mode: NA  Frequency: NA  Duration: NA    Exercise Prescription     Exercise Prescription based on: Duke Activity Status Index (DASI)    DASI Score: 10.7    MET Score: 4.1    Frequency:  3 days/week   Mode: NuStep and Recumbent Cycle   Duration: 40 total aerobic minutes   Intensity: RPE 12-16  Target HR:     MET Level: 2.9  Patient wears supplemental O2: No     Modality Workload METs Duration (minutes)   1 Pre-Exercise   2:00   2 NuStep Load 3@ 40 Begum 2.3 20:00   3 Recumbent Bike Load 4 @ 55 rpms 2.9 20:00   4 Weights    10:00   5 Post-Exercise   2:00     Resistance Training: Yes   Home Exercise Prescription given:  yes    Exercise Plan    Goal Status: Met    Exercise Goals: Increase exercise MET level by 5-10% each week, Increase total exercise duration to 30-45 minutes, and Obtain 150 minutes/week of moderate intensity aerobic exercise    Exercise Interventions/Education:   5/15/24 adjusted THR/MS  5/17/24 gave pt home ex rx & log, encouraged pt to exercise on non-rehab days/MS  6/7/24 Weight training with  the EP intern.  7/8/24 strength training with EP intern.    Exercise Reassessment: Increased exercise duration by 67% and intensity/METS by 61%    Other Core Components/Risk Factor Assessment:    Medication adherence  Current Medications:   Medication Documentation Review Audit       Reviewed by LEONCIO Caraballo (Nurse Practitioner) on 07/11/24 at 1148      Medication Order Taking? Sig Documenting Provider Last Dose Status   acetaminophen (Tylenol Extra Strength) 500 mg tablet [id number] No Take 2 tablets (1,000 mg) by mouth every 8 hours if needed for mild pain (1 - 3), headaches or fever (temp greater than 38.0 C). As directed. Manjinder Weber MD Taking Active   amoxicillin (Amoxil) 500 mg capsule [id number] No TAKE 4 CAPSULES BY MOUTH 1 HOUR BEFORE PROCEDURE Historical Provider, MD Taking Active   aspirin (Ecotrin Low Strength) 81 mg EC tablet [id number] No Take 1 tablet (81 mg) by mouth once daily at bedtime. Historical MD Tia Taking Active   atorvastatin (Lipitor) 40 mg tablet [id number] No Take 1 tablet (40 mg) by mouth once daily. Bashir Smith MD Taking Active   biotin 5 mg capsule [id number] No Take 1 capsule (5 mg) by mouth once daily. Historical MD Tia Taking Active   calcium carbonate-vitamin D3 (Oscal-500) 500 mg-10 mcg (400 unit) tablet [id number] No Take 2 tablets by mouth once daily at bedtime. Historical MD Tia Taking Active   denosumab (Prolia) 60 mg/mL syringe [id number] No Inject 1 mL (60 mg) under the skin every 6 months. Historical MD Tia Taking Active   fexofenadine (Allegra Allergy) 180 mg tablet [id number] No Take 1 tablet (180 mg) by mouth once daily. Historical MD Tia Taking Active   flaxseed oiL 1,000 mg capsule [id number] No Take 1 capsule (1,000 mg) by mouth 2 times a day. Manjinder Weber MD Taking Active   fluticasone (Flonase) 50 mcg/actuation nasal spray [id number] No Administer 2 sprays into each nostril once daily. Manjinder Weber MD  Taking Active   levothyroxine (Synthroid, Levoxyl) 137 mcg tablet [id number]  Take 1 tablet (137 mcg) by mouth once daily. Leonila Bocanegra MD  Active   multivitamin with minerals tablet [id number] No Take 1 tablet by mouth once daily. Historical Provider, MD Taking Active   omeprazole (PriLOSEC) 20 mg DR capsule [id number] No Take 1 capsule (20 mg) by mouth once daily. Historical Provider, MD Taking Active   quiNINE (Qualaquin) 324 mg capsule [id number] No Take 1 capsule (324 mg) by mouth once daily at bedtime. For leg cramps Historical Provider, MD Taking Active   valsartan (Diovan) 40 mg tablet [id number] No Take 1 tablet (40 mg) by mouth 2 times a day. ELEAZAR Caraballo-CNP Taking Active   zolpidem (Ambien) 5 mg tablet [id number] No Take 1 tablet (5 mg) by mouth as needed at bedtime for sleep. Bashir Smith MD Taking Active                                 Medication compliance: Yes   Uses pill box/organizer: Yes    Carries medication list: Yes     Blood Pressure Management  History of Hypertension: Yes   Medication Changes: No   Resting BP:  122/68       Heart Failure Management  Hx of Heart Failure: No    Smoking/Tobacco Assessment  Social History     Tobacco Use   Smoking Status Never   Smokeless Tobacco Never       Other Core Component Plan    Goal Status: Met    Other Core Component Goals: Verbalize medication usage and drug actions by discharge, Achieve resting BP of < 130/80 by discharge    Other Core Component Interventions/Education:   4/29/24 BP readings taken pre and post exercise at each session to ensure proper control.  5/17/24 Reviewed effects of exercise on BP.  6/14/24 Pt compliant with meds.  7/8/24 BP sometimes above target goal.  Pt asked about medication compliance.  Pt states that she is compliant with medications.        Individual Patient Goals:    Increase strength and endurance to help with stability by discharge  Return to her  personal training to help with strength by discharge    Goal  Status: In progress    Staff Comments:  Ms. Henson has completed her Cardiac Rehab at 36 visits.  She did the best that she could due to her orthopedic issues.  She increased her exercise duration by 67% and exercise intensity/METS by 61%.  She has had a few strength training sessions with the EP intern.  She plans on continuing her strength training with her  once she is done with Cardiac Rehab.  She was advised individually about her diet and cardiovascular health.  She was given educational materials on her cardiovascular health and diet.  She voiced no cardiac complaints.  No ectopy was observed on the monitor while in the program.  Thank you for your referral.      Rehab Staff Signature: Ankur Alicia RN

## 2024-07-29 ENCOUNTER — APPOINTMENT (OUTPATIENT)
Dept: RADIOLOGY | Facility: HOSPITAL | Age: 89
End: 2024-07-29
Payer: MEDICARE

## 2024-07-29 ENCOUNTER — OFFICE VISIT (OUTPATIENT)
Dept: PRIMARY CARE | Facility: CLINIC | Age: 89
End: 2024-07-29
Payer: MEDICARE

## 2024-07-29 ENCOUNTER — HOSPITAL ENCOUNTER (EMERGENCY)
Facility: HOSPITAL | Age: 89
Discharge: HOME | End: 2024-07-29
Attending: EMERGENCY MEDICINE
Payer: MEDICARE

## 2024-07-29 ENCOUNTER — HOSPITAL ENCOUNTER (OUTPATIENT)
Dept: RADIOLOGY | Facility: HOSPITAL | Age: 89
Discharge: HOME | End: 2024-07-29
Payer: MEDICARE

## 2024-07-29 ENCOUNTER — APPOINTMENT (OUTPATIENT)
Dept: CARDIOLOGY | Facility: HOSPITAL | Age: 89
End: 2024-07-29
Payer: MEDICARE

## 2024-07-29 ENCOUNTER — APPOINTMENT (OUTPATIENT)
Dept: PRIMARY CARE | Facility: CLINIC | Age: 89
End: 2024-07-29
Payer: MEDICARE

## 2024-07-29 VITALS
OXYGEN SATURATION: 100 % | TEMPERATURE: 97.8 F | SYSTOLIC BLOOD PRESSURE: 188 MMHG | HEIGHT: 61 IN | BODY MASS INDEX: 29.64 KG/M2 | WEIGHT: 157 LBS | DIASTOLIC BLOOD PRESSURE: 76 MMHG | RESPIRATION RATE: 15 BRPM | HEART RATE: 64 BPM

## 2024-07-29 DIAGNOSIS — N30.00 ACUTE CYSTITIS WITHOUT HEMATURIA: ICD-10-CM

## 2024-07-29 DIAGNOSIS — S69.91XA HAND TRAUMA, RIGHT, INITIAL ENCOUNTER: Primary | ICD-10-CM

## 2024-07-29 DIAGNOSIS — S69.91XA HAND TRAUMA, RIGHT, INITIAL ENCOUNTER: ICD-10-CM

## 2024-07-29 DIAGNOSIS — S63.259A FINGER DISLOCATION, INITIAL ENCOUNTER: Primary | ICD-10-CM

## 2024-07-29 LAB
ALBUMIN SERPL BCP-MCNC: 3.8 G/DL (ref 3.4–5)
ALP SERPL-CCNC: 93 U/L (ref 33–136)
ALT SERPL W P-5'-P-CCNC: 24 U/L (ref 7–45)
ANION GAP SERPL CALC-SCNC: 10 MMOL/L (ref 10–20)
APPEARANCE UR: CLEAR
AST SERPL W P-5'-P-CCNC: 60 U/L (ref 9–39)
BASOPHILS # BLD AUTO: 0.05 X10*3/UL (ref 0–0.1)
BASOPHILS NFR BLD AUTO: 0.6 %
BILIRUB SERPL-MCNC: 0.5 MG/DL (ref 0–1.2)
BILIRUB UR STRIP.AUTO-MCNC: NEGATIVE MG/DL
BUN SERPL-MCNC: 31 MG/DL (ref 6–23)
CALCIUM SERPL-MCNC: 9.4 MG/DL (ref 8.6–10.3)
CHLORIDE SERPL-SCNC: 104 MMOL/L (ref 98–107)
CO2 SERPL-SCNC: 27 MMOL/L (ref 21–32)
COLOR UR: ABNORMAL
CREAT SERPL-MCNC: 1.69 MG/DL (ref 0.5–1.05)
EGFRCR SERPLBLD CKD-EPI 2021: 29 ML/MIN/1.73M*2
EOSINOPHIL # BLD AUTO: 0.18 X10*3/UL (ref 0–0.4)
EOSINOPHIL NFR BLD AUTO: 2.1 %
ERYTHROCYTE [DISTWIDTH] IN BLOOD BY AUTOMATED COUNT: 15.8 % (ref 11.5–14.5)
GLUCOSE SERPL-MCNC: 93 MG/DL (ref 74–99)
GLUCOSE UR STRIP.AUTO-MCNC: NORMAL MG/DL
HCT VFR BLD AUTO: 37.5 % (ref 36–46)
HGB BLD-MCNC: 11.8 G/DL (ref 12–16)
IMM GRANULOCYTES # BLD AUTO: 0.06 X10*3/UL (ref 0–0.5)
IMM GRANULOCYTES NFR BLD AUTO: 0.7 % (ref 0–0.9)
KETONES UR STRIP.AUTO-MCNC: NEGATIVE MG/DL
LEUKOCYTE ESTERASE UR QL STRIP.AUTO: ABNORMAL
LYMPHOCYTES # BLD AUTO: 1.37 X10*3/UL (ref 0.8–3)
LYMPHOCYTES NFR BLD AUTO: 16.3 %
MCH RBC QN AUTO: 27.5 PG (ref 26–34)
MCHC RBC AUTO-ENTMCNC: 31.5 G/DL (ref 32–36)
MCV RBC AUTO: 87 FL (ref 80–100)
MONOCYTES # BLD AUTO: 0.51 X10*3/UL (ref 0.05–0.8)
MONOCYTES NFR BLD AUTO: 6.1 %
NEUTROPHILS # BLD AUTO: 6.23 X10*3/UL (ref 1.6–5.5)
NEUTROPHILS NFR BLD AUTO: 74.2 %
NITRITE UR QL STRIP.AUTO: NEGATIVE
NRBC BLD-RTO: 0 /100 WBCS (ref 0–0)
PH UR STRIP.AUTO: 7 [PH]
PLATELET # BLD AUTO: 176 X10*3/UL (ref 150–450)
POTASSIUM SERPL-SCNC: 5.3 MMOL/L (ref 3.5–5.3)
PROT SERPL-MCNC: 6.5 G/DL (ref 6.4–8.2)
PROT UR STRIP.AUTO-MCNC: NEGATIVE MG/DL
RBC # BLD AUTO: 4.29 X10*6/UL (ref 4–5.2)
RBC # UR STRIP.AUTO: NEGATIVE /UL
RBC #/AREA URNS AUTO: ABNORMAL /HPF
SODIUM SERPL-SCNC: 136 MMOL/L (ref 136–145)
SP GR UR STRIP.AUTO: 1.01
UROBILINOGEN UR STRIP.AUTO-MCNC: NORMAL MG/DL
WBC # BLD AUTO: 8.4 X10*3/UL (ref 4.4–11.3)
WBC #/AREA URNS AUTO: ABNORMAL /HPF

## 2024-07-29 PROCEDURE — 73130 X-RAY EXAM OF HAND: CPT | Mod: RIGHT SIDE | Performed by: RADIOLOGY

## 2024-07-29 PROCEDURE — 2500000005 HC RX 250 GENERAL PHARMACY W/O HCPCS: Performed by: PHYSICIAN ASSISTANT

## 2024-07-29 PROCEDURE — 85025 COMPLETE CBC W/AUTO DIFF WBC: CPT | Performed by: EMERGENCY MEDICINE

## 2024-07-29 PROCEDURE — 99213 OFFICE O/P EST LOW 20 MIN: CPT | Performed by: INTERNAL MEDICINE

## 2024-07-29 PROCEDURE — 26770 TREAT FINGER DISLOCATION: CPT | Mod: F7 | Performed by: EMERGENCY MEDICINE

## 2024-07-29 PROCEDURE — 80053 COMPREHEN METABOLIC PANEL: CPT | Performed by: PHYSICIAN ASSISTANT

## 2024-07-29 PROCEDURE — 73140 X-RAY EXAM OF FINGER(S): CPT | Mod: RIGHT SIDE | Performed by: RADIOLOGY

## 2024-07-29 PROCEDURE — 85025 COMPLETE CBC W/AUTO DIFF WBC: CPT | Performed by: GENERAL PRACTICE

## 2024-07-29 PROCEDURE — 81001 URINALYSIS AUTO W/SCOPE: CPT | Performed by: PHYSICIAN ASSISTANT

## 2024-07-29 PROCEDURE — 36415 COLL VENOUS BLD VENIPUNCTURE: CPT | Performed by: GENERAL PRACTICE

## 2024-07-29 PROCEDURE — 99283 EMERGENCY DEPT VISIT LOW MDM: CPT | Mod: 25

## 2024-07-29 PROCEDURE — 73140 X-RAY EXAM OF FINGER(S): CPT | Mod: RT

## 2024-07-29 PROCEDURE — 73130 X-RAY EXAM OF HAND: CPT | Mod: RT

## 2024-07-29 PROCEDURE — 87086 URINE CULTURE/COLONY COUNT: CPT | Mod: AHULAB | Performed by: PHYSICIAN ASSISTANT

## 2024-07-29 PROCEDURE — 36415 COLL VENOUS BLD VENIPUNCTURE: CPT | Performed by: PHYSICIAN ASSISTANT

## 2024-07-29 PROCEDURE — 93005 ELECTROCARDIOGRAM TRACING: CPT

## 2024-07-29 RX ORDER — CEPHALEXIN 250 MG/1
250 CAPSULE ORAL 2 TIMES DAILY
Qty: 10 CAPSULE | Refills: 0 | Status: SHIPPED | OUTPATIENT
Start: 2024-07-29 | End: 2024-08-03

## 2024-07-29 RX ORDER — LIDOCAINE HYDROCHLORIDE 10 MG/ML
10 INJECTION INFILTRATION; PERINEURAL ONCE
Status: COMPLETED | OUTPATIENT
Start: 2024-07-29 | End: 2024-07-29

## 2024-07-29 ASSESSMENT — COLUMBIA-SUICIDE SEVERITY RATING SCALE - C-SSRS
1. IN THE PAST MONTH, HAVE YOU WISHED YOU WERE DEAD OR WISHED YOU COULD GO TO SLEEP AND NOT WAKE UP?: NO
2. HAVE YOU ACTUALLY HAD ANY THOUGHTS OF KILLING YOURSELF?: NO
6. HAVE YOU EVER DONE ANYTHING, STARTED TO DO ANYTHING, OR PREPARED TO DO ANYTHING TO END YOUR LIFE?: NO

## 2024-07-29 ASSESSMENT — PAIN SCALES - GENERAL: PAINLEVEL_OUTOF10: 9

## 2024-07-29 ASSESSMENT — PAIN - FUNCTIONAL ASSESSMENT: PAIN_FUNCTIONAL_ASSESSMENT: 0-10

## 2024-07-29 ASSESSMENT — PAIN DESCRIPTION - ORIENTATION: ORIENTATION: RIGHT

## 2024-07-29 ASSESSMENT — PAIN DESCRIPTION - LOCATION: LOCATION: HAND

## 2024-07-29 NOTE — PROGRESS NOTES
Subjective   Patient ID: Josee Henson is a 89 y.o. female who presents for No chief complaint on file..    Patient is here for a recent injury.  She tripped and fell yesterday landed on her right buttocks and also twisted her finger and sustained a painful injury to her right middle finger.  She did put ice on it initially.    On exam she has a swollen ecchymotic PIP joint on the right middle finger.  X-rays of that joint show a dislocation without fracture.   Patient is referred to the orthopedic walk-in clinic for urgent reduction and splinting.         Review of Systems    Objective   There were no vitals taken for this visit.    Physical Exam    Assessment/Plan

## 2024-07-29 NOTE — ED TRIAGE NOTES
TRIAGE NOTE   I saw the patient as the Clinician in Triage and performed a brief history and physical exam, established acuity, and ordered appropriate tests to develop basic plan of care. Patient will be seen by an [facility], resident and/or physician who will independently evaluate the patient. Please see subsequent provider notes for further details and disposition.     Brief HPI: In brief, Josee Henson is a 89 y.o. female that presents for fall with hand injury.  Unwitnessed fall at home this morning.  Patient denies syncope.  Saw PCP for hand injury outpatient x-rays done.  Dislocation of right middle finger identified.  Sent to ER for reduction.  Family with patient is concerned about dizziness after starting Flexeril.  Patient has been patient has been ambulatory sometimes she uses her cane.  She has no other complaints of pain or injury.  Denies head injury or loss conscious.  Not on anticoagulants.    Focused Physical exam:   Awake alert in no distress stable vital signs.  Nontoxic.  Speech is clear and coherent.  Memory intact.  Normal neurological exam.  Normal mental status.  Swelling ecchymoses deformity PIP joint right middle finger.  Normal neurovascular exam.    Plan/MDM:   Workup initiated.  Family concerned of dizziness associated with the fall.  Needs right middle finger reduction.  Please see subsequent provider note for further details and disposition

## 2024-07-29 NOTE — ED TRIAGE NOTES
"C/O RIGHT HAND 3RD DIGIT DISLOCATION, PT SENT TO ED TO HAVE FINGER \"SET\" ECCHYMOSIS/EDEMA NOTED TO RIGHT HAND, WARM TO TOUCH, CAP REFILL BRISK, DENIES TAKING ANY OTC MEDS FOR PAIN RELIEF, ONSET YESTERDAY AFTER PT HAD MECHANICAL FALL, DENIES HEAD INJURY/LOC, PT TAKES A BABY ASA DAILY   "

## 2024-07-30 LAB
ATRIAL RATE: 66 BPM
HOLD SPECIMEN: NORMAL
P AXIS: -31 DEGREES
PR INTERVAL: 188 MS
Q ONSET: 198 MS
QRS COUNT: 10 BEATS
QRS DURATION: 130 MS
QT INTERVAL: 426 MS
QTC CALCULATION(BAZETT): 446 MS
QTC FREDERICIA: 440 MS
R AXIS: -32 DEGREES
T AXIS: -20 DEGREES
T OFFSET: 411 MS
VENTRICULAR RATE: 66 BPM

## 2024-07-30 NOTE — ED PROVIDER NOTES
HPI   Chief Complaint   Patient presents with    Hand Injury       89 y old female with a complaint of r 3rd finger dislocation after a fall yesterday. Saw pcp and had an xray today. Had flexeril and felt dizzy and tired on it so stopped taking it. Admits to some increased urinary frequency. Hand is swollen and tender and bruised.               Patient History   Past Medical History:   Diagnosis Date    Aortic stenosis     Hyperlipidemia     Hypertension      Past Surgical History:   Procedure Laterality Date    CARDIAC CATHETERIZATION N/A 2/2/2024    Procedure: Left And Right Heart Cath, With LV;  Surgeon: Pérez Traylor MD;  Location: [facility] Cardiac Cath Lab;  Service: Cardiovascular;  Laterality: N/A;  Hydration needed @ 8:30 AM    CARDIAC CATHETERIZATION N/A 2/23/2024    Procedure: TAVR (Transcatheter AV Replacement);  Surgeon: Braydon Correa MD;  Location: [place] [address] Cardiac Cath Lab;  Service: Cardiovascular;  Laterality: N/A;  Medtr[employer], Evolut FX 26    CARDIAC CATHETERIZATION N/A 2/23/2024    Procedure: TVP for TAVR;  Surgeon: Braydon Correa MD;  Location: [place] [address] Cardiac Cath Lab;  Service: Cardiovascular;  Laterality: N/A;    CARDIAC CATHETERIZATION N/A 2/23/2024    Procedure: TAVR (Transcatheter AV Replacement);  Surgeon: Brandi Serrano MD;  Location: [place] [address] Cardiac Cath Lab;  Service: Cardiac Surgery;  Laterality: N/A;    CARDIAC ELECTROPHYSIOLOGY PROCEDURE Left 2/26/2024    Procedure: PPM IMPLANT DUAL;  Surgeon: Marie Bishop MD;  Location: [place] [address] Cardiac Cath Lab;  Service: Electrophysiology;  Laterality: Left;  CI[employer] company will be determined by either Dr. Douglas or Dr. Bishop.    COLONOSCOPY  04/15/2014    Complete Colonoscopy    OTHER SURGICAL HISTORY  04/15/2014    Revision Of Total Knee Arthroplasty    TOTAL KNEE ARTHROPLASTY  04/15/2014    Total Knee Arthroplasty     Family History   Problem Relation Name Age of Onset    Alzheimer's disease Mother  83     Other (ovarian mass) Mother      Diabetes Father  69    Leukemia Father      Heart attack Father       Social History     Tobacco Use    Smoking status: Never    Smokeless tobacco: Never   Substance Use Topics    Alcohol use: Yes     Alcohol/week: 1.0 standard drink of alcohol     Types: 1 Glasses of wine per week    Drug use: Never       Physical Exam   ED Triage Vitals   Temperature Heart Rate Respirations BP   07/29/24 1635 07/29/24 1635 07/29/24 1635 07/29/24 1635   36.5 °C (97.7 °F) 80 15 (!) 162/93      Pulse Ox Temp Source Heart Rate Source Patient Position   07/29/24 1635 07/29/24 1928 07/29/24 1928 --   97 % Temporal Monitor       BP Location FiO2 (%)     07/29/24 1928 --     Right arm        Physical Exam  Vitals reviewed.   Constitutional:       General: She is not in acute distress.     Appearance: Normal appearance. She is normal weight. She is not ill-appearing, toxic-appearing or diaphoretic.   HENT:      Head: Normocephalic and atraumatic.      Right Ear: External ear normal.      Left Ear: External ear normal.      Nose: Nose normal.   Eyes:      Extraocular Movements: Extraocular movements intact.      Conjunctiva/sclera: Conjunctivae normal.      Pupils: Pupils are equal, round, and reactive to light.   Pulmonary:      Effort: Pulmonary effort is normal. No respiratory distress.      Breath sounds: No stridor.   Abdominal:      General: There is no distension.   Musculoskeletal:         General: Swelling, tenderness, deformity and signs of injury present.      Right hand: Tenderness and bony tenderness present. Decreased range of motion.        Hands:       Cervical back: Normal range of motion.      Comments: 3rd metacarpal dislocation   Skin:     General: Skin is warm.      Capillary Refill: Capillary refill takes less than 2 seconds.      Coloration: Skin is not jaundiced or pale.      Findings: No bruising or rash.   Neurological:      General: No focal deficit present.      Mental Status: She  is alert and oriented to person, place, and time. Mental status is at baseline.      Cranial Nerves: No cranial nerve deficit.      Motor: No weakness.   Psychiatric:         Mood and Affect: Mood normal.         Behavior: Behavior normal.         Thought Content: Thought content normal.         Judgment: Judgment normal.           ED Course & MDM   Diagnoses as of 07/29/24 2305   Finger dislocation, initial encounter   Acute cystitis without hematuria                       No data recorded                      Medical Decision Making  Ddx: dislocation, fracture, uti, electrolyte abnormality.     Reduced finger, will tx for uti with keflex 250mg    Amount and/or Complexity of Data Reviewed  Independent Historian: caregiver  External Data Reviewed: labs and radiology.     Details: Repeat xr shows fracture and reduction  Labs show chronic ckd, uti  Labs: ordered.  Radiology: ordered.    Risk  OTC drugs.  Prescription drug management.        Procedure  Orthopaedic Injury Treatment - Upper Extremity    Performed by: Carlos Francis PA-C  Authorized by: Hayder Anderson MD    Consent:     Consent obtained:  Verbal    Consent given by:  Patient    Risks discussed:  Fracture and irreducible dislocation    Alternatives discussed:  Referral  [place] protocol:     Procedure explained and questions answered to patient or proxy's satisfaction: yes      Imaging studies available: yes      Immediately prior to procedure, a time out was called: yes      Patient identity confirmed:  Verbally with patient  Location:     Location:  Finger    Finger location:  R long finger    Finger dislocation type: PIP    Pre-procedure details:     Pre-procedure imaging:  X-ray    Imaging findings: dislocation present      Distal perfusion: normal    Anesthesia:     Anesthesia method:  Nerve block    Block needle gauge:  27 G    Block anesthetic:  Lidocaine 1% w/o epi    Block injection procedure:  Anatomic landmarks identified and negative  aspiration for blood    Block outcome:  Anesthesia achieved  Procedure details:     Manipulation performed: yes      Finger reduction method:  Traction and countertraction    Reduction successful: yes      Reduction confirmed with imaging: yes      Immobilization:  Splint    Splint type:  Static finger  Post-procedure details:     Neurological function: normal      Distal perfusion: normal      Range of motion: improved      Procedure completion:  Tolerated well, no immediate complications       Carlos Francis PA-C  07/29/24 4850

## 2024-07-31 LAB — BACTERIA UR CULT: NORMAL

## 2024-08-06 ENCOUNTER — OFFICE VISIT (OUTPATIENT)
Dept: ORTHOPEDIC SURGERY | Facility: HOSPITAL | Age: 89
End: 2024-08-06
Payer: MEDICARE

## 2024-08-06 ENCOUNTER — HOSPITAL ENCOUNTER (OUTPATIENT)
Dept: RADIOLOGY | Facility: HOSPITAL | Age: 89
Discharge: HOME | End: 2024-08-06
Payer: MEDICARE

## 2024-08-06 DIAGNOSIS — M79.644 FINGER PAIN, RIGHT: Primary | ICD-10-CM

## 2024-08-06 DIAGNOSIS — M79.644 FINGER PAIN, RIGHT: ICD-10-CM

## 2024-08-06 PROCEDURE — 1159F MED LIST DOCD IN RCRD: CPT | Performed by: STUDENT IN AN ORGANIZED HEALTH CARE EDUCATION/TRAINING PROGRAM

## 2024-08-06 PROCEDURE — 73140 X-RAY EXAM OF FINGER(S): CPT | Mod: RT

## 2024-08-06 PROCEDURE — 99203 OFFICE O/P NEW LOW 30 MIN: CPT | Performed by: STUDENT IN AN ORGANIZED HEALTH CARE EDUCATION/TRAINING PROGRAM

## 2024-08-06 PROCEDURE — 73140 X-RAY EXAM OF FINGER(S): CPT | Mod: RIGHT SIDE | Performed by: RADIOLOGY

## 2024-08-06 PROCEDURE — 99213 OFFICE O/P EST LOW 20 MIN: CPT | Performed by: STUDENT IN AN ORGANIZED HEALTH CARE EDUCATION/TRAINING PROGRAM

## 2024-08-06 ASSESSMENT — PAIN DESCRIPTION - DESCRIPTORS: DESCRIPTORS: ACHING

## 2024-08-06 ASSESSMENT — PAIN - FUNCTIONAL ASSESSMENT: PAIN_FUNCTIONAL_ASSESSMENT: 0-10

## 2024-08-06 ASSESSMENT — PAIN SCALES - GENERAL: PAINLEVEL_OUTOF10: 0 - NO PAIN

## 2024-08-06 NOTE — PROGRESS NOTES
History of Present Illness   Patient presents today for evaluation of side: right upper extremity pain.    The patient sustained an acute injury on  7/28/2024 .   The patient sustained a fall onto her right hand.  She noticed right long finger pain.  She went to the emergency department on 7/29/2024 for a PIP joint dislocation.  He was closed reduced and splinted in an AlumaFoam splint in full extension.  She has removed it for hygiene purposes however she states she has kept her finger fully extended.  The patient denies any loss of consciousness or additional significant injuries.  The patient denies any current numbness or tingling.  The pain is sharp, acute in nature, better with rest worse with motion.     Past Medical History:   Diagnosis Date    Aortic stenosis     Hyperlipidemia     Hypertension        Medication Documentation Review Audit       Reviewed by Suzan Henderson MA (Medical Assistant) on 08/06/24 at 1406      Medication Order Taking? Sig Documenting Provider Last Dose Status   acetaminophen (Tylenol Extra Strength) 500 mg tablet [id number] No Take 2 tablets (1,000 mg) by mouth every 8 hours if needed for mild pain (1 - 3), headaches or fever (temp greater than 38.0 C). As directed. Historical Provider, MD Taking Active   amoxicillin (Amoxil) 500 mg capsule [id number] No TAKE 4 CAPSULES BY MOUTH 1 HOUR BEFORE PROCEDURE Historical Provider, MD Taking Active   aspirin (Ecotrin Low Strength) 81 mg EC tablet [id number] No Take 1 tablet (81 mg) by mouth once daily at bedtime. Historical Provider, MD Taking Active   atorvastatin (Lipitor) 40 mg tablet [id number] No Take 1 tablet (40 mg) by mouth once daily. Bashir Smith MD Taking Active   biotin 5 mg capsule [id number] No Take 1 capsule (5 mg) by mouth once daily. Historical Provider, MD Taking Active   calcium carbonate-vitamin D3 (Oscal-500) 500 mg-10 mcg (400 unit) tablet [id number] No Take 2 tablets by mouth once daily at bedtime. Historical  Provider, MD Taking Active   cephalexin (Keflex) 250 mg capsule [id number]  Take 1 capsule (250 mg) by mouth 2 times a day for 5 days. Carlos Francis PA-C   24 2359   cyclobenzaprine (Flexeril) 5 mg tablet [id number]  Take 1 tablet (5 mg) by mouth 3 times a day as needed for muscle spasms. Bashir Smith MD  Active   denosumab (Prolia) 60 mg/mL syringe [id number] No Inject 1 mL (60 mg) under the skin every 6 months. Historical Provider, MD Taking Active   fexofenadine (Allegra Allergy) 180 mg tablet [id number] No Take 1 tablet (180 mg) by mouth once daily. Historical Provider, MD Taking Active   flaxseed oiL 1,000 mg capsule [id number] No Take 1 capsule (1,000 mg) by mouth 2 times a day. Historical Provider, MD Taking Active   fluticasone (Flonase) 50 mcg/actuation nasal spray [id number] No Administer 2 sprays into each nostril once daily. Historical Provider, MD Taking Active   levothyroxine (Synthroid, Levoxyl) 137 mcg tablet [id number]  Take 1 tablet (137 mcg) by mouth once daily. Leonila Bocanegra MD  Active   multivitamin with minerals tablet [id number] No Take 1 tablet by mouth once daily. Historical Provider, MD Taking Active   omeprazole (PriLOSEC) 20 mg DR capsule [id number] No Take 1 capsule (20 mg) by mouth once daily. Historical Provider, MD Taking Active   quiNINE (Qualaquin) 324 mg capsule [id number] No Take 1 capsule (324 mg) by mouth once daily at bedtime. For leg cramps Historical Provider, MD Taking Active   valsartan (Diovan) 40 mg tablet [id number] No Take 1 tablet (40 mg) by mouth 2 times a day. Elisa Hahn, APRN-CNP Taking Active   zolpidem (Ambien) 5 mg tablet [id number] No Take 1 tablet (5 mg) by mouth as needed at bedtime for sleep. Bashir Smith MD Taking Active                    Allergies   Allergen Reactions    Tetracycline Rash and Unknown       Social History     Socioeconomic History    Marital status:      Spouse name: Not on file    Number of children: Not on file     Years of education: Not on file    Highest education level: Not on file   Occupational History    Not on file   Tobacco Use    Smoking status: Never    Smokeless tobacco: Never   Substance and Sexual Activity    Alcohol use: Yes     Alcohol/week: 1.0 standard drink of alcohol     Types: 1 Glasses of wine per week    Drug use: Never    Sexual activity: Not on file   Other Topics Concern    Not on file   Social History Narrative    Not on file     Social Determinants of Health     Financial Resource Strain: Low Risk  (2/23/2024)    Overall Financial Resource Strain (CARDIA)     Difficulty of Paying Living Expenses: Not very hard   Food Insecurity: Not on file   Transportation Needs: No Transportation Needs (2/23/2024)    PRAPARE - Transportation     Lack of Transportation (Medical): No     Lack of Transportation (Non-Medical): No   Physical Activity: Not on file   Stress: Not on file   Social Connections: Not on file   Intimate Partner Violence: Not on file   Housing Stability: Low Risk  (2/23/2024)    Housing Stability Vital Sign     Unable to Pay for Housing in the Last Year: No     Number of Places Lived in the Last Year: 1     Unstable Housing in the Last Year: No       Past Surgical History:   Procedure Laterality Date    CARDIAC CATHETERIZATION N/A 2/2/2024    Procedure: Left And Right Heart Cath, With LV;  Surgeon: Pérez Traylor MD;  Location: [facility] Cardiac Cath Lab;  Service: Cardiovascular;  Laterality: N/A;  Hydration needed @ 8:30 AM    CARDIAC CATHETERIZATION N/A 2/23/2024    Procedure: TAVR (Transcatheter AV Replacement);  Surgeon: Braydon Correa MD;  Location: [place] [address] Cardiac Cath Lab;  Service: Cardiovascular;  Laterality: N/A;  [employer]tr[employer], Evolut FX 26    CARDIAC CATHETERIZATION N/A 2/23/2024    Procedure: TVP for TAVR;  Surgeon: Braydon Correa MD;  Location: [place] [address] Cardiac Cath Lab;  Service: Cardiovascular;  Laterality: N/A;    CARDIAC CATHETERIZATION N/A 2/23/2024     Procedure: TAVR (Transcatheter AV Replacement);  Surgeon: Brandi Serrano MD;  Location: [place] [address] Cardiac Cath Lab;  Service: Cardiac Surgery;  Laterality: N/A;    CARDIAC ELECTROPHYSIOLOGY PROCEDURE Left 2/26/2024    Procedure: PPM IMPLANT DUAL;  Surgeon: Marie Bishop MD;  Location: [place] [address] Cardiac Cath Lab;  Service: Electrophysiology;  Laterality: Left;  [employer] company will be determined by either Dr. Douglas or Dr. Bishop.    COLONOSCOPY  04/15/2014    Complete Colonoscopy    OTHER SURGICAL HISTORY  04/15/2014    Revision Of Total Knee Arthroplasty    TOTAL KNEE ARTHROPLASTY  04/15/2014    Total Knee Arthroplasty          Review of Systems   GENERAL: Negative  GI: Negative  MUSCULOSKELETAL: See HPI  SKIN: Negative  NEURO:  Negative     Physical Exam:  side: right upper extremity: Long finger  Skin healthy to gross inspection, no breakdown  Mild swelling to the PIP joint of the long finger  Tender to palpation over the PIP joint of the long  No ecchymosis noted  Mild swan-neck deformity present  Patient able to flex and extend the DIP joint.  Patient able to flex and extend the PIP joint  Intact flexion and extension of 1st IP joint and finger abduction  Sensation intact to light touch medial / ulnar and radial nerve distribution   Good cap refill     Imaging  XR of the long finger taken reviewed today.  There is a PIP fracture subluxation present With swan-neck deformity     Assessment   Patient with an acute side: right long finger PIP fracture dislocation     Plan:  Patient sustained A significant injury to her PIP joint of her long finger.  She sustained a PIP fracture dislocation.  The volar lip of of the base of her middle phalanx has fractured which causes instability of her PIP joint.  She underwent a closed reduction and attempt of splinting in the emergency department.  She was splinted in extension which is an unstable position.  She has since subluxated dorsally.  We discussed an attempt  of a closed reduction and casting.  She was agreeable to this plan.  We discussed that her finger will likely never have full range of motion and will be limited.  We discussed if we underwent surgical options that she would also have limited motion and probably would not have a significantly better outcome versus nonoperative.  She understands this.  She was agreeable to close reduction and casting.  She tolerated the procedure well.    Follow-up 2 weeks

## 2024-08-09 DIAGNOSIS — I25.10 CAD (CORONARY ARTERY DISEASE): ICD-10-CM

## 2024-08-09 RX ORDER — ATORVASTATIN CALCIUM 40 MG/1
40 TABLET, FILM COATED ORAL DAILY
Qty: 90 TABLET | Refills: 0 | Status: SHIPPED | OUTPATIENT
Start: 2024-08-09

## 2024-08-12 DIAGNOSIS — M25.551 ACUTE RIGHT HIP PAIN: Primary | ICD-10-CM

## 2024-08-13 ENCOUNTER — HOSPITAL ENCOUNTER (OUTPATIENT)
Dept: RADIOLOGY | Facility: HOSPITAL | Age: 89
Discharge: HOME | End: 2024-08-13
Payer: MEDICARE

## 2024-08-13 DIAGNOSIS — M25.551 ACUTE RIGHT HIP PAIN: ICD-10-CM

## 2024-08-13 PROCEDURE — 73502 X-RAY EXAM HIP UNI 2-3 VIEWS: CPT | Mod: RT

## 2024-08-13 PROCEDURE — 73502 X-RAY EXAM HIP UNI 2-3 VIEWS: CPT | Mod: RIGHT SIDE | Performed by: RADIOLOGY

## 2024-08-20 ENCOUNTER — OFFICE VISIT (OUTPATIENT)
Dept: ORTHOPEDIC SURGERY | Facility: HOSPITAL | Age: 89
End: 2024-08-20
Payer: MEDICARE

## 2024-08-20 ENCOUNTER — HOSPITAL ENCOUNTER (OUTPATIENT)
Dept: RADIOLOGY | Facility: HOSPITAL | Age: 89
Discharge: HOME | End: 2024-08-20
Payer: MEDICARE

## 2024-08-20 DIAGNOSIS — M79.644 FINGER PAIN, RIGHT: Primary | ICD-10-CM

## 2024-08-20 DIAGNOSIS — M79.644 FINGER PAIN, RIGHT: ICD-10-CM

## 2024-08-20 PROCEDURE — 73140 X-RAY EXAM OF FINGER(S): CPT | Mod: RT

## 2024-08-20 PROCEDURE — L3809 WHFO W/O JOINTS PRE OTS: HCPCS | Performed by: STUDENT IN AN ORGANIZED HEALTH CARE EDUCATION/TRAINING PROGRAM

## 2024-08-20 PROCEDURE — 99213 OFFICE O/P EST LOW 20 MIN: CPT | Performed by: STUDENT IN AN ORGANIZED HEALTH CARE EDUCATION/TRAINING PROGRAM

## 2024-08-20 NOTE — PROGRESS NOTES
History of Present Illness   Patient presents today for evaluation of side: right upper extremity pain.    The patient sustained an acute injury on  7/28/2024 .   The patient sustained a fall onto her right hand.  She noticed right long finger pain.  She went to the emergency department on 7/29/2024 for a PIP joint dislocation.  He was closed reduced and splinted in an AlumaFoam splint in full extension.  She has removed it for hygiene purposes however she states she has kept her finger fully extended.  The patient denies any loss of consciousness or additional significant injuries.  The patient denies any current numbness or tingling.  The pain is sharp, acute in nature, better with rest worse with motion.    8/20/2024 follow-up:  Patient has been in a cast since her last visit.  She notes that she was not tolerating it well.  She notes it significantly limited her function and would prefer not to return to a cast.  She notes her pain has been minimal.  She denies numbness     Past Medical History:   Diagnosis Date    Aortic stenosis     Hyperlipidemia     Hypertension        Medication Documentation Review Audit       Reviewed by Suzan Henderson MA (Medical Assistant) on 08/06/24 at 1406      Medication Order Taking? Sig Documenting Provider Last Dose Status   acetaminophen (Tylenol Extra Strength) 500 mg tablet [id number] No Take 2 tablets (1,000 mg) by mouth every 8 hours if needed for mild pain (1 - 3), headaches or fever (temp greater than 38.0 C). As directed. Historical Provider, MD Taking Active   amoxicillin (Amoxil) 500 mg capsule [id number] No TAKE 4 CAPSULES BY MOUTH 1 HOUR BEFORE PROCEDURE Historical Provider, MD Taking Active   aspirin (Ecotrin Low Strength) 81 mg EC tablet [id number] No Take 1 tablet (81 mg) by mouth once daily at bedtime. Historical Provider, MD Taking Active   atorvastatin (Lipitor) 40 mg tablet [id number] No Take 1 tablet (40 mg) by mouth once daily. Bashir Smith MD Taking  Active   biotin 5 mg capsule [id number] No Take 1 capsule (5 mg) by mouth once daily. Historical MD Tia Taking Active   calcium carbonate-vitamin D3 (Oscal-500) 500 mg-10 mcg (400 unit) tablet [id number] No Take 2 tablets by mouth once daily at bedtime. Manjinder Weber MD Taking Active   cephalexin (Keflex) 250 mg capsule [id number]  Take 1 capsule (250 mg) by mouth 2 times a day for 5 days. Carlos Francis PA-C   24 2359   cyclobenzaprine (Flexeril) 5 mg tablet [id number]  Take 1 tablet (5 mg) by mouth 3 times a day as needed for muscle spasms. Bashir Smith MD  Active   denosumab (Prolia) 60 mg/mL syringe [id number] No Inject 1 mL (60 mg) under the skin every 6 months. Historical MD Tia Taking Active   fexofenadine (Allegra Allergy) 180 mg tablet [id number] No Take 1 tablet (180 mg) by mouth once daily. Manjinder Weber MD Taking Active   flaxseed oiL 1,000 mg capsule [id number] No Take 1 capsule (1,000 mg) by mouth 2 times a day. Historical MD Tia Taking Active   fluticasone (Flonase) 50 mcg/actuation nasal spray [id number] No Administer 2 sprays into each nostril once daily. Historical MD Tia Taking Active   levothyroxine (Synthroid, Levoxyl) 137 mcg tablet [id number]  Take 1 tablet (137 mcg) by mouth once daily. Leonila Bocanegra MD  Active   multivitamin with minerals tablet [id number] No Take 1 tablet by mouth once daily. Historical MD Tia Taking Active   omeprazole (PriLOSEC) 20 mg DR capsule [id number] No Take 1 capsule (20 mg) by mouth once daily. Historical MD Tia Taking Active   quiNINE (Qualaquin) 324 mg capsule [id number] No Take 1 capsule (324 mg) by mouth once daily at bedtime. For leg cramps Manjinder Weber MD Taking Active   valsartan (Diovan) 40 mg tablet [id number] No Take 1 tablet (40 mg) by mouth 2 times a day. Elisa Hahn, APRN-CNP Taking Active   zolpidem (Ambien) 5 mg tablet [id number] No Take 1 tablet (5 mg) by mouth as needed at bedtime  for sleep. Bashir Smith MD Taking Active                    Allergies   Allergen Reactions    Tetracycline Rash and Unknown       Social History     Socioeconomic History    Marital status:      Spouse name: Not on file    Number of children: Not on file    Years of education: Not on file    Highest education level: Not on file   Occupational History    Not on file   Tobacco Use    Smoking status: Never    Smokeless tobacco: Never   Substance and Sexual Activity    Alcohol use: Yes     Alcohol/week: 1.0 standard drink of alcohol     Types: 1 Glasses of wine per week    Drug use: Never    Sexual activity: Not on file   Other Topics Concern    Not on file   Social History Narrative    Not on file     Social Determinants of Health     Financial Resource Strain: Low Risk  (2/23/2024)    Overall Financial Resource Strain (CARDIA)     Difficulty of Paying Living Expenses: Not very hard   Food Insecurity: Not on file   Transportation Needs: No Transportation Needs (2/23/2024)    PRAPARE - Transportation     Lack of Transportation (Medical): No     Lack of Transportation (Non-Medical): No   Physical Activity: Not on file   Stress: Not on file   Social Connections: Not on file   Intimate Partner Violence: Not on file   Housing Stability: Low Risk  (2/23/2024)    Housing Stability Vital Sign     Unable to Pay for Housing in the Last Year: No     Number of Places Lived in the Last Year: 1     Unstable Housing in the Last Year: No       Past Surgical History:   Procedure Laterality Date    CARDIAC CATHETERIZATION N/A 2/2/2024    Procedure: Left And Right Heart Cath, With LV;  Surgeon: Pérez Traylor MD;  Location: [facility] Cardiac Cath Lab;  Service: Cardiovascular;  Laterality: N/A;  Hydration needed @ 8:30 AM    CARDIAC CATHETERIZATION N/A 2/23/2024    Procedure: TAVR (Transcatheter AV Replacement);  Surgeon: Braydon Correa MD;  Location: [place] [address] Cardiac Cath Lab;  Service: Cardiovascular;  Laterality: N/A;   [employer]tronic, Evolut FX 26    CARDIAC CATHETERIZATION N/A 2/23/2024    Procedure: TVP for TAVR;  Surgeon: Braydon Correa MD;  Location: [place] [address] Cardiac Cath Lab;  Service: Cardiovascular;  Laterality: N/A;    CARDIAC CATHETERIZATION N/A 2/23/2024    Procedure: TAVR (Transcatheter AV Replacement);  Surgeon: Brandi Serrano MD;  Location: [place] [address] Cardiac Cath Lab;  Service: Cardiac Surgery;  Laterality: N/A;    CARDIAC ELECTROPHYSIOLOGY PROCEDURE Left 2/26/2024    Procedure: PPM IMPLANT DUAL;  Surgeon: Marie Bishop MD;  Location: [place] [address] Cardiac Cath Lab;  Service: Electrophysiology;  Laterality: Left;  CI[employer] company will be determined by either Dr. Douglas or Dr. Bishop.    COLONOSCOPY  04/15/2014    Complete Colonoscopy    OTHER SURGICAL HISTORY  04/15/2014    Revision Of Total Knee Arthroplasty    TOTAL KNEE ARTHROPLASTY  04/15/2014    Total Knee Arthroplasty          Review of Systems   GENERAL: Negative  GI: Negative  MUSCULOSKELETAL: See HPI  SKIN: Negative  NEURO:  Negative     Physical Exam:  side: right upper extremity: Long finger  Cast removed  Skin healthy to gross inspection, no breakdown  Mild swelling to the PIP joint of the long finger  Tender to palpation over the PIP joint of the long  No ecchymosis noted  No swan-neck deformity present  Patient able to flex and extend the DIP joint.  Patient able to flex and extend the PIP joint  Intact flexion and extension of 1st IP joint and finger abduction  Sensation intact to light touch medial / ulnar and radial nerve distribution   Good cap refill     Imaging  XR of the long finger taken reviewed today.  There is a PIP fracture without subluxation however these are not optimal lateral x-rays     Assessment   Patient with an 70 side: right long finger PIP fracture dislocation     Plan:  Patient sustained A significant injury to her PIP joint of her long finger.  She sustained a PIP fracture dislocation.  The volar lip of of the base of  her middle phalanx has fractured which causes instability of her PIP joint.  She underwent a closed reduction and attempt of splinting in the emergency department.  She was splinted in extension which is an unstable position.  She has since subluxated dorsally.  At her visit 2 weeks ago we did reduce her finger and placed her into a cast.  Cast was removed today and x-rays were repeated and it appears that the joint is reduced.  At this point we will transition her to a contender brace.  She should wear this at all times.  She will follow-up in 1 week for repeat clinical examination.  At that point we will likely get her involved in the hand therapy in a week    Follow-up 1 week with repeat x-rays out of brace

## 2024-08-27 ENCOUNTER — OFFICE VISIT (OUTPATIENT)
Dept: ORTHOPEDIC SURGERY | Facility: HOSPITAL | Age: 89
End: 2024-08-27
Payer: MEDICARE

## 2024-08-27 ENCOUNTER — HOSPITAL ENCOUNTER (OUTPATIENT)
Dept: RADIOLOGY | Facility: HOSPITAL | Age: 89
Discharge: HOME | End: 2024-08-27
Payer: MEDICARE

## 2024-08-27 DIAGNOSIS — S69.91XA FINGER INJURY, RIGHT, INITIAL ENCOUNTER: ICD-10-CM

## 2024-08-27 DIAGNOSIS — S69.91XA FINGER INJURY, RIGHT, INITIAL ENCOUNTER: Primary | ICD-10-CM

## 2024-08-27 PROCEDURE — 1159F MED LIST DOCD IN RCRD: CPT | Performed by: STUDENT IN AN ORGANIZED HEALTH CARE EDUCATION/TRAINING PROGRAM

## 2024-08-27 PROCEDURE — 99212 OFFICE O/P EST SF 10 MIN: CPT | Performed by: STUDENT IN AN ORGANIZED HEALTH CARE EDUCATION/TRAINING PROGRAM

## 2024-08-27 PROCEDURE — 73140 X-RAY EXAM OF FINGER(S): CPT | Mod: RT

## 2024-08-27 ASSESSMENT — PAIN SCALES - GENERAL: PAINLEVEL_OUTOF10: 0 - NO PAIN

## 2024-08-27 ASSESSMENT — PAIN - FUNCTIONAL ASSESSMENT: PAIN_FUNCTIONAL_ASSESSMENT: 0-10

## 2024-09-04 ENCOUNTER — LAB (OUTPATIENT)
Dept: LAB | Facility: LAB | Age: 89
End: 2024-09-04
Payer: MEDICARE

## 2024-09-04 DIAGNOSIS — N18.31 CHRONIC RENAL IMPAIRMENT, STAGE 3A (MULTI): ICD-10-CM

## 2024-09-04 DIAGNOSIS — E03.9 HYPOTHYROIDISM, UNSPECIFIED TYPE: ICD-10-CM

## 2024-09-04 DIAGNOSIS — M81.0 OSTEOPOROSIS, UNSPECIFIED OSTEOPOROSIS TYPE, UNSPECIFIED PATHOLOGICAL FRACTURE PRESENCE: ICD-10-CM

## 2024-09-04 DIAGNOSIS — N18.31 CHRONIC RENAL IMPAIRMENT, STAGE 3A (MULTI): Primary | ICD-10-CM

## 2024-09-04 LAB
APPEARANCE UR: CLEAR
BILIRUB UR STRIP.AUTO-MCNC: NEGATIVE MG/DL
COLOR UR: NORMAL
GLUCOSE UR STRIP.AUTO-MCNC: NORMAL MG/DL
KETONES UR STRIP.AUTO-MCNC: NEGATIVE MG/DL
LEUKOCYTE ESTERASE UR QL STRIP.AUTO: NEGATIVE
NITRITE UR QL STRIP.AUTO: NEGATIVE
PH UR STRIP.AUTO: 7.5 [PH]
PROT UR STRIP.AUTO-MCNC: NEGATIVE MG/DL
RBC # UR STRIP.AUTO: NEGATIVE /UL
SP GR UR STRIP.AUTO: 1.02
UROBILINOGEN UR STRIP.AUTO-MCNC: NORMAL MG/DL

## 2024-09-04 PROCEDURE — 84439 ASSAY OF FREE THYROXINE: CPT

## 2024-09-04 PROCEDURE — 36415 COLL VENOUS BLD VENIPUNCTURE: CPT

## 2024-09-04 PROCEDURE — 84443 ASSAY THYROID STIM HORMONE: CPT

## 2024-09-04 PROCEDURE — 80053 COMPREHEN METABOLIC PANEL: CPT

## 2024-09-05 ENCOUNTER — EVALUATION (OUTPATIENT)
Dept: OCCUPATIONAL THERAPY | Facility: HOSPITAL | Age: 89
End: 2024-09-05
Payer: MEDICARE

## 2024-09-05 DIAGNOSIS — S69.91XA FINGER INJURY, RIGHT, INITIAL ENCOUNTER: Primary | ICD-10-CM

## 2024-09-05 LAB
ALBUMIN SERPL BCP-MCNC: 4.2 G/DL (ref 3.4–5)
ALP SERPL-CCNC: 113 U/L (ref 33–136)
ALT SERPL W P-5'-P-CCNC: 14 U/L (ref 7–45)
ANION GAP SERPL CALC-SCNC: 13 MMOL/L (ref 10–20)
AST SERPL W P-5'-P-CCNC: 21 U/L (ref 9–39)
BILIRUB SERPL-MCNC: 0.4 MG/DL (ref 0–1.2)
BUN SERPL-MCNC: 32 MG/DL (ref 6–23)
CALCIUM SERPL-MCNC: 10.4 MG/DL (ref 8.6–10.6)
CHLORIDE SERPL-SCNC: 103 MMOL/L (ref 98–107)
CO2 SERPL-SCNC: 29 MMOL/L (ref 21–32)
CREAT SERPL-MCNC: 1.67 MG/DL (ref 0.5–1.05)
EGFRCR SERPLBLD CKD-EPI 2021: 29 ML/MIN/1.73M*2
GLUCOSE SERPL-MCNC: 102 MG/DL (ref 74–99)
POTASSIUM SERPL-SCNC: 4.7 MMOL/L (ref 3.5–5.3)
PROT SERPL-MCNC: 6.3 G/DL (ref 6.4–8.2)
SODIUM SERPL-SCNC: 140 MMOL/L (ref 136–145)
T4 FREE SERPL-MCNC: 1.45 NG/DL (ref 0.78–1.48)
TSH SERPL-ACNC: 4.56 MIU/L (ref 0.44–3.98)

## 2024-09-05 PROCEDURE — 97165 OT EVAL LOW COMPLEX 30 MIN: CPT | Mod: GO | Performed by: OCCUPATIONAL THERAPIST

## 2024-09-05 PROCEDURE — 97110 THERAPEUTIC EXERCISES: CPT | Mod: GO | Performed by: OCCUPATIONAL THERAPIST

## 2024-09-05 ASSESSMENT — ENCOUNTER SYMPTOMS
OCCASIONAL FEELINGS OF UNSTEADINESS: 0
DEPRESSION: 0
LOSS OF SENSATION IN FEET: 0

## 2024-09-05 NOTE — PROGRESS NOTES
Occupational Therapy  Occupational Therapy Orthopedic Evaluation    Patient Name: Josee Henson  MRN: [id number]  Today's Date: 9/5/2024       Insurance:  Visit number: 1      General:  Reason for visit: R MF  Referred by: Dr. Osorio    Current Problem  1. Finger injury, right, initial encounter  Referral to Occupational Therapy          Precautions: AROM       Medical History Form: Reviewed (scanned into chart)    Subjective:   Chief Complaint: R MF  TANVI: Fall   DOI: 07/28/2024      Hand Dominance: Right    Current Condition since injury:   better      PAIN     Location: R MF  Intensity: 0/10  Description: no reports of pain    Relevant Information (PMH & Previous Tests/Imaging): reviewed in chart    Prior Level of Function (PLOF)  Exercise/Physical Activity: n/a  Work/School: retired  Current ADL/IADL Status: independent with homemaking     Patients Living Environment: Reviewed and no concern    Primary Language: English    Pt goals for therapy: improve functional use of hand for opening jars    Red Flags: Do you have any of the following? No  Fever/chills, unexplained weight changes, dizziness/fainting, unexplained change in bowel or bladder functions, unexplained malaise or muscle weakness, night pain/sweats, numbness or tingling    Objective:     Distance from tips to palm  2nd: 0 cm  3rd: 0 cm  4th: 0 cm  5th: 0 cm  MF extension/flexion  MCP:   PIP: 0-90  DIP: 0-60    Physical Observation: mild edema along PIP    Numbness/Tingling: no reports of numbness/tingling    Outcome Measures:  Quick DASH: 13.63    EDUCATION: home exercise program, plan of care, activity modifications, pain management, and injury pathology       Goals:      Plan of care was developed with input and agreement by the patient    Treatments:     Low complexity evaluation  15 min      Therapeutic Exercise:   15 min  HEP education and completion: hook fist, DIP blocking, PIP blocking, composite flexion stretch, and PIP flexion stretch      Good rehab potential    Assessment: Patient is a 89 yo female  s/p R MF fracture/dislocation resulting in limited participation in pain-free ADLs and inability to perform at their prior level of function. Pt would benefit from occupational therapy to address the impairments found & listed previously in the objective section in order to return to safe and pain-free ADLs and prior level of function.    Patient reports significant improvement in functional use of hand since MD follow up. Patient issued home program and was instructed to call to schedule additional visits if she doesn't continue to progress with motion.       Plan:    Patient to follow up if needed.      Oziel Reynoso, OT

## 2024-09-06 NOTE — PROGRESS NOTES
History of Present Illness   Patient presents today for evaluation of side: right upper extremity pain.    The patient sustained an acute injury on  7/28/2024 .   The patient sustained a fall onto her right hand.  She noticed right long finger pain.  She went to the emergency department on 7/29/2024 for a PIP joint dislocation.  He was closed reduced and splinted in an AlumaFoam splint in full extension.  She has removed it for hygiene purposes however she states she has kept her finger fully extended.  The patient denies any loss of consciousness or additional significant injuries.  The patient denies any current numbness or tingling.  The pain is sharp, acute in nature, better with rest worse with motion.    8/20/2024 follow-up:  Patient has been in a cast since her last visit.  She notes that she was not tolerating it well.  She notes it significantly limited her function and would prefer not to return to a cast.  She notes her pain has been minimal.  She denies numbness    8/27/2024  She has been wearing her Velcro removable brace.  She notes improvement in her pain.  She denies numbness and tingling.     Past Medical History:   Diagnosis Date    Aortic stenosis     Hyperlipidemia     Hypertension        Medication Documentation Review Audit       Reviewed by Suzan Henderson MA (Medical Assistant) on 08/27/24 at 1443      Medication Order Taking? Sig Documenting Provider Last Dose Status   acetaminophen (Tylenol Extra Strength) 500 mg tablet [id number] No Take 2 tablets (1,000 mg) by mouth every 8 hours if needed for mild pain (1 - 3), headaches or fever (temp greater than 38.0 C). As directed. Historical Provider, MD Taking Active   amoxicillin (Amoxil) 500 mg capsule [id number] No TAKE 4 CAPSULES BY MOUTH 1 HOUR BEFORE PROCEDURE Historical Provider, MD Taking Active   aspirin (Ecotrin Low Strength) 81 mg EC tablet [id number] No Take 1 tablet (81 mg) by mouth once daily at bedtime. Historical Provider, MD  Taking Active   atorvastatin (Lipitor) 40 mg tablet [id number]  Take 1 tablet (40 mg) by mouth once daily. Bashir Smith MD  Active   biotin 5 mg capsule [id number] No Take 1 capsule (5 mg) by mouth once daily. Historical MD Tia Taking Active   calcium carbonate-vitamin D3 (Oscal-500) 500 mg-10 mcg (400 unit) tablet [id number] No Take 2 tablets by mouth once daily at bedtime. Manjinder Weber MD Taking Active   cyclobenzaprine (Flexeril) 5 mg tablet [id number]  Take 1 tablet (5 mg) by mouth 3 times a day as needed for muscle spasms. Bashir Smith MD  Active   denosumab (Prolia) 60 mg/mL syringe [id number] No Inject 1 mL (60 mg) under the skin every 6 months. Manjinder Weber MD Taking Active   fexofenadine (Allegra Allergy) 180 mg tablet [id number] No Take 1 tablet (180 mg) by mouth once daily. Manjinder Weber MD Taking Active   flaxseed oiL 1,000 mg capsule [id number] No Take 1 capsule (1,000 mg) by mouth 2 times a day. Historical MD Tia Taking Active   fluticasone (Flonase) 50 mcg/actuation nasal spray [id number] No Administer 2 sprays into each nostril once daily. Historical MD Tia Taking Active   levothyroxine (Synthroid, Levoxyl) 137 mcg tablet [id number]  Take 1 tablet (137 mcg) by mouth once daily. Leonila Bocanegra MD  Active   multivitamin with minerals tablet [id number] No Take 1 tablet by mouth once daily. Manjinder Weber MD Taking Active   omeprazole (PriLOSEC) 20 mg DR capsule [id number] No Take 1 capsule (20 mg) by mouth once daily. Manjinder Weber MD Taking Active   quiNINE (Qualaquin) 324 mg capsule [id number] No Take 1 capsule (324 mg) by mouth once daily at bedtime. For leg cramps Manjinder Weber MD Taking Active   valsartan (Diovan) 40 mg tablet [id number] No Take 1 tablet (40 mg) by mouth 2 times a day. Elisa Hahn, APRN-CNP Taking Active   zolpidem (Ambien) 5 mg tablet [id number] No Take 1 tablet (5 mg) by mouth as needed at bedtime for sleep. Bashir Smith MD  Taking Active                    Allergies   Allergen Reactions    Tetracycline Rash and Unknown       Social History     Socioeconomic History    Marital status:      Spouse name: Not on file    Number of children: Not on file    Years of education: Not on file    Highest education level: Not on file   Occupational History    Not on file   Tobacco Use    Smoking status: Never    Smokeless tobacco: Never   Substance and Sexual Activity    Alcohol use: Yes     Alcohol/week: 1.0 standard drink of alcohol     Types: 1 Glasses of wine per week    Drug use: Never    Sexual activity: Not on file   Other Topics Concern    Not on file   Social History Narrative    Not on file     Social Determinants of Health     Financial Resource Strain: Low Risk  (2/23/2024)    Overall Financial Resource Strain (CARDIA)     Difficulty of Paying Living Expenses: Not very hard   Food Insecurity: Not on file   Transportation Needs: No Transportation Needs (2/23/2024)    PRAPARE - Transportation     Lack of Transportation (Medical): No     Lack of Transportation (Non-Medical): No   Physical Activity: Not on file   Stress: Not on file   Social Connections: Not on file   Intimate Partner Violence: Not on file   Housing Stability: Low Risk  (2/23/2024)    Housing Stability Vital Sign     Unable to Pay for Housing in the Last Year: No     Number of Places Lived in the Last Year: 1     Unstable Housing in the Last Year: No       Past Surgical History:   Procedure Laterality Date    CARDIAC CATHETERIZATION N/A 2/2/2024    Procedure: Left And Right Heart Cath, With LV;  Surgeon: Pérez Traylor MD;  Location: [facility] Cardiac Cath Lab;  Service: Cardiovascular;  Laterality: N/A;  Hydration needed @ 8:30 AM    CARDIAC CATHETERIZATION N/A 2/23/2024    Procedure: TAVR (Transcatheter AV Replacement);  Surgeon: Braydon Correa MD;  Location: [place] [address] Cardiac Cath Lab;  Service: Cardiovascular;  Laterality: N/A;  Medtronic, Evolut FX 26     CARDIAC CATHETERIZATION N/A 2/23/2024    Procedure: TVP for TAVR;  Surgeon: Braydon Correa MD;  Location: [place] [address] Cardiac Cath Lab;  Service: Cardiovascular;  Laterality: N/A;    CARDIAC CATHETERIZATION N/A 2/23/2024    Procedure: TAVR (Transcatheter AV Replacement);  Surgeon: Brandi Serrano MD;  Location: [place] [address] Cardiac Cath Lab;  Service: Cardiac Surgery;  Laterality: N/A;    CARDIAC ELECTROPHYSIOLOGY PROCEDURE Left 2/26/2024    Procedure: PPM IMPLANT DUAL;  Surgeon: Marie Bishop MD;  Location: [place] [address] Cardiac Cath Lab;  Service: Electrophysiology;  Laterality: Left;  CI[employer] company will be determined by either Dr. Douglas or Dr. Bishop.    COLONOSCOPY  04/15/2014    Complete Colonoscopy    OTHER SURGICAL HISTORY  04/15/2014    Revision Of Total Knee Arthroplasty    TOTAL KNEE ARTHROPLASTY  04/15/2014    Total Knee Arthroplasty          Review of Systems   GENERAL: Negative  GI: Negative  MUSCULOSKELETAL: See HPI  SKIN: Negative  NEURO:  Negative     Physical Exam:  side: right upper extremity: Long finger  Velcro movable brace removed  Skin healthy to gross inspection, no breakdown  Mild swelling to the PIP joint of the long finger  Tender to palpation over the PIP joint of the long  No ecchymosis noted  swan-neck deformity present  Patient able to flex and extend the DIP joint.  Patient able to flex and extend the PIP joint  Intact flexion and extension of 1st IP joint and finger abduction  Sensation intact to light touch medial / ulnar and radial nerve distribution   Good cap refill     Imaging  XR of the long finger taken reviewed today.  PIP joint is reduced.  There is no dislocation or subluxation noted.  Assessment     Patient with an right long finger PIP fracture dislocation which is reduced however she does have a swan-neck deformity.     Plan:  Patient sustained A significant injury to her PIP joint of her long finger.  She sustained a PIP fracture dislocation.  The volar lip  of of the base of her middle phalanx has fractured which causes instability of her PIP joint.  She underwent a closed reduction in office and was placed into a cast for 2 weeks.  She has been in a removable brace for 1 week.  At this point I did try to give her a oval 8 brace to prevent her swan-neck deformity however they do not fit her appropriately.  I will refer her to occupational therapy for thermoplastic splint made to prevent swan-neck deformity.  This will place her PIP joint in slight flexion..  She will also work on range of motion.  She will follow-up in 4 weeks    Follow-up 4 weeks with repeat x-rays of the right long finger

## 2024-10-01 ENCOUNTER — OFFICE VISIT (OUTPATIENT)
Dept: ORTHOPEDIC SURGERY | Facility: HOSPITAL | Age: 89
End: 2024-10-01
Payer: MEDICARE

## 2024-10-01 ENCOUNTER — HOSPITAL ENCOUNTER (OUTPATIENT)
Dept: RADIOLOGY | Facility: HOSPITAL | Age: 89
Discharge: HOME | End: 2024-10-01
Payer: MEDICARE

## 2024-10-01 VITALS — WEIGHT: 157 LBS | BODY MASS INDEX: 29.64 KG/M2 | HEIGHT: 61 IN

## 2024-10-01 DIAGNOSIS — M79.644 FINGER PAIN, RIGHT: ICD-10-CM

## 2024-10-01 DIAGNOSIS — M79.644 FINGER PAIN, RIGHT: Primary | ICD-10-CM

## 2024-10-01 PROCEDURE — 73130 X-RAY EXAM OF HAND: CPT | Mod: RIGHT SIDE | Performed by: RADIOLOGY

## 2024-10-01 PROCEDURE — 99212 OFFICE O/P EST SF 10 MIN: CPT | Performed by: STUDENT IN AN ORGANIZED HEALTH CARE EDUCATION/TRAINING PROGRAM

## 2024-10-01 PROCEDURE — 73130 X-RAY EXAM OF HAND: CPT | Mod: RT

## 2024-10-01 PROCEDURE — 1036F TOBACCO NON-USER: CPT | Performed by: STUDENT IN AN ORGANIZED HEALTH CARE EDUCATION/TRAINING PROGRAM

## 2024-10-01 PROCEDURE — 1159F MED LIST DOCD IN RCRD: CPT | Performed by: STUDENT IN AN ORGANIZED HEALTH CARE EDUCATION/TRAINING PROGRAM

## 2024-10-01 ASSESSMENT — PAIN - FUNCTIONAL ASSESSMENT: PAIN_FUNCTIONAL_ASSESSMENT: NO/DENIES PAIN

## 2024-10-01 NOTE — PROGRESS NOTES
History of Present Illness   Patient presents today for evaluation of side: right upper extremity pain.    The patient sustained an acute injury on  7/28/2024 .   The patient sustained a fall onto her right hand.  She noticed right long finger pain.  She went to the emergency department on 7/29/2024 for a PIP joint dislocation.  He was closed reduced and splinted in an AlumaFoam splint in full extension.  She has removed it for hygiene purposes however she states she has kept her finger fully extended.  The patient denies any loss of consciousness or additional significant injuries.  The patient denies any current numbness or tingling.  The pain is sharp, acute in nature, better with rest worse with motion.    8/20/2024 follow-up:  Patient has been in a cast since her last visit.  She notes that she was not tolerating it well.  She notes it significantly limited her function and would prefer not to return to a cast.  She notes her pain has been minimal.  She denies numbness    8/27/2024  She has been wearing her Velcro removable brace.  She notes improvement in her pain.  She denies numbness and tingling.    10/1/2024 follow-up:  Patient presents for follow-up today.  She has been doing well.  She has been working on range of motion to her right long finger.  She denies numbness tingling.  She denies any new injuries.    Past Medical History:   Diagnosis Date    Aortic stenosis     Hyperlipidemia     Hypertension        Medication Documentation Review Audit       Reviewed by Lisa Olivares on 10/01/24 at 1323      Medication Order Taking? Sig Documenting Provider Last Dose Status   acetaminophen (Tylenol Extra Strength) 500 mg tablet [id number] No Take 2 tablets (1,000 mg) by mouth every 8 hours if needed for mild pain (1 - 3), headaches or fever (temp greater than 38.0 C). As directed. Historical Provider, MD Taking Active   amoxicillin (Amoxil) 500 mg capsule [id number] No TAKE 4 CAPSULES BY MOUTH 1 HOUR BEFORE  PROCEDURE Manjinder Weber MD Taking Active   aspirin (Ecotrin Low Strength) 81 mg EC tablet [id number] No Take 1 tablet (81 mg) by mouth once daily at bedtime. Historical Tia, MD Taking Active   atorvastatin (Lipitor) 40 mg tablet [id number]  Take 1 tablet (40 mg) by mouth once daily. Bashir Smith MD  Active   biotin 5 mg capsule [id number] No Take 1 capsule (5 mg) by mouth once daily. Historical Tia, MD Taking Active   calcium carbonate-vitamin D3 (Oscal-500) 500 mg-10 mcg (400 unit) tablet [id number] No Take 2 tablets by mouth once daily at bedtime. Historical Provider, MD Taking Active   cyclobenzaprine (Flexeril) 5 mg tablet [id number]  Take 1 tablet (5 mg) by mouth 3 times a day as needed for muscle spasms. Bashir Smith MD  Active   denosumab (Prolia) 60 mg/mL syringe [id number] No Inject 1 mL (60 mg) under the skin every 6 months. Historical MD Tia Taking Active   fexofenadine (Allegra Allergy) 180 mg tablet [id number] No Take 1 tablet (180 mg) by mouth once daily. Historical Provider, MD Taking Active   flaxseed oiL 1,000 mg capsule [id number] No Take 1 capsule (1,000 mg) by mouth 2 times a day. Historical MD Tia Taking Active   fluticasone (Flonase) 50 mcg/actuation nasal spray [id number] No Administer 2 sprays into each nostril once daily. Historical MD Tia Taking Active   levothyroxine (Synthroid, Levoxyl) 137 mcg tablet [id number]  Take 1 tablet (137 mcg) by mouth once daily. Leonila Bocanegra MD  Active   multivitamin with minerals tablet [id number] No Take 1 tablet by mouth once daily. Historical MD Tia Taking Active   omeprazole (PriLOSEC) 20 mg DR capsule [id number] No Take 1 capsule (20 mg) by mouth once daily. Historical MD Tia Taking Active   quiNINE (Qualaquin) 324 mg capsule [id number] No Take 1 capsule (324 mg) by mouth once daily at bedtime. For leg cramps Manjinder Weber MD Taking Active   valsartan (Diovan) 40 mg tablet [id number] No Take 1 tablet (40 mg)  by mouth 2 times a day. Elisa Hahn, APRN-CNP Taking Active   zolpidem (Ambien) 5 mg tablet [id number] No Take 1 tablet (5 mg) by mouth as needed at bedtime for sleep. Bashir Smith MD Taking Active                    Allergies   Allergen Reactions    Tetracycline Rash and Unknown       Social History     Socioeconomic History    Marital status:      Spouse name: Not on file    Number of children: Not on file    Years of education: Not on file    Highest education level: Not on file   Occupational History    Not on file   Tobacco Use    Smoking status: Never    Smokeless tobacco: Never   Substance and Sexual Activity    Alcohol use: Yes     Alcohol/week: 1.0 standard drink of alcohol     Types: 1 Glasses of wine per week    Drug use: Never    Sexual activity: Not on file   Other Topics Concern    Not on file   Social History Narrative    Not on file     Social Determinants of Health     Financial Resource Strain: Low Risk  (2/23/2024)    Overall Financial Resource Strain (CARDIA)     Difficulty of Paying Living Expenses: Not very hard   Food Insecurity: Not on file   Transportation Needs: No Transportation Needs (2/23/2024)    PRAPARE - Transportation     Lack of Transportation (Medical): No     Lack of Transportation (Non-Medical): No   Physical Activity: Not on file   Stress: Not on file   Social Connections: Not on file   Intimate Partner Violence: Not on file   Housing Stability: Low Risk  (2/23/2024)    Housing Stability Vital Sign     Unable to Pay for Housing in the Last Year: No     Number of Places Lived in the Last Year: 1     Unstable Housing in the Last Year: No       Past Surgical History:   Procedure Laterality Date    CARDIAC CATHETERIZATION N/A 2/2/2024    Procedure: Left And Right Heart Cath, With LV;  Surgeon: Pérez Traylor MD;  Location: [facility] Cardiac Cath Lab;  Service: Cardiovascular;  Laterality: N/A;  Hydration needed @ 8:30 AM    CARDIAC CATHETERIZATION N/A 2/23/2024    Procedure:  TAVR (Transcatheter AV Replacement);  Surgeon: Braydon Correa MD;  Location: [place] [address] Cardiac Cath Lab;  Service: Cardiovascular;  Laterality: N/A;  [employer]tr[employer], Evolut FX 26    CARDIAC CATHETERIZATION N/A 2/23/2024    Procedure: TVP for TAVR;  Surgeon: Braydon Correa MD;  Location: [place] [address] Cardiac Cath Lab;  Service: Cardiovascular;  Laterality: N/A;    CARDIAC CATHETERIZATION N/A 2/23/2024    Procedure: TAVR (Transcatheter AV Replacement);  Surgeon: Brandi Serrano MD;  Location: [place] [address] Cardiac Cath Lab;  Service: Cardiac Surgery;  Laterality: N/A;    CARDIAC ELECTROPHYSIOLOGY PROCEDURE Left 2/26/2024    Procedure: PPM IMPLANT DUAL;  Surgeon: Marie Bishop MD;  Location: [place] [address] Cardiac Cath Lab;  Service: Electrophysiology;  Laterality: Left;  [employer] company will be determined by either Dr. Douglas or Dr. Bishop.    COLONOSCOPY  04/15/2014    Complete Colonoscopy    OTHER SURGICAL HISTORY  04/15/2014    Revision Of Total Knee Arthroplasty    TOTAL KNEE ARTHROPLASTY  04/15/2014    Total Knee Arthroplasty          Review of Systems   GENERAL: Negative  GI: Negative  MUSCULOSKELETAL: See HPI  SKIN: Negative  NEURO:  Negative     Physical Exam:  side: right upper extremity: Long finger  Skin healthy to gross inspection, no breakdown  Minimal swelling to the PIP joint of the long finger  No tenderness to palpation over the PIP joint of the long  No ecchymosis noted  No swan-neck deformity present.  Patient able to flex and extend the DIP joint.  Patient able to flex and extend the PIP joint  She is able to bring the pulp of her distal phalanx to the palm.  However she is still approximately 1 cm from the distal palmar crease  Intact flexion and extension of 1st IP joint and finger abduction  Sensation intact to light touch medial / ulnar and radial nerve distribution   Good cap refill     Imaging  XR of the right hand were taken reviewed in office today.  There are no fractures or  dislocations noted.  The PIP joint is reduced.    Patient with an right long finger PIP fracture dislocation which is well reduced.     Plan:  Patient sustained A significant injury to her PIP joint of her long finger.  She sustained a PIP fracture dislocation.  The volar lip of of the base of her middle phalanx has fractured which causes instability of her PIP joint.  She underwent a closed reduction in office and was placed into a cast for 2 weeks.  She has made appropriate gains and has close to full range of motion.  She is happy with her results.  At this point she may follow-up as needed.    She has no restrictions she can be weightbearing as tolerated

## 2024-10-07 DIAGNOSIS — F51.01 PRIMARY INSOMNIA: ICD-10-CM

## 2024-10-07 RX ORDER — ZOLPIDEM TARTRATE 5 MG/1
5 TABLET ORAL NIGHTLY PRN
Qty: 30 TABLET | Refills: 3 | Status: SHIPPED | OUTPATIENT
Start: 2024-10-07

## 2024-10-14 ENCOUNTER — HOSPITAL ENCOUNTER (OUTPATIENT)
Dept: CARDIOLOGY | Facility: CLINIC | Age: 89
Discharge: HOME | End: 2024-10-14
Payer: MEDICARE

## 2024-10-14 DIAGNOSIS — Z95.0 CARDIAC PACEMAKER IN SITU: ICD-10-CM

## 2024-10-14 DIAGNOSIS — I44.30 AVB (ATRIOVENTRICULAR BLOCK): ICD-10-CM

## 2024-10-14 PROCEDURE — 93296 REM INTERROG EVL PM/IDS: CPT

## 2024-10-17 ENCOUNTER — TELEPHONE (OUTPATIENT)
Dept: ENDOCRINOLOGY | Facility: CLINIC | Age: 89
End: 2024-10-17
Payer: MEDICARE

## 2024-10-17 NOTE — TELEPHONE ENCOUNTER
Patient needs to have a tooth pulled  and is dentist is wondering how far in away from the prolia injection should pat have tooth pulled. Pt is due in early January for next prolia.

## 2024-10-29 DIAGNOSIS — Z00.00 ANNUAL PHYSICAL EXAM: ICD-10-CM

## 2024-11-08 ENCOUNTER — APPOINTMENT (OUTPATIENT)
Dept: PRIMARY CARE | Facility: CLINIC | Age: 89
End: 2024-11-08
Payer: MEDICARE

## 2024-11-09 ENCOUNTER — LAB (OUTPATIENT)
Dept: LAB | Facility: LAB | Age: 89
End: 2024-11-09
Payer: MEDICARE

## 2024-11-09 DIAGNOSIS — Z00.00 ANNUAL PHYSICAL EXAM: ICD-10-CM

## 2024-11-09 LAB
25(OH)D3 SERPL-MCNC: 66 NG/ML (ref 30–100)
ALBUMIN SERPL BCP-MCNC: 4.1 G/DL (ref 3.4–5)
ALP SERPL-CCNC: 88 U/L (ref 33–136)
ALT SERPL W P-5'-P-CCNC: 14 U/L (ref 7–45)
ANION GAP SERPL CALC-SCNC: 14 MMOL/L (ref 10–20)
AST SERPL W P-5'-P-CCNC: 20 U/L (ref 9–39)
BASOPHILS # BLD AUTO: 0.07 X10*3/UL (ref 0–0.1)
BASOPHILS NFR BLD AUTO: 1 %
BILIRUB SERPL-MCNC: 0.5 MG/DL (ref 0–1.2)
BUN SERPL-MCNC: 30 MG/DL (ref 6–23)
CALCIUM SERPL-MCNC: 10.4 MG/DL (ref 8.6–10.6)
CHLORIDE SERPL-SCNC: 106 MMOL/L (ref 98–107)
CHOLEST SERPL-MCNC: 190 MG/DL (ref 0–199)
CHOLESTEROL/HDL RATIO: 2.3
CO2 SERPL-SCNC: 24 MMOL/L (ref 21–32)
CREAT SERPL-MCNC: 1.67 MG/DL (ref 0.5–1.05)
EGFRCR SERPLBLD CKD-EPI 2021: 29 ML/MIN/1.73M*2
EOSINOPHIL # BLD AUTO: 0.25 X10*3/UL (ref 0–0.4)
EOSINOPHIL NFR BLD AUTO: 3.7 %
ERYTHROCYTE [DISTWIDTH] IN BLOOD BY AUTOMATED COUNT: 14.9 % (ref 11.5–14.5)
EST. AVERAGE GLUCOSE BLD GHB EST-MCNC: 123 MG/DL
GLUCOSE SERPL-MCNC: 115 MG/DL (ref 74–99)
HBA1C MFR BLD: 5.9 %
HCT VFR BLD AUTO: 37.9 % (ref 36–46)
HDLC SERPL-MCNC: 81.7 MG/DL
HGB BLD-MCNC: 12 G/DL (ref 12–16)
IMM GRANULOCYTES # BLD AUTO: 0.03 X10*3/UL (ref 0–0.5)
IMM GRANULOCYTES NFR BLD AUTO: 0.4 % (ref 0–0.9)
LDLC SERPL CALC-MCNC: 87 MG/DL
LYMPHOCYTES # BLD AUTO: 1.73 X10*3/UL (ref 0.8–3)
LYMPHOCYTES NFR BLD AUTO: 25.9 %
MCH RBC QN AUTO: 28.5 PG (ref 26–34)
MCHC RBC AUTO-ENTMCNC: 31.7 G/DL (ref 32–36)
MCV RBC AUTO: 90 FL (ref 80–100)
MONOCYTES # BLD AUTO: 0.48 X10*3/UL (ref 0.05–0.8)
MONOCYTES NFR BLD AUTO: 7.2 %
NEUTROPHILS # BLD AUTO: 4.13 X10*3/UL (ref 1.6–5.5)
NEUTROPHILS NFR BLD AUTO: 61.8 %
NON HDL CHOLESTEROL: 108 MG/DL (ref 0–149)
NRBC BLD-RTO: 0 /100 WBCS (ref 0–0)
PLATELET # BLD AUTO: 228 X10*3/UL (ref 150–450)
POTASSIUM SERPL-SCNC: 4.3 MMOL/L (ref 3.5–5.3)
PROT SERPL-MCNC: 6.2 G/DL (ref 6.4–8.2)
RBC # BLD AUTO: 4.21 X10*6/UL (ref 4–5.2)
SODIUM SERPL-SCNC: 140 MMOL/L (ref 136–145)
T4 FREE SERPL-MCNC: 1.15 NG/DL (ref 0.78–1.48)
TRIGL SERPL-MCNC: 106 MG/DL (ref 0–149)
TSH SERPL-ACNC: 5.01 MIU/L (ref 0.44–3.98)
VLDL: 21 MG/DL (ref 0–40)
WBC # BLD AUTO: 6.7 X10*3/UL (ref 4.4–11.3)

## 2024-11-09 PROCEDURE — 36415 COLL VENOUS BLD VENIPUNCTURE: CPT

## 2024-11-09 PROCEDURE — 80061 LIPID PANEL: CPT

## 2024-11-09 PROCEDURE — 85025 COMPLETE CBC W/AUTO DIFF WBC: CPT

## 2024-11-09 PROCEDURE — 80053 COMPREHEN METABOLIC PANEL: CPT

## 2024-11-09 PROCEDURE — 84443 ASSAY THYROID STIM HORMONE: CPT

## 2024-11-09 PROCEDURE — 83036 HEMOGLOBIN GLYCOSYLATED A1C: CPT

## 2024-11-09 PROCEDURE — 81001 URINALYSIS AUTO W/SCOPE: CPT

## 2024-11-09 PROCEDURE — 82306 VITAMIN D 25 HYDROXY: CPT

## 2024-11-09 PROCEDURE — 84439 ASSAY OF FREE THYROXINE: CPT

## 2024-11-10 LAB
APPEARANCE UR: CLEAR
BILIRUB UR STRIP.AUTO-MCNC: NEGATIVE MG/DL
CAOX CRY #/AREA UR COMP ASSIST: ABNORMAL /HPF
COLOR UR: ABNORMAL
GLUCOSE UR STRIP.AUTO-MCNC: NORMAL MG/DL
HYALINE CASTS #/AREA URNS AUTO: ABNORMAL /LPF
KETONES UR STRIP.AUTO-MCNC: NEGATIVE MG/DL
LEUKOCYTE ESTERASE UR QL STRIP.AUTO: ABNORMAL
MUCOUS THREADS #/AREA URNS AUTO: ABNORMAL /LPF
NITRITE UR QL STRIP.AUTO: NEGATIVE
PH UR STRIP.AUTO: 5.5 [PH]
PROT UR STRIP.AUTO-MCNC: ABNORMAL MG/DL
RBC # UR STRIP.AUTO: NEGATIVE /UL
RBC #/AREA URNS AUTO: ABNORMAL /HPF
SP GR UR STRIP.AUTO: 1.02
UROBILINOGEN UR STRIP.AUTO-MCNC: NORMAL MG/DL
WBC #/AREA URNS AUTO: ABNORMAL /HPF

## 2024-11-15 ENCOUNTER — APPOINTMENT (OUTPATIENT)
Dept: PRIMARY CARE | Facility: CLINIC | Age: 89
End: 2024-11-15
Payer: MEDICARE

## 2024-11-15 VITALS
HEIGHT: 61 IN | DIASTOLIC BLOOD PRESSURE: 68 MMHG | OXYGEN SATURATION: 97 % | SYSTOLIC BLOOD PRESSURE: 142 MMHG | HEART RATE: 86 BPM | WEIGHT: 157 LBS | BODY MASS INDEX: 29.64 KG/M2

## 2024-11-15 DIAGNOSIS — Z95.2 S/P TAVR (TRANSCATHETER AORTIC VALVE REPLACEMENT): ICD-10-CM

## 2024-11-15 DIAGNOSIS — M81.0 AGE-RELATED OSTEOPOROSIS WITHOUT CURRENT PATHOLOGICAL FRACTURE: ICD-10-CM

## 2024-11-15 DIAGNOSIS — I10 PRIMARY HYPERTENSION: ICD-10-CM

## 2024-11-15 DIAGNOSIS — M19.032 PRIMARY OSTEOARTHRITIS OF LEFT WRIST: Primary | ICD-10-CM

## 2024-11-15 DIAGNOSIS — K21.9 GASTROESOPHAGEAL REFLUX DISEASE WITHOUT ESOPHAGITIS: ICD-10-CM

## 2024-11-15 DIAGNOSIS — E55.9 VITAMIN D INSUFFICIENCY: ICD-10-CM

## 2024-11-15 DIAGNOSIS — E78.2 MIXED HYPERLIPIDEMIA: ICD-10-CM

## 2024-11-15 DIAGNOSIS — I44.2 COMPLETE HEART BLOCK, POST-SURGICAL: ICD-10-CM

## 2024-11-15 DIAGNOSIS — M54.16 LUMBAR RADICULOPATHY: ICD-10-CM

## 2024-11-15 DIAGNOSIS — E06.3 HYPOTHYROIDISM DUE TO HASHIMOTO THYROIDITIS: ICD-10-CM

## 2024-11-15 DIAGNOSIS — I97.89 COMPLETE HEART BLOCK, POST-SURGICAL: ICD-10-CM

## 2024-11-15 DIAGNOSIS — R26.89 IMPAIRMENT OF BALANCE: ICD-10-CM

## 2024-11-15 PROCEDURE — 1159F MED LIST DOCD IN RCRD: CPT | Performed by: INTERNAL MEDICINE

## 2024-11-15 PROCEDURE — 1036F TOBACCO NON-USER: CPT | Performed by: INTERNAL MEDICINE

## 2024-11-15 PROCEDURE — 3078F DIAST BP <80 MM HG: CPT | Performed by: INTERNAL MEDICINE

## 2024-11-15 PROCEDURE — UHSPHYS PR UH SELECT PHYSICAL: Performed by: INTERNAL MEDICINE

## 2024-11-15 PROCEDURE — 3077F SYST BP >= 140 MM HG: CPT | Performed by: INTERNAL MEDICINE

## 2024-11-15 PROCEDURE — 1126F AMNT PAIN NOTED NONE PRSNT: CPT | Performed by: INTERNAL MEDICINE

## 2024-11-15 ASSESSMENT — ENCOUNTER SYMPTOMS
ADENOPATHY: 0
SHORTNESS OF BREATH: 0
POLYDIPSIA: 0
PALPITATIONS: 0
DIZZINESS: 0
ACTIVITY CHANGE: 0
SLEEP DISTURBANCE: 0
FEVER: 0
NAUSEA: 0
RHINORRHEA: 0
DIARRHEA: 0
ABDOMINAL DISTENTION: 0
HYPERACTIVE: 0
BLOOD IN STOOL: 0
ARTHRALGIAS: 0
CHEST TIGHTNESS: 0
VOMITING: 0
SINUS PRESSURE: 0
CONFUSION: 0
NECK STIFFNESS: 0
UNEXPECTED WEIGHT CHANGE: 0
HEMATURIA: 0
JOINT SWELLING: 0
DYSPHORIC MOOD: 0
COUGH: 0
TROUBLE SWALLOWING: 0
SINUS PAIN: 0
BACK PAIN: 0
SORE THROAT: 0
CHILLS: 0
ABDOMINAL PAIN: 0
WHEEZING: 0
FACIAL SWELLING: 0
FATIGUE: 0
CONSTITUTIONAL NEGATIVE: 1
NUMBNESS: 0
BRUISES/BLEEDS EASILY: 0
MYALGIAS: 0
DYSURIA: 0
HEADACHES: 0
WEAKNESS: 0
NERVOUS/ANXIOUS: 0
CONSTIPATION: 0
FREQUENCY: 0
LIGHT-HEADEDNESS: 0

## 2024-11-15 ASSESSMENT — PAIN SCALES - GENERAL: PAINLEVEL_OUTOF10: 0-NO PAIN

## 2024-11-15 NOTE — ASSESSMENT & PLAN NOTE
Persistent low back pain and fusion with standing.  We will get her a more aggressive physical therapy regimen.  In the in addition she will continue her twice weekly exercise classes which are also working on core strength.

## 2024-11-15 NOTE — ASSESSMENT & PLAN NOTE
TSH slightly above the therapeutic level but given her age I think we will leave it at that dose for now.  She is asymptomatic.

## 2024-11-15 NOTE — PROGRESS NOTES
Josee Henson       FallX 3  Bilat knee pain TAVR CHB pacer standing   Patient is here for a complete physical and a general checkup.  She doing quite well living independently driving and remaining extremely active.  She has had a few issues however.  1.  She has had multiple falls in the last 3 to 4 months.  All of them involve loss of balance or tripping.  She has not had any syncopal or presyncopal symptoms.  Fortunately she has had no significant injuries other than some mild scrapes.  She does use a cane but it is a fairly nonsupportive 1.  She has been to physical therapy in the past for balance and gait.  She does see a  twice a week who has been working with her as well  2.  She is doing extremely well status post TAVR and pacer was placed for complete heart block.  She has had no cardiovascular symptoms she continues to take her cardiac meds without side effects.  3.  She has a little bit of pain in her legs when she stands for prolonged periods of time.  She does seems to resolve when she walks.  She has no other claudication symptoms.  4.  She has chronic hypothyroidism but her symptoms are well-controlled her thyroid hormone levels are slightly below goal but she recently had her levothyroxine dose increased.  Her TSH remains above normal at this point but given her age that is probably an adequate dose.  5.  She has some mild age-related hearing loss she is now wearing hearing aids which are quite helpful for her.           Review of Systems   Constitutional: Negative.  Negative for activity change, chills, fatigue, fever and unexpected weight change.   HENT:  Positive for hearing loss (Now wearing hearing aids). Negative for dental problem, ear pain, facial swelling, mouth sores, rhinorrhea, sinus pressure, sinus pain, sore throat, tinnitus and trouble swallowing.    Eyes:  Negative for visual disturbance.   Respiratory:  Negative for cough, chest tightness, shortness of breath and wheezing.  "   Cardiovascular:  Negative for chest pain, palpitations and leg swelling.   Gastrointestinal:  Negative for abdominal distention, abdominal pain, blood in stool, constipation, diarrhea, nausea and vomiting.   Endocrine: Negative for cold intolerance, heat intolerance, polydipsia and polyuria.   Genitourinary:  Positive for urgency. Negative for dysuria, frequency and hematuria.   Musculoskeletal:  Negative for arthralgias, back pain, joint swelling, myalgias and neck stiffness.   Skin:  Negative for rash.   Allergic/Immunologic: Negative for food allergies.   Neurological:  Negative for dizziness, weakness, light-headedness, numbness and headaches.   Hematological:  Negative for adenopathy. Does not bruise/bleed easily.   Psychiatric/Behavioral:  Negative for confusion, dysphoric mood and sleep disturbance. The patient is not nervous/anxious and is not hyperactive.           Objective      Visit Vitals  /68 (BP Location: Right arm, Patient Position: Sitting, BP Cuff Size: Adult)   Pulse 86   Ht 1.549 m (5' 1\")   Wt 71.2 kg (157 lb)   SpO2 97%   BMI 29.66 kg/m²   Smoking Status Never   BSA 1.75 m²        Physical Exam  Constitutional:       Appearance: Normal appearance. She is normal weight.   HENT:      Head: Normocephalic and atraumatic.      Right Ear: Tympanic membrane, ear canal and external ear normal.      Left Ear: Tympanic membrane, ear canal and external ear normal.      Nose: Nose normal.      Mouth/Throat:      Mouth: Mucous membranes are moist. No oral lesions.      Pharynx: Oropharynx is clear. Uvula midline. No oropharyngeal exudate or posterior oropharyngeal erythema.   Neck:      Thyroid: No thyroid mass or thyromegaly.      Vascular: No carotid bruit or JVD.   Cardiovascular:      Rate and Rhythm: Normal rate and regular rhythm.      Pulses: Normal pulses.           Carotid pulses are 2+ on the right side and 2+ on the left side.       Radial pulses are 2+ on the right side and 2+ on the " left side.        Femoral pulses are 2+ on the right side and 2+ on the left side.       Popliteal pulses are 2+ on the right side and 2+ on the left side.        Dorsalis pedis pulses are 2+ on the right side and 2+ on the left side.        Posterior tibial pulses are 2+ on the right side and 2+ on the left side.      Heart sounds: Normal heart sounds, S1 normal and S2 normal. No murmur heard.  Pulmonary:      Effort: Pulmonary effort is normal.      Breath sounds: Normal breath sounds.   Chest:   Breasts:     Right: Normal. No mass or nipple discharge.      Left: Normal. No mass or nipple discharge.   Abdominal:      General: Abdomen is flat. Bowel sounds are normal.      Palpations: Abdomen is soft.      Tenderness: There is no abdominal tenderness.      Hernia: No hernia is present.   Musculoskeletal:         General: No swelling or tenderness. Normal range of motion.      Cervical back: Normal range of motion and neck supple. No rigidity.      Right lower leg: No edema.      Left lower leg: No edema.   Lymphadenopathy:      Cervical: No cervical adenopathy.   Skin:     General: Skin is warm and dry.      Findings: No lesion or rash.   Neurological:      General: No focal deficit present.      Mental Status: She is alert and oriented to person, place, and time. Mental status is at baseline.   Psychiatric:         Mood and Affect: Mood normal.         Thought Content: Thought content normal.              Assess/Plan SmartLinks:   Problem List Items Addressed This Visit             ICD-10-CM    Osteoarthritis - Primary M19.90     Patient is generalized aches and pains due to osteoarthritis mostly in her hips knees and shoulders.  She has had multiple joint replacements and has had steroid injections with some success in the past.  She will continue to follow-up with me if her symptoms do not improve.  She can take an occasional dose of ibuprofen as needed.         Relevant Orders    Referral to Physical Therapy     Gastroesophageal reflux disease K21.9     GERD symptoms are under good control with omeprazole 20 mg daily.         Hyperlipidemia E78.5     Lipids are at goal on current moderate dose statin Lipitor 40 mg daily.         Hypertension I10     Blood pressure is well-controlled on valsartan 40 mg twice daily.         Hypothyroidism E03.9     TSH slightly above the therapeutic level but given her age I think we will leave it at that dose for now.  She is asymptomatic.         Impairment of balance R26.89     Referral to physical therapy I suggested to her that she start using a walker especially when out going outside on unstable ground.  She is going to look into getting a walker in the near future.         Lumbar radiculopathy M54.16     Persistent low back pain and fusion with standing.  We will get her a more aggressive physical therapy regimen.  In the in addition she will continue her twice weekly exercise classes which are also working on core strength.         Osteoporosis M81.0     Patient has had several falls without fracture which is reassuring she will remain on her calcium and vitamin D supplements for now.         Vitamin D insufficiency E55.9    S/P TAVR (transcatheter aortic valve replacement) Z95.2     Patient doing extremely well status post TAVR she follows up with the TAVR team on a regular basis.  Her valve sounds are good today with no evidence of any murmur.         Complete heart block, post-surgical I97.89, I44.2     Tolerating her pacemaker without difficulty she sees the device clinic team regularly.

## 2024-11-15 NOTE — ASSESSMENT & PLAN NOTE
Patient is generalized aches and pains due to osteoarthritis mostly in her hips knees and shoulders.  She has had multiple joint replacements and has had steroid injections with some success in the past.  She will continue to follow-up with me if her symptoms do not improve.  She can take an occasional dose of ibuprofen as needed.

## 2024-11-15 NOTE — ASSESSMENT & PLAN NOTE
Referral to physical therapy I suggested to her that she start using a walker especially when out going outside on unstable ground.  She is going to look into getting a walker in the near future.

## 2024-11-15 NOTE — ASSESSMENT & PLAN NOTE
Fluoro time = 0 minutes. Total dose = 0 mGy.  Patient doing extremely well status post TAVR she follows up with the TAVR team on a regular basis.  Her valve sounds are good today with no evidence of any murmur.

## 2024-11-15 NOTE — ASSESSMENT & PLAN NOTE
Patient has had several falls without fracture which is reassuring she will remain on her calcium and vitamin D supplements for now.

## 2024-11-20 DIAGNOSIS — E03.9 HYPOTHYROIDISM, UNSPECIFIED TYPE: ICD-10-CM

## 2024-11-21 RX ORDER — LEVOTHYROXINE SODIUM 137 UG/1
137 TABLET ORAL DAILY
Qty: 90 TABLET | Refills: 3 | Status: SHIPPED | OUTPATIENT
Start: 2024-11-21 | End: 2025-11-21

## 2024-12-09 DIAGNOSIS — M81.0 OSTEOPOROSIS WITHOUT CURRENT PATHOLOGICAL FRACTURE, UNSPECIFIED OSTEOPOROSIS TYPE: Primary | ICD-10-CM

## 2024-12-09 DIAGNOSIS — M81.0 OSTEOPOROSIS, UNSPECIFIED OSTEOPOROSIS TYPE, UNSPECIFIED PATHOLOGICAL FRACTURE PRESENCE: Primary | ICD-10-CM

## 2024-12-09 RX ORDER — DENOSUMAB 60 MG/ML
60 INJECTION SUBCUTANEOUS
Qty: 1 ML | Refills: 1 | Status: SHIPPED
Start: 2024-12-09

## 2025-01-03 DIAGNOSIS — M81.0 OSTEOPOROSIS, UNSPECIFIED OSTEOPOROSIS TYPE, UNSPECIFIED PATHOLOGICAL FRACTURE PRESENCE: Primary | ICD-10-CM

## 2025-01-06 ENCOUNTER — APPOINTMENT (OUTPATIENT)
Dept: PHYSICAL THERAPY | Facility: CLINIC | Age: OVER 89
End: 2025-01-06
Payer: MEDICARE

## 2025-01-09 ENCOUNTER — APPOINTMENT (OUTPATIENT)
Dept: INFUSION THERAPY | Facility: CLINIC | Age: OVER 89
End: 2025-01-09
Payer: MEDICARE

## 2025-01-13 ENCOUNTER — HOSPITAL ENCOUNTER (OUTPATIENT)
Dept: CARDIOLOGY | Facility: CLINIC | Age: OVER 89
Discharge: HOME | End: 2025-01-13
Payer: MEDICARE

## 2025-01-13 DIAGNOSIS — I44.30 UNSPECIFIED ATRIOVENTRICULAR BLOCK: ICD-10-CM

## 2025-01-13 DIAGNOSIS — Z95.0 PRESENCE OF CARDIAC PACEMAKER: ICD-10-CM

## 2025-01-13 PROCEDURE — 93296 REM INTERROG EVL PM/IDS: CPT

## 2025-01-21 ENCOUNTER — LAB (OUTPATIENT)
Dept: LAB | Facility: LAB | Age: OVER 89
End: 2025-01-21
Payer: MEDICARE

## 2025-01-21 ENCOUNTER — OFFICE VISIT (OUTPATIENT)
Dept: CARDIOLOGY | Facility: HOSPITAL | Age: OVER 89
End: 2025-01-21
Payer: MEDICARE

## 2025-01-21 VITALS
OXYGEN SATURATION: 97 % | DIASTOLIC BLOOD PRESSURE: 68 MMHG | SYSTOLIC BLOOD PRESSURE: 136 MMHG | HEART RATE: 82 BPM | WEIGHT: 153 LBS | HEIGHT: 61 IN | BODY MASS INDEX: 28.89 KG/M2

## 2025-01-21 DIAGNOSIS — Z95.0 CARDIAC PACEMAKER IN SITU: ICD-10-CM

## 2025-01-21 DIAGNOSIS — I25.10 CORONARY ARTERY DISEASE INVOLVING NATIVE CORONARY ARTERY OF NATIVE HEART WITHOUT ANGINA PECTORIS: Primary | ICD-10-CM

## 2025-01-21 DIAGNOSIS — M81.0 OSTEOPOROSIS, UNSPECIFIED OSTEOPOROSIS TYPE, UNSPECIFIED PATHOLOGICAL FRACTURE PRESENCE: ICD-10-CM

## 2025-01-21 DIAGNOSIS — I35.0 NONRHEUMATIC AORTIC (VALVE) STENOSIS: ICD-10-CM

## 2025-01-21 DIAGNOSIS — I10 PRIMARY HYPERTENSION: ICD-10-CM

## 2025-01-21 DIAGNOSIS — E78.5 HYPERLIPIDEMIA, UNSPECIFIED HYPERLIPIDEMIA TYPE: ICD-10-CM

## 2025-01-21 DIAGNOSIS — Z95.2 S/P TAVR (TRANSCATHETER AORTIC VALVE REPLACEMENT): ICD-10-CM

## 2025-01-21 LAB
ALBUMIN SERPL BCP-MCNC: 3.9 G/DL (ref 3.4–5)
ALP SERPL-CCNC: 93 U/L (ref 33–136)
ALT SERPL W P-5'-P-CCNC: 14 U/L (ref 7–45)
ANION GAP SERPL CALC-SCNC: 11 MMOL/L (ref 10–20)
AST SERPL W P-5'-P-CCNC: 22 U/L (ref 9–39)
ATRIAL RATE: 81 BPM
BILIRUB SERPL-MCNC: 0.4 MG/DL (ref 0–1.2)
BUN SERPL-MCNC: 35 MG/DL (ref 6–23)
CALCIUM SERPL-MCNC: 10.7 MG/DL (ref 8.6–10.3)
CHLORIDE SERPL-SCNC: 106 MMOL/L (ref 98–107)
CHOLEST SERPL-MCNC: 161 MG/DL (ref 0–199)
CHOLESTEROL/HDL RATIO: 2.3
CO2 SERPL-SCNC: 29 MMOL/L (ref 21–32)
CREAT SERPL-MCNC: 1.46 MG/DL (ref 0.5–1.05)
EGFRCR SERPLBLD CKD-EPI 2021: 34 ML/MIN/1.73M*2
GLUCOSE SERPL-MCNC: 97 MG/DL (ref 74–99)
HDLC SERPL-MCNC: 71.2 MG/DL
LDLC SERPL CALC-MCNC: 70 MG/DL
NON HDL CHOLESTEROL: 90 MG/DL (ref 0–149)
POTASSIUM SERPL-SCNC: 4.9 MMOL/L (ref 3.5–5.3)
PROT SERPL-MCNC: 5.9 G/DL (ref 6.4–8.2)
Q ONSET: 226 MS
QRS COUNT: 14 BEATS
QRS DURATION: 130 MS
QT INTERVAL: 394 MS
QTC CALCULATION(BAZETT): 460 MS
QTC FREDERICIA: 437 MS
R AXIS: -33 DEGREES
SODIUM SERPL-SCNC: 141 MMOL/L (ref 136–145)
T AXIS: 5 DEGREES
T OFFSET: 423 MS
TRIGL SERPL-MCNC: 100 MG/DL (ref 0–149)
VENTRICULAR RATE: 82 BPM
VLDL: 20 MG/DL (ref 0–40)

## 2025-01-21 PROCEDURE — 93005 ELECTROCARDIOGRAM TRACING: CPT | Performed by: INTERNAL MEDICINE

## 2025-01-21 PROCEDURE — 3078F DIAST BP <80 MM HG: CPT | Performed by: INTERNAL MEDICINE

## 2025-01-21 PROCEDURE — 1159F MED LIST DOCD IN RCRD: CPT | Performed by: INTERNAL MEDICINE

## 2025-01-21 PROCEDURE — 1036F TOBACCO NON-USER: CPT | Performed by: INTERNAL MEDICINE

## 2025-01-21 PROCEDURE — 99214 OFFICE O/P EST MOD 30 MIN: CPT | Performed by: INTERNAL MEDICINE

## 2025-01-21 PROCEDURE — 80053 COMPREHEN METABOLIC PANEL: CPT

## 2025-01-21 PROCEDURE — 80061 LIPID PANEL: CPT

## 2025-01-21 PROCEDURE — G2211 COMPLEX E/M VISIT ADD ON: HCPCS | Performed by: INTERNAL MEDICINE

## 2025-01-21 PROCEDURE — 3075F SYST BP GE 130 - 139MM HG: CPT | Performed by: INTERNAL MEDICINE

## 2025-01-21 PROCEDURE — 1160F RVW MEDS BY RX/DR IN RCRD: CPT | Performed by: INTERNAL MEDICINE

## 2025-01-21 RX ORDER — ATORVASTATIN CALCIUM 80 MG/1
80 TABLET, FILM COATED ORAL DAILY
Qty: 90 TABLET | Refills: 3 | Status: SHIPPED | OUTPATIENT
Start: 2025-01-21 | End: 2026-01-21

## 2025-01-21 NOTE — PROGRESS NOTES
Primary Care Physician: Bashir Smith MD  Date of Visit: 01/21/2025 11:40 AM EST  Location of visit: [facility]     Chief Complaint:   Chief Complaint   Patient presents with    Follow-up        HPI / Summary:   Josee Henson is a 89 y.o. female presents for follow-up.  She has no cardiac complaints.  She exercises with a  2 days a week and goes to [employer] without chest pain or shortness of breath.  The patient denies chest pain, shortness of breath, palpitations, lightheadedness, syncope, orthopnea, paroxysmal nocturnal dyspnea, lower extremity edema, or bleeding problems.          Past Medical History:   Diagnosis Date    Aortic stenosis     Hyperlipidemia     Hypertension         Past Surgical History:   Procedure Laterality Date    CARDIAC CATHETERIZATION N/A 2/2/2024    Procedure: Left And Right Heart Cath, With LV;  Surgeon: Pérez Traylor MD;  Location: [facility] Cardiac Cath Lab;  Service: Cardiovascular;  Laterality: N/A;  Hydration needed @ 8:30 AM    CARDIAC CATHETERIZATION N/A 2/23/2024    Procedure: TAVR (Transcatheter AV Replacement);  Surgeon: Braydon Correa MD;  Location: [place] [address] Cardiac Cath Lab;  Service: Cardiovascular;  Laterality: N/A;  Medtronic, Evolut FX 26    CARDIAC CATHETERIZATION N/A 2/23/2024    Procedure: TVP for TAVR;  Surgeon: Braydon Correa MD;  Location: [place] [address] Cardiac Cath Lab;  Service: Cardiovascular;  Laterality: N/A;    CARDIAC CATHETERIZATION N/A 2/23/2024    Procedure: TAVR (Transcatheter AV Replacement);  Surgeon: Brandi Serrano MD;  Location: [place] [address] Cardiac Cath Lab;  Service: Cardiac Surgery;  Laterality: N/A;    CARDIAC ELECTROPHYSIOLOGY PROCEDURE Left 2/26/2024    Procedure: PPM IMPLANT DUAL;  Surgeon: Marie Bishop MD;  Location: [place] [address] Cardiac Cath Lab;  Service: Electrophysiology;  Laterality: Left;  [employer] company will be determined by either Dr. Douglas or Dr. Bishop.    COLONOSCOPY  04/15/2014     Complete Colonoscopy    OTHER SURGICAL HISTORY  04/15/2014    Revision Of Total Knee Arthroplasty    TOTAL KNEE ARTHROPLASTY  04/15/2014    Total Knee Arthroplasty          Social History:   reports that she has never smoked. She has never used smokeless tobacco. She reports current alcohol use of about 1.0 standard drink of alcohol per week. She reports that she does not use drugs.     Family History:  family history includes Alzheimer's disease (age of onset: 83) in her mother; Diabetes (age of onset: 69) in her father; Heart attack in her father; Leukemia in her father; ovarian mass in her mother.      Allergies:  Allergies   Allergen Reactions    Tetracycline Rash and Unknown       Outpatient Medications:  Current Outpatient Medications   Medication Instructions    acetaminophen (TYLENOL EXTRA STRENGTH) 1,000 mg, Every 8 hours PRN    amoxicillin (Amoxil) 500 mg capsule TAKE 4 CAPSULES BY MOUTH 1 HOUR BEFORE PROCEDURE    aspirin (Ecotrin Low Strength) 81 mg EC tablet 1 tablet, Nightly    atorvastatin (LIPITOR) 40 mg, oral, Daily    biotin 5 mg capsule 1 capsule, Daily    calcium carbonate-vitamin D3 (Oscal-500) 500 mg-10 mcg (400 unit) tablet 2 tablets, Nightly    cyclobenzaprine (FLEXERIL) 5 mg, oral, 3 times daily PRN    fexofenadine (ALLEGRA ALLERGY) 180 mg, Daily    flaxseed oiL 1,000 mg capsule 1 capsule, 2 times daily    fluticasone (Flonase) 50 mcg/actuation nasal spray 2 sprays, Daily    levothyroxine (SYNTHROID, LEVOXYL) 137 mcg, oral, Daily    multivitamin with minerals tablet 1 tablet, Daily    omeprazole (PriLOSEC) 20 mg DR capsule 1 capsule, Daily    Prolia 60 mg, subcutaneous, Every 6 months    quiNINE (Qualaquin) 324 mg capsule 1 capsule, Nightly    valsartan (DIOVAN) 40 mg, oral, 2 times daily    zolpidem (AMBIEN) 5 mg, oral, Nightly PRN       Physical Exam:  Vitals:    01/21/25 1152   BP: 136/68   BP Location: Left arm   Patient Position: Sitting   BP Cuff Size: Adult   Pulse: 82   SpO2: 97%  "  Weight: 69.4 kg (153 lb)   Height: 1.549 m (5' 1\")     Wt Readings from Last 5 Encounters:   01/21/25 69.4 kg (153 lb)   11/15/24 71.2 kg (157 lb)   10/01/24 71.2 kg (157 lb)   07/29/24 71.2 kg (157 lb)   07/11/24 72.6 kg (160 lb)     Body mass index is 28.91 kg/m².   General: Well-developed well-nourished in no acute distress  HEENT: Normocephalic atraumatic  Neck: Supple, JVP is normal negative hepatojugular reflux 2+ carotid pulses without bruit  Pulmonary: Normal respiratory effort, clear to auscultation  Cardiovascular: No right ventricular heave, normal S1 and S2, 1 out of 6 early peaking systolic ejection murmur at the base no rubs or gallops  Abdomen: Soft nontender nondistended  Extremities: Warm without edema 2+ radial pulses bilaterally   Neurologic: Alert and oriented x3  Psychiatric: Normal mood and affect     Last Labs:  CMP:  Recent Labs     11/09/24  1045 09/04/24  1512 07/29/24  1723    140 136   K 4.3 4.7 5.3    103 104   CO2 24 29 27   ANIONGAP 14 13 10   BUN 30* 32* 31*   CREATININE 1.67* 1.67* 1.69*   EGFR 29* 29* 29*   GLUCOSE 115* 102* 93     Recent Labs     11/09/24  1045 09/04/24  1512 07/29/24  1723   ALBUMIN 4.1 4.2 3.8   ALKPHOS 88 113 93   ALT 14 14 24   AST 20 21 60*   BILITOT 0.5 0.4 0.5     CBC:  Recent Labs     11/09/24  1045 07/29/24  2051 07/11/24  1212   WBC 6.7 8.4 6.3   HGB 12.0 11.8* 11.9*   HCT 37.9 37.5 38.0    176 204   MCV 90 87 88     COAG:   Recent Labs     02/26/24  0446 02/23/24  1615 11/24/21  0423 12/16/20  0932   INR 1.0 1.3*   < > 1.0   DDIMERVTE  --   --   --  1,148*    < > = values in this interval not displayed.     HEME/ENDO:  Recent Labs     11/09/24  1045 09/04/24  1512 06/11/24  1224 10/26/23  0942 08/18/20  1302 06/16/20  1253   TSH 5.01* 4.56* 6.37* 5.63*   < > 0.05*   HGBA1C 5.9*  --   --  6.0*  --  6.4    < > = values in this interval not displayed.      CARDIAC: No results for input(s): \"LDH\", \"CKMB\", \"TROPHS\", \"BNP\" in the last " "32467 hours.    No lab exists for component: \"CK\", \"CKMBP\"  Recent Labs     11/09/24  1045 03/25/24  0942 10/26/23  0942 10/27/22  0955 10/14/21  1050 10/22/20  0936   CHOL 190 179 199 173 202* 197   LDLF  --   --   --  81 97 85   LDLCALC 87 67 96  --   --   --    HDL 81.7 82.6 75.8 69.5 80.6 88.0   TRIG 106 146 134 115 120 118       Last Cardiology Tests:  ECG:  An electrocardiogram performed today that I reviewed shows atrial pacing right bundle branch block left axis deviation.      Echo:  Echocardiogram March 18, 2024  CONCLUSIONS:   1. Left ventricular systolic function is normal with a 65-70% estimated ejection fraction.   2. Spectral Doppler shows an impaired relaxation pattern of left ventricular diastolic filling.   3. Mild mitral valve regurgitation.   4. Slightly elevated RVSP.   5. S/p 29mm Medtronic Evolut TAVR with gradients of 13.4/5.8mmHg with mild perivalvular AI.   6. There is a transcatheter aortic valve replacement.   7. Mild aortic valve regurgitation.   8. Compared with the prior exam from 2/24/2024 the prior TAVR gradients were 16.5/8mmg wtih trivial AI at that time. The AI was better seen today. Still with preserved LV systolic function.    Echocardiogram February 24, 2024  CONCLUSIONS:   1. Left ventricular systolic function is hyperdynamic with a 70-75% estimated ejection fraction.   2. Aortic valve appears abnormal.   3. There is a transcatheter aortic valve replacement.      Echocardiogram November 30, 2023  CONCLUSIONS:  1. Left ventricular systolic function is normal with a 60% estimated ejection fraction.  2. Spectral Doppler shows a pseudonormal pattern of left ventricular diastolic filling.  3. Moderate to severe aortic valve stenosis. pk/mn 50/29 mmHg, DI 0.23, ADITYA 0.6 suggests low flow low gradient severe type.  4. There is moderate to severe aortic valve cusp calcification.  5. Compared with study from 11/8/2022, dimensionless index 0.23 down from 0.34 suggesting more severe " aortic stenosis. Clinical correlation suggested.        Cath:  Cardiac catheterization February 2, 2024  Coronary Lesion Summary:  Vessel            Stenosis      Vessel Segment  Left Main    10 to 30% stenosis     distal  LAD          10 to 30% stenosis    proximal  1st Diagonal    80% stenosis       proximal  Circumflex      30% stenosis        ostial  RCA          10 to 30% stenosis proximal to mid  CONCLUSIONS:   1. 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    Stress Test:  Stress Results:  No results found for this or any previous visit from the past 365 days.         Cardiac Imaging:  CT TAVR January 2024  IMPRESSION:  1. Scattered mild atherosclerotic changes involving the  thoracoabdominal aorta and its branches. The access vessels are  patent.  2. Severe calcifications of the aortic valve correlating with history  of aortic stenosis.  3. Moderate coronary artery calcification. Correlate with coronary  artery disease risk factors.  4. Small hiatal hernia.  5. Scattered colonic diverticula with no evidence of acute  diverticulitis.    Carotid ultrasound November 30, 2023  CONCLUSIONS:  Right Carotid: Findings are consistent with less than 50% stenosis of the right proximal internal carotid artery. Laminar flow seen by color Doppler. Right external carotid artery appears patent with no evidence of stenosis. The right vertebral artery is patent with antegrade flow. No evidence of hemodynamically significant stenosis in the right subclavian artery.  Left Carotid: Findings are consistent with less than 50% stenosis of the left proximal internal carotid artery. Laminar flow seen by color Doppler. Left external carotid artery appears patent with no evidence of stenosis. The left vertebral artery is patent with antegrade flow. No evidence of hemodynamically significant stenosis in the left subclavian artery.      CT angio chest for PE December 16, 2020  HEART AND VESSELS:  No discrete filling defects  within the main pulmonary artery or its  branches.     Main pulmonary artery is mildly dilated at 3.4 cm, chronic pulmonary  hypertension cannot be excluded.     No aortic aneurysm or dissection.     The study is not optimized for evaluation of coronary arteries.     The cardiac chambers are not enlarged.     No evidence of pericardial effusion.      Assessment/Plan   Diagnoses and all orders for this visit:  Coronary artery disease involving native coronary artery of native heart without angina pectoris  -     ECG 12 lead (Clinic Performed)  Primary hypertension  S/P TAVR (transcatheter aortic valve replacement)  Nonrheumatic aortic (valve) stenosis  Hyperlipidemia, unspecified hyperlipidemia type  -     atorvastatin (Lipitor) 80 mg tablet; Take 1 tablet (80 mg) by mouth once daily.  -     Lipid panel; Future  Cardiac pacemaker in situ    In summary Ms. Henson is a pleasant 89-year-old white female with a past medical history significant for aortic stenosis status post TAVR complicated by complete heart block status post pacemaker placement, severe branch vessel CAD on angiography with preserved LV function, hypertension, hyperlipidemia, chronic kidney disease, glucose intolerance, and obesity.  She is asymptomatic from a cardiac perspective.  Her blood pressure is mildly elevated today.  I asked her to monitor her blood pressure at home.  Her most recent lipid profile was not at goal.  I increased her atorvastatin to 80 mg daily.  We will repeat a lipid profile in 2 to 3 months.  She should continue her other cardiovascular medications.  We will see her back in follow-up in 6 months.        Orders:  Orders Placed This Encounter   Procedures    Lipid panel    ECG 12 lead (Clinic Performed)      Followup Appts:  Future Appointments   Date Time Provider Department Center   2/21/2025  8:00 AM  [facility] [id number] CARD1 [id number] Academic           ____________________________________________________________  Pérez M  MD [place]  [facility] [place]  [facility]

## 2025-01-21 NOTE — PATIENT INSTRUCTIONS
Increase atorvastatin to 80 mg daily.  Check a fasting lipid profile in 2 to 3 months.  Follow-up with the structural heart team.  Monitor your blood pressure at home.  Follow-up in 6 months.

## 2025-02-14 ENCOUNTER — APPOINTMENT (OUTPATIENT)
Dept: CARDIOLOGY | Facility: HOSPITAL | Age: OVER 89
End: 2025-02-14
Payer: MEDICARE

## 2025-02-21 ENCOUNTER — TELEMEDICINE (OUTPATIENT)
Dept: CARDIOLOGY | Facility: HOSPITAL | Age: OVER 89
End: 2025-02-21
Payer: MEDICARE

## 2025-02-21 DIAGNOSIS — Z95.2 S/P TAVR (TRANSCATHETER AORTIC VALVE REPLACEMENT): Primary | ICD-10-CM

## 2025-02-21 PROCEDURE — 1159F MED LIST DOCD IN RCRD: CPT | Performed by: NURSE PRACTITIONER

## 2025-02-21 PROCEDURE — 1160F RVW MEDS BY RX/DR IN RCRD: CPT | Performed by: NURSE PRACTITIONER

## 2025-02-21 PROCEDURE — 1036F TOBACCO NON-USER: CPT | Performed by: NURSE PRACTITIONER

## 2025-02-21 PROCEDURE — 99214 OFFICE O/P EST MOD 30 MIN: CPT | Performed by: NURSE PRACTITIONER

## 2025-02-21 NOTE — PROGRESS NOTES
Structural Heart Follow up visit    Josee Henson is a 89 y.o. female presents today for 1 year follow up s/p TAVR      denies SOB,MEDELLIN, fatigue  Recent Hospitalizations  No    patient with   Past Medical History:   Diagnosis Date    Aortic stenosis     Hyperlipidemia     Hypertension        No results found. However, due to the size of the patient record, not all encounters were searched. Please check Results Review for a complete set of results.     Transthoracic echo (TTE) complete    Result Date: 3/18/2024    [facility], [address] [place] [address]                      Tel [phone] and Fax [phone] TRANSTHORACIC ECHOCARDIOGRAM REPORT  Patient Name:      JOSEE HENSON     Reading Physician:    [id number] Mritha Pettit MD Study Date:        3/18/2024            Ordering Provider:    [id number] LOPEZ JUAREZ MRN/PID:           [id number]             Fellow: Accession#:        [id number]         Nurse: Date of Birth/Age: 1935 / 89 years Sonographer:          Uzma Chou RDCS Gender:            F                    Additional Staff: Height:            154.94 cm            Admit Date: Weight:            76.66 kg             Admission Status:     Outpatient BSA / BMI:         1.76 m2 / 31.93      Encounter#:           [id number]                    kg/m2                                         Department Location:  [facility]                                                               Echo Lab Blood Pressure: 140 /62 mmHg Study Type:    TRANSTHORACIC ECHO (TTE) COMPLETE Diagnosis/ICD: Presence of prosthetic heart valve-Z95.2 Indication:    S/P TAVR CPT Code:      Echo Complete w Full Doppler-03067 Patient History: Pacer/Defib:       Permanent pacemaker Pertinent History: HTN and Hyperlipidemia. S/P  TAVR 26mm Evolut on 2/23/24. Study Detail: The following Echo studies were performed: 2D, M-Mode, Doppler and               color flow. Technically challenging study due to body habitus.  PHYSICIAN INTERPRETATION: Left Ventricle: The left ventricular systolic function is normal, with an estimated ejection fraction of 65-70%. There are no regional wall motion abnormalities. The left ventricular cavity size is normal. Spectral Doppler shows an impaired relaxation pattern of left ventricular diastolic filling. Left Atrium: The left atrium is mildly dilated. Right Ventricle: The right ventricle is normal in size. There is normal right ventricular global systolic function. A device is visualized in the right ventricle. Right Atrium: The right atrium is normal in size. There is a device visualized in the right atrium. Aortic Valve: There is a prosthetic aortic valve present. There is a Medtronic transcatheter aortic valve replacement, with a 29 mm reported size. There is mild aortic valve regurgitation. The peak instantaneous gradient of the aortic valve is 13.4 mmHg. The mean gradient of the aortic valve is 5.8 mmHg. S/p 29mm Medtronic Evolut TAVR with gradients of 13.4/5.8mmHg with mild perivalvular AI. Mitral Valve: The mitral valve is normal in structure. There is mild mitral valve regurgitation which is centrally directed. Tricuspid Valve: The tricuspid valve is structurally normal. There is mild tricuspid regurgitation. The Doppler estimated RVSP is slightly elevated at 35.4 mmHg. Pulmonic Valve: The pulmonic valve is not well visualized. The pulmonic valve regurgitation was not well visualized. Pericardium: There is no pericardial effusion noted. Aorta: The aortic root is normal. Systemic Veins: The inferior vena cava appears to be of normal size. There is IVC inspiratory collapse greater than 50%. In comparison to the previous echocardiogram(s): Compared with the prior exam from 2/24/2024 the prior TAVR gradients  were 16.5/8mmg wtih trivial AI at that time. The AI was better seen today. Still with preserved LV systolic function.  CONCLUSIONS:  1. Left ventricular systolic function is normal with a 65-70% estimated ejection fraction.  2. Spectral Doppler shows an impaired relaxation pattern of left ventricular diastolic filling.  3. Mild mitral valve regurgitation.  4. Slightly elevated RVSP.  5. S/p 29mm Medtronic Evolut TAVR with gradients of 13.4/5.8mmHg with mild perivalvular AI.  6. There is a transcatheter aortic valve replacement.  7. Mild aortic valve regurgitation.  8. Compared with the prior exam from 2/24/2024 the prior TAVR gradients were 16.5/8mmg wtih trivial AI at that time. The AI was better seen today. Still with preserved LV systolic function. QUANTITATIVE DATA SUMMARY: 2D MEASUREMENTS:                          Normal Ranges: LAs:           5.22 cm   (2.7-4.0cm) RVIDd:         2.52 cm   (0.9-3.6cm) IVSd:          0.89 cm   (0.6-1.1cm) LVPWd:         0.80 cm   (0.6-1.1cm) LVIDd:         4.71 cm   (3.9-5.9cm) LVIDs:         3.53 cm LV Mass Index: 75.0 g/m2 LV % FS        25.0 % LA VOLUME:                              Normal Ranges: LA Vol A4C:       67.3 ml    (22+/-6mL/m2) LA Vol A2C:       63.4 ml LA Vol BP:        65.7 ml LA Vol Index A4C: 38.2 ml/m2 LA Vol Index A2C: 36.0 ml/m2 LA Vol Index BP:  37.4 ml/m2 LA Volume Index:  37.3 ml/m2 LA Vol A4C:       61.1 ml LA Vol A2C:       59.4 ml RA VOLUME BY A/L METHOD:                       Normal Ranges: RA Area A4C: 17.0 cm2 AORTA MEASUREMENTS:                    Normal Ranges: Asc Ao, d: 3.30 cm (2.1-3.4cm) LV SYSTOLIC FUNCTION BY 2D PLANIMETRY (MOD):                     Normal Ranges: EF-A4C View: 59.2 % (>=55%) EF-A2C View: 70.6 % EF-Biplane:  66.3 % LV DIASTOLIC FUNCTION:                               Normal Ranges: MV Peak E:        0.58 m/s    (0.7-1.2 m/s) MV Peak A:        0.79 m/s    (0.42-0.7 m/s) E/A Ratio:        0.73        (1.0-2.2) MV e'              0.06 m/s    (>8.0) MV A Dur:         155.71 msec E/e' Ratio:       9.63        (<8.0) a'                0.08 m/s PulmV Sys Biju:    33.06 cm/s PulmV Galan Biju:   36.86 cm/s PulmV S/D Biju:    0.90 PulmV A Revs Biju: 21.92 cm/s PulmV A Revs Dur: 162.63 msec MITRAL VALVE:                 Normal Ranges: MV DT: 302 msec (150-240msec) AORTIC VALVE:                                    Normal Ranges: AoV Vmax:                1.83 m/s  (<=1.7m/s) AoV Peak P.4 mmHg (<20mmHg) AoV Mean P.8 mmHg  (1.7-11.5mmHg) LVOT Max Biju:            0.87 m/s  (<=1.1m/s) AoV VTI:                 30.50 cm  (18-25cm) LVOT VTI:                18.85 cm LVOT Diameter:           1.65 cm   (1.8-2.4cm) AoV Area, VTI:           1.31 cm2  (2.5-5.5cm2) AoV Area,Vmax:           1.01 cm2  (2.5-4.5cm2) AoV Dimensionless Index: 0.62  RIGHT VENTRICLE: RV Basal 3.30 cm RV Mid   2.50 cm RV Major 7.3 cm RV s'    0.11 m/s TRICUSPID VALVE/RVSP:                             Normal Ranges: Peak TR Velocity: 2.85 m/s RV Syst Pressure: 35.4 mmHg (< 30mmHg) IVC Diam:         1.20 cm PULMONIC VALVE:                      Normal Ranges: PV Max Biju: 1.4 m/s  (0.6-0.9m/s) PV Max P.6 mmHg Pulmonary Veins: PulmV A Revs Dur: 162.63 msec PulmV A Revs Biju: 21.92 cm/s PulmV Galan Biju:   36.86 cm/s PulmV S/D Biju:    0.90 PulmV Sys Biju:    33.06 cm/s AORTA: Asc Ao Diam 3.25 cm  [id number] Mirtha Pettit MD Electronically signed on 3/18/2024 at 3:31:42 PM  ** Final **     Transthoracic Echo (TTE) Limited    Result Date: 2024   [facility], [address] [place] [address]                Tel [phone] and Fax [phone] TRANSTHORACIC ECHOCARDIOGRAM REPORT  Patient Name:      JAIRO Chavira Physician:    [id number] Ortiz Napoles MD Study Date:        2024            Ordering Provider:    [id number] ASHLEE RYAN                                                                CARLEY MRN/PID:           [id number]             Fellow: Accession#:        [id number]         Nurse: Date of Birth/Age: 1935 / 89 years Sonographer:          Gray Lei RDCS Gender:            F                    Additional Staff: Height:            152.40 cm            Admit Date:           2/23/2024 Weight:            72.58 kg             Admission Status:     Inpatient -                                                               Routine BSA / BMI:         1.70 m2 / 31.25      Encounter#:           [id number]                    kg/m2                                         Department Location:  [facility] Blood Pressure: 133 /51 mmHg Study Type:    TRANSTHORACIC ECHO (TTE) LIMITED Diagnosis/ICD: Presence of prosthetic heart valve-Z95.2 Indication:    S/P TAVR CPT Code:      Echo Limited-23780; Doppler Limited-00090; Color Doppler-49551 Patient History: Pertinent         HTN; HLD; murmur; CKD; AS s/p TAVR (26mm Evolut FX, History:          done:2/23/24). Study Detail: The following Echo studies were performed: 2D, M-Mode, Doppler and               color flow. Technically challenging study due to body habitus and               poor acoustic windows. Definity used as a contrast agent for               endocardial border definition. Total contrast used for this               procedure was 2.0 mL via IV push.  PHYSICIAN INTERPRETATION: Left Ventricle: The left ventricular systolic function is hyperdynamic, with an estimated ejection fraction of 70-75%. There are no regional wall motion abnormalities. The left ventricular cavity size is normal. Left ventricular diastolic filling was not assessed. Left Atrium: The left atrium is normal in size. Right Ventricle: The right ventricle is normal in size. There is normal right ventricular global systolic function. Right Atrium: The right atrium was not well visualized. Aortic Valve:  The aortic valve appears abnormal. There is a Medtronic transcatheter aortic valve replacement, with a 29 mm reported size. Echo findings are consistent with normal aortic valve prosthesis structure and function. There is no evidence of aortic valve regurgitation. The peak instantaneous gradient of the aortic valve is 16.5 mmHg. The mean gradient of the aortic valve is 8.0 mmHg. Mitral Valve: The mitral valve is normal in structure. There is no evidence of mitral valve regurgitation. Tricuspid Valve: The tricuspid valve was not well visualized. Tricuspid regurgitation was not assessed. Pulmonic Valve: The pulmonic valve is not well visualized. There is physiologic pulmonic valve regurgitation. Pericardium: There is a trivial pericardial effusion. Aorta: The aortic root is normal.  CONCLUSIONS:  1. Left ventricular systolic function is hyperdynamic with a 70-75% estimated ejection fraction.  2. Aortic valve appears abnormal.  3. There is a transcatheter aortic valve replacement. QUANTITATIVE DATA SUMMARY: 2D MEASUREMENTS:                           Normal Ranges: Ao Root d:     3.10 cm    (2.0-3.7cm) LAs:           4.40 cm    (2.7-4.0cm) IVSd:          1.20 cm    (0.6-1.1cm) LVPWd:         1.20 cm    (0.6-1.1cm) LVIDd:         5.00 cm    (3.9-5.9cm) LVIDs:         3.10 cm LV Mass Index: 137.7 g/m2 LV % FS        38.0 % LA VOLUME:                             Normal Ranges: LA Volume Index: 18.8 ml/m2 LV SYSTOLIC FUNCTION BY 2D PLANIMETRY (MOD):                     Normal Ranges: EF-A4C View: 77.8 % (>=55%) EF-A2C View: 72.2 % EF-Biplane:  75.5 % LV DIASTOLIC FUNCTION:                              Normal Ranges: MV Peak E:        0.56 m/s   (0.7-1.2 m/s) MV Peak A:        0.78 m/s   (0.42-0.7 m/s) E/A Ratio:        0.72       (1.0-2.2) MV e'             0.06 m/s   (>8.0) E/e' Ratio:       9.55       (<8.0) MV DT:            275 msec   (150-240 msec) PulmV Sys Biju:    48.00 cm/s PulmV Galan Biju:   47.10 cm/s PulmV S/D  Biju:    1.00 PulmV A Revs Biju: 28.30 cm/s MITRAL VALVE:                 Normal Ranges: MV DT: 275 msec (150-240msec) AORTIC VALVE:                                    Normal Ranges: AoV Vmax:                2.03 m/s  (<=1.7m/s) AoV Peak P.5 mmHg (<20mmHg) AoV Mean P.0 mmHg  (1.7-11.5mmHg) LVOT Max Biju:            1.29 m/s  (<=1.1m/s) AoV VTI:                 35.30 cm  (18-25cm) LVOT VTI:                25.10 cm LVOT Diameter:           1.80 cm   (1.8-2.4cm) AoV Area, VTI:           1.81 cm2  (2.5-5.5cm2) AoV Area,Vmax:           1.62 cm2  (2.5-4.5cm2) AoV Dimensionless Index: 0.71  RIGHT VENTRICLE: TAPSE: 18.2 mm RV s'  0.16 m/s Pulmonary Veins: PulmV A Revs Biju: 28.30 cm/s PulmV Galan Biju:   47.10 cm/s PulmV S/D Biju:    1.00 PulmV Sys Biju:    48.00 cm/s  [id number] Ortiz Napoles MD Electronically signed on 2024 at 1:31:50 PM  ** Final **     Transthoracic Echo (TTE) Limited    Result Date: 2024   [facility], [address] [place] [address]                Tel [phone] and Fax [phone] TRANSTHORACIC ECHOCARDIOGRAM REPORT  Patient Name:      JAIRO Chavira Physician:    [id number] Moise Jimenez MD Study Date:        2024            Ordering Provider:    [id number] LOPEZ JUAREZ MRN/PID:           [id number]             Fellow: Accession#:        [id number]         Nurse: Date of Birth/Age: 1935 / 89 years Sonographer:          BIBIANA Stone RDCS Gender:            F                    Additional Staff: Height:            154.94 cm            Admit Date:           2024 Weight:            72.58 kg             Admission Status:     Inpatient -                                                               Routine BSA / BMI:         1.72 m2 / 30.23       Encounter#:           [id number]                    kg/m2                                         Department Location:  [facility]                                                               Cath Lab Blood Pressure: 141 /63 mmHg Study Type:    TRANSTHORACIC ECHO (TTE) LIMITED Diagnosis/ICD: Nonrheumatic aortic (valve) stenosis-I35.0 Indication:    TAVR Periprocedure CPT Code:      Echo Limited-36071; Doppler Limited-75188; Color Doppler-64418 Patient History: Pertinent History: HTN, Hyperlipidemia and Murmur. CKD, AS. Study Detail: The following Echo studies were performed: 2D, M-Mode, Doppler and               color flow. Technically challenging study due to patient lying in               supine position and body habitus.  PHYSICIAN INTERPRETATION: Left Ventricle: The left ventricular systolic function is normal, with an estimated ejection fraction of 70%. There are no regional wall motion abnormalities. The left ventricular cavity size is normal. Spectral Doppler shows a normal pattern of left ventricular diastolic filling. There is concentric LVH. Left Atrium: The left atrium is normal in size. Right Ventricle: The right ventricle is normal in size. There is normal right ventricular global systolic function. Right Atrium: The right atrium is normal in size. Aortic Valve: The aortic valve is probably trileaflet. There is moderate to severe aortic valve cusp calcification. The aortic valve dimensionless index is 0.32. There is no evidence of aortic valve regurgitation. The peak instantaneous gradient of the aortic valve is 39.4 mmHg. The mean gradient of the aortic valve is 22.0 mmHg. Mitral Valve: The mitral valve is normal in structure. There is mild to moderate mitral valve regurgitation. Tricuspid Valve: The tricuspid valve is structurally normal. There is trace to mild tricuspid regurgitation. The Doppler estimated RVSP is mildly elevated at 35.5 mmHg. Pulmonic Valve: The pulmonic valve is not well  visualized. The pulmonic valve regurgitation was not well visualized. Pericardium: There is no pericardial effusion noted. Aorta: The aortic root is normal.  Post Transcatheter Aortic Valve Placement (TAVR): The peak instantaneous gradient of the aortic valve is 12.4 mmHg. The mean gradient of the aortic valve is 5.0 mmHg. There is a Medtronic transcatheter aortic valve replacement, with a 26 mm reported size. There is no mckenna-prosthetic aortic valve regurgitation.  CONCLUSIONS:  1. Left ventricular systolic function is normal with a 70% estimated ejection fraction.  2. Poorly visualized anatomical structures due to suboptimal image quality.  3. Mild to moderate mitral valve regurgitation.  4. Mildly elevated RVSP.  5. There is moderate to severe aortic valve cusp calcification. QUANTITATIVE DATA SUMMARY: 2D MEASUREMENTS:                          Normal Ranges: Ao Root d:     2.80 cm   (2.0-3.7cm) LAs:           4.00 cm   (2.7-4.0cm) IVSd:          0.80 cm   (0.6-1.1cm) LVPWd:         0.80 cm   (0.6-1.1cm) LVIDd:         4.90 cm   (3.9-5.9cm) LVIDs:         3.10 cm LV Mass Index: 76.4 g/m2 LV % FS        36.7 % LA VOLUME:                               Normal Ranges: LA Vol A4C:        55.8 ml    (22+/-6mL/m2) LA Vol A2C:        46.8 ml LA Vol BP:         51.2 ml LA Vol Index A4C:  32.5ml/m2 LA Vol Index A2C:  27.2 ml/m2 LA Vol Index BP:   29.8 ml/m2 LA Area A4C:       18.7 cm2 LA Area A2C:       17.1 cm2 LA Major Axis A4C: 5.3 cm LA Major Axis A2C: 5.3 cm LA Volume Index:   29.8 ml/m2 AORTA MEASUREMENTS:                    Normal Ranges: Asc Ao, d: 2.80 cm (2.1-3.4cm) LV SYSTOLIC FUNCTION BY 2D PLANIMETRY (MOD):                     Normal Ranges: EF-A4C View: 78.6 % (>=55%) EF-A2C View: 69.6 % EF-Biplane:  75.1 % LV DIASTOLIC FUNCTION:                           Normal Ranges: MV Peak E:    0.61 m/s    (0.7-1.2 m/s) MV Peak A:    0.56 m/s    (0.42-0.7 m/s) E/A Ratio:    1.09        (1.0-2.2) MV e'         0.10 m/s     (>8.0) MV lateral e' 0.12 m/s MV medial e'  0.07 m/s MV A Dur:     129.00 msec E/e' Ratio:   6.45        (<8.0) MV DT:        269 msec    (150-240 msec) MITRAL VALVE:                 Normal Ranges: MV DT: 269 msec (150-240msec) AORTIC VALVE:                                              Normal Ranges: AoV Vmax:                          3.14 m/s  (<=1.7m/s) AoV Vmax Post TAVR:                1.76 m/s  (<=1.7m/s) AoV Peak P.4 mmHg (<20mmHg) AoV Peak PG Post TAVR:             12.4 mmHg (<20mmHg) AoV Mean P.0 mmHg (1.7-11.5mmHg) AoV Mean PG Post TAVR:             5.0 mmHg  (1.7-11.5mmHg) LVOT Max Biju:                      1.14 m/s  (<=1.1m/s) LVOT Max Biju Post TAVR:            1.14 m/s  (<=1.1m/s) AoV VTI:                           73.40 cm  (18-25cm) AoV VTI Post TAVR:                 34.40 cm  (18-25cm) LVOT VTI:                          23.80 cm LVOT VTI Post TAVR:                23.80 cm LVOT Diameter:                     1.80 cm   (1.8-2.4cm) LVOT Diameter Post TAVR:           1.80 cm   (1.8-2.4cm) AoV Area, VTI:                     0.83 cm2  (2.5-5.5cm2) AoV Area, VTI Post TAVR:           1.76 cm2  (2.5-5.5cm2) AoV Area,Vmax:                     0.92 cm2  (2.5-4.5cm2) AoV Area,Vmax Post TAVR:           1.65 cm2  (2.5-4.5cm2) AoV Dimensionless Index:           0.32 AoV Dimensionless Index Post TAVR: 0.69  RIGHT VENTRICLE: TAPSE: 19.3 mm RV s'  0.11 m/s TRICUSPID VALVE/RVSP:                             Normal Ranges: Peak TR Velocity: 2.85 m/s RV Syst Pressure: 35.5 mmHg (< 30mmHg) IVC Diam:         1.14 cm  [id number] Moise Jimenez MD Electronically signed on 2024 at 5:40:09 PM  ** Final **                  Heart Failure Follow up    NYHA class 1    Edema Denies  Dyspnea on Exertion Denies  Fatigue Improved  Exercise Intolerance Improved  Orthopnea Denies  PND Denies    Chest pain No  Syncope No  Palpitations No    All organ systems normal except:  falls               [facility]Q Questionnaire    1  Heart failure affects different people in different ways. Some feel shortness of breath while others feel fatigue. Please indicate how much you are limited by heart failure (shortness of breath or fatigue) in your ability to do the following activities over the past 2 weeks.     A.) Showering/bathing  5. Not at All  B.) Walking 1 block on level ground 5. Not at All  C.) Hurrying or Jogging   6. Limited for other reastons    2.  Over the past 2 weeks, how many times did you have swelling in your feet, ankles or legs when you woke up in the morning? 5. Never    3.  Over the past 2 weeks, on average, how many times has fatigue limited your ability to do what you wanted? 7. Never    4.  Over the past 2 weeks, on average, how many times has shortness of breath limited your ability to do what you wanted? 7. Never    5.  Over the past 2 weeks, on average, how many times have you been forced to sleep sitting up in a chair or with at least 3 pillows to prop you up because of shortness of breath? Never    6. Over the past 2 weeks, how much has your heart failure limited your enjoyment of life? It has not limited my enjoyment of life    7. If you had to spend the rest of your life with your heart failure the way it is right now, how would you feel about this? 5. Completely satisfied    8. How much does your heart failure affect your lifestyle? Please indicate how your heart failure may have limited yourparticipation in the following activities over the past 2 weeks    A.)  Hobbies, recreational activities  5. Did not limit at all    B.) Working or doing household chores  5. Did not limit at all    C.) Visiting family or friends out of your home  5. Did not limit at all      Josee Henson is now s/p TAVR Evolut FX 26mm via B/L femoral artery (Right primary) on 2/23/24 with Dr. Correa and Dr. Khanna.  Final procedure ECHO showed a mean gradient of 3, trace PVL, and no PC  effusion.  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  EP was consulted.  POD 1 ECHO showed EF 70-75%, no AI, grad 16.5/8.0, no MR, triv PC effusion.  On 2/26/24 - Dr. Bishop placed Dual Chamber PPM via left chest wall.  Presents today for 1 year follow-up.     Impression:  - 1 year s/p TAVR  - Pt appears to be euvolemic with flat neck veins and no BLE edema.  Work of breathing normal with NAD, skin tone without pallor.   - HF symptoms:  denies all  - vitals:  stable  - biggest concern at this time is her multiple falls, luckily she has not sustained any significant injuries, currently in an exercise program designed for improving balance  - Overall visually looks great, clinically doing very well    1 year ECHO - ordered, needed for TVT registry      Plan:   - ECHO ordered, will have schedulers reach out to pt, will follow/discuss results  - Cont ASA for life  - Cont current medication regimen  - Cont to increase activity  - No further follow-ups with Structural Heart  - f/u with Dr. Traylor (Primary Cards) as scheduled  - Annual ECHO recommended  - SHdental: Life-long Dental SBE prophylaxis needed - Amoxicillin on-file      Virtual Visit    Milton Marquez MSN, [facility]  Acute Care Nurse Practitioner  Structural Heart / TAVR Team  [phone] (ph)  [phone] (fax)

## 2025-02-25 ENCOUNTER — HOSPITAL ENCOUNTER (OUTPATIENT)
Dept: CARDIOLOGY | Facility: HOSPITAL | Age: OVER 89
Discharge: HOME | End: 2025-02-25
Payer: MEDICARE

## 2025-02-25 DIAGNOSIS — Z95.2 S/P TAVR (TRANSCATHETER AORTIC VALVE REPLACEMENT): ICD-10-CM

## 2025-02-25 PROCEDURE — 93306 TTE W/DOPPLER COMPLETE: CPT

## 2025-02-27 LAB
AORTIC VALVE MEAN GRADIENT: 4 MMHG
AORTIC VALVE PEAK VELOCITY: 1.59 M/S
AV PEAK GRADIENT: 10 MMHG
AVA (PEAK VEL): 1.59 CM2
AVA (VTI): 1.75 CM2
EJECTION FRACTION APICAL 4 CHAMBER: 49.2
EJECTION FRACTION: 46 %
LEFT ATRIUM VOLUME AREA LENGTH INDEX BSA: 31.9 ML/M2
LEFT VENTRICLE INTERNAL DIMENSION DIASTOLE: 4.29 CM (ref 3.5–6)
LEFT VENTRICULAR OUTFLOW TRACT DIAMETER: 1.77 CM
MITRAL VALVE E/A RATIO: 0.64
RIGHT VENTRICLE PEAK SYSTOLIC PRESSURE: 32.4 MMHG
TRICUSPID ANNULAR PLANE SYSTOLIC EXCURSION: 1.6 CM

## 2025-03-21 LAB
ATRIAL RATE: 81 BPM
Q ONSET: 226 MS
QRS COUNT: 14 BEATS
QRS DURATION: 130 MS
QT INTERVAL: 394 MS
QTC CALCULATION(BAZETT): 460 MS
QTC FREDERICIA: 437 MS
R AXIS: -33 DEGREES
T AXIS: 5 DEGREES
T OFFSET: 423 MS
VENTRICULAR RATE: 82 BPM

## 2025-04-21 ENCOUNTER — HOSPITAL ENCOUNTER (OUTPATIENT)
Dept: CARDIOLOGY | Facility: CLINIC | Age: OVER 89
Discharge: HOME | End: 2025-04-21
Payer: MEDICARE

## 2025-04-21 DIAGNOSIS — Z95.0 CARDIAC PACEMAKER IN SITU: ICD-10-CM

## 2025-04-21 DIAGNOSIS — I44.30 AVB (ATRIOVENTRICULAR BLOCK): ICD-10-CM

## 2025-04-21 PROCEDURE — 93296 REM INTERROG EVL PM/IDS: CPT

## 2025-04-21 PROCEDURE — 93294 REM INTERROG EVL PM/LDLS PM: CPT | Performed by: INTERNAL MEDICINE

## 2025-04-22 ENCOUNTER — TELEPHONE (OUTPATIENT)
Dept: PRIMARY CARE | Facility: CLINIC | Age: OVER 89
End: 2025-04-22
Payer: MEDICARE

## 2025-05-08 ENCOUNTER — DOCUMENTATION (OUTPATIENT)
Dept: OCCUPATIONAL THERAPY | Facility: HOSPITAL | Age: OVER 89
End: 2025-05-08
Payer: MEDICARE

## 2025-05-08 NOTE — PROGRESS NOTES
Occupational Therapy                 Therapy Communication Note    Patient Name: Josee Henson  MRN: [id number]  Department:   Room: Room/bed info not found  Today's Date: 5/8/2025     Discipline: Occupational Therapy       Comment: Patient discharged.

## 2025-05-12 ENCOUNTER — TELEPHONE (OUTPATIENT)
Dept: PRIMARY CARE | Facility: CLINIC | Age: OVER 89
End: 2025-05-12
Payer: MEDICARE

## 2025-05-12 NOTE — TELEPHONE ENCOUNTER
Dr. Bocanegra is moving her office again and Josee said she is not making that move with her. She wants to discuss this with you.

## 2025-05-21 ENCOUNTER — LAB (OUTPATIENT)
Dept: LAB | Facility: HOSPITAL | Age: OVER 89
End: 2025-05-21
Payer: MEDICARE

## 2025-05-21 DIAGNOSIS — M81.0 OSTEOPOROSIS, UNSPECIFIED OSTEOPOROSIS TYPE, UNSPECIFIED PATHOLOGICAL FRACTURE PRESENCE: ICD-10-CM

## 2025-05-21 DIAGNOSIS — M81.0 AGE-RELATED OSTEOPOROSIS WITHOUT CURRENT PATHOLOGICAL FRACTURE: Primary | ICD-10-CM

## 2025-05-21 LAB
ALBUMIN SERPL BCP-MCNC: 4 G/DL (ref 3.4–5)
ALP SERPL-CCNC: 108 U/L (ref 33–136)
ALT SERPL W P-5'-P-CCNC: 16 U/L (ref 7–45)
ANION GAP SERPL CALC-SCNC: 13 MMOL/L (ref 10–20)
AST SERPL W P-5'-P-CCNC: 22 U/L (ref 9–39)
BILIRUB SERPL-MCNC: 0.4 MG/DL (ref 0–1.2)
BUN SERPL-MCNC: 33 MG/DL (ref 6–23)
CALCIUM SERPL-MCNC: 10.7 MG/DL (ref 8.6–10.6)
CHLORIDE SERPL-SCNC: 105 MMOL/L (ref 98–107)
CO2 SERPL-SCNC: 27 MMOL/L (ref 21–32)
CREAT SERPL-MCNC: 1.79 MG/DL (ref 0.5–1.05)
EGFRCR SERPLBLD CKD-EPI 2021: 27 ML/MIN/1.73M*2
GLUCOSE SERPL-MCNC: 108 MG/DL (ref 74–99)
POTASSIUM SERPL-SCNC: 4.4 MMOL/L (ref 3.5–5.3)
PROT SERPL-MCNC: 6.1 G/DL (ref 6.4–8.2)
SODIUM SERPL-SCNC: 141 MMOL/L (ref 136–145)

## 2025-05-21 PROCEDURE — 36415 COLL VENOUS BLD VENIPUNCTURE: CPT

## 2025-05-21 PROCEDURE — 80053 COMPREHEN METABOLIC PANEL: CPT

## 2025-05-27 ENCOUNTER — TELEPHONE (OUTPATIENT)
Dept: CARDIOLOGY | Facility: HOSPITAL | Age: OVER 89
End: 2025-05-27
Payer: MEDICARE

## 2025-05-28 DIAGNOSIS — I10 PRIMARY HYPERTENSION: ICD-10-CM

## 2025-05-28 RX ORDER — VALSARTAN 40 MG/1
40 TABLET ORAL 2 TIMES DAILY
Qty: 180 TABLET | Refills: 3 | Status: SHIPPED | OUTPATIENT
Start: 2025-05-28 | End: 2025-06-03 | Stop reason: SDUPTHER

## 2025-05-29 DIAGNOSIS — E03.9 HYPOTHYROIDISM, UNSPECIFIED TYPE: ICD-10-CM

## 2025-05-29 RX ORDER — LEVOTHYROXINE SODIUM 137 UG/1
137 TABLET ORAL DAILY
Qty: 90 TABLET | Refills: 0 | Status: SHIPPED | OUTPATIENT
Start: 2025-05-29 | End: 2026-05-29

## 2025-05-30 ENCOUNTER — TELEPHONE (OUTPATIENT)
Dept: CARDIOLOGY | Facility: HOSPITAL | Age: OVER 89
End: 2025-05-30
Payer: MEDICARE

## 2025-05-30 DIAGNOSIS — I10 PRIMARY HYPERTENSION: ICD-10-CM

## 2025-06-03 RX ORDER — VALSARTAN 40 MG/1
40 TABLET ORAL 2 TIMES DAILY
Qty: 30 TABLET | Refills: 0 | Status: CANCELLED | OUTPATIENT
Start: 2025-06-03

## 2025-06-03 RX ORDER — VALSARTAN 40 MG/1
40 TABLET ORAL 2 TIMES DAILY
Qty: 180 TABLET | Refills: 3 | Status: SHIPPED
Start: 2025-06-03 | End: 2025-06-04 | Stop reason: SDUPTHER

## 2025-06-04 DIAGNOSIS — I10 PRIMARY HYPERTENSION: ICD-10-CM

## 2025-06-05 ENCOUNTER — APPOINTMENT (OUTPATIENT)
Dept: INFUSION THERAPY | Facility: CLINIC | Age: OVER 89
End: 2025-06-05
Payer: MEDICARE

## 2025-06-05 VITALS
OXYGEN SATURATION: 95 % | HEART RATE: 90 BPM | RESPIRATION RATE: 16 BRPM | TEMPERATURE: 97.3 F | SYSTOLIC BLOOD PRESSURE: 149 MMHG | DIASTOLIC BLOOD PRESSURE: 82 MMHG

## 2025-06-05 DIAGNOSIS — M81.0 OSTEOPOROSIS, UNSPECIFIED OSTEOPOROSIS TYPE, UNSPECIFIED PATHOLOGICAL FRACTURE PRESENCE: ICD-10-CM

## 2025-06-05 PROCEDURE — 96372 THER/PROPH/DIAG INJ SC/IM: CPT | Performed by: NURSE PRACTITIONER

## 2025-06-05 RX ORDER — EPINEPHRINE 0.3 MG/.3ML
0.3 INJECTION SUBCUTANEOUS EVERY 5 MIN PRN
OUTPATIENT
Start: 2025-07-04

## 2025-06-05 RX ORDER — VALSARTAN 40 MG/1
40 TABLET ORAL 2 TIMES DAILY
Qty: 180 TABLET | Refills: 3 | Status: SHIPPED | OUTPATIENT
Start: 2025-06-05 | End: 2026-06-05

## 2025-06-05 RX ORDER — DIPHENHYDRAMINE HYDROCHLORIDE 50 MG/ML
50 INJECTION, SOLUTION INTRAMUSCULAR; INTRAVENOUS AS NEEDED
OUTPATIENT
Start: 2025-07-04

## 2025-06-05 RX ORDER — FAMOTIDINE 10 MG/ML
20 INJECTION, SOLUTION INTRAVENOUS ONCE AS NEEDED
OUTPATIENT
Start: 2025-07-04

## 2025-06-05 RX ORDER — ALBUTEROL SULFATE 0.83 MG/ML
3 SOLUTION RESPIRATORY (INHALATION) AS NEEDED
OUTPATIENT
Start: 2025-07-04

## 2025-06-05 ASSESSMENT — ENCOUNTER SYMPTOMS
LIGHT-HEADEDNESS: 0
PALPITATIONS: 0
EXTREMITY WEAKNESS: 0
WHEEZING: 0
NUMBNESS: 0
SHORTNESS OF BREATH: 0
LEG SWELLING: 0
WOUND: 0
COUGH: 0
DIZZINESS: 0

## 2025-06-05 NOTE — PROGRESS NOTES
[facility]   Infusion Clinic Note   Date: 2025   Name: Josee Henson  : 1935   MRN: [id number]         Reason for Visit: Injections and Injection Follow-up (Every 6 months Prolia 60mg subcutaneous injection)         Today: We administered denosumab.       Ordered By: Leonila Bocanegra MD       For a Diagnosis of: Osteoporosis, unspecified osteoporosis type, unspecified pathological fracture presence       At today's visit patient accompanied by: Self      Today's Vitals:   Vitals:    25 1346   BP: 149/82   Pulse: 90   Resp: 16   Temp: 36.3 °C (97.3 °F)   TempSrc: Temporal   SpO2: 95%             Pre - Treatment Checklist:      - Previous reaction to current treatment: no      (Assess patient for the concerns below. Document provider notification as appropriate).  - Active or recent infection with/without current antibiotic use: no  - Recent or planned invasive dental work: no  - Recent or planned surgeries: no  - Recently received or plans to receive vaccinations: no  - Has treatment related toxicities: no  - Any chance may be pregnant:  no      Pain: 0   - Is the pain different from normal: n/a   - Is prescribing Doctor aware:  n/a      Labs: Reviewed       Fall Risk Screening: Dorado Fall Risk  History of Falling, Immediate or Within 3 Months: No  Secondary Diagnosis: Yes  Ambulatory Aid: Crutches/cane/walker (Using quad cane)  Intravenous Therapy/Heparin Lock: No  Gait/Transferring: Normal/bedrest/immobile  Mental Status: Oriented to own ability  Dorado Fall Risk Score: 30       Review Of Systems:  Review of Systems   Respiratory:  Negative for cough, shortness of breath and wheezing.         Mild seasonal allergies   Cardiovascular:  Negative for chest pain, leg swelling and palpitations.        H/O pacemaker   Skin:  Negative for itching, rash and wound.   Neurological:  Negative for dizziness, extremity weakness, light-headedness and numbness.         Infusion  Readiness:  - Assessment Concerns Related to Infusion: No  - Provider notified: n/a      New Patient Education:    N/A (returning patient for continuation of therapy. Ongoing education provided as needed.)        Treatment Conditions & Drug Specific Questions:    Denosumab  (PROLIA. XGEVA. STOBOCLO)    (Unless otherwise specified on patient specific therapy plan):     TREATMENT CONDITIONS:  Unless otherwise specified on patient specific therapy plan HOLD and notify provider prior to proceeding with today's injection if patients:  O Corrected or Serum Calcium LESS THAN 8.6 mg/dL  OR Ionized calcium less than 1.1 mmol/L or  less than 4.7 mg/dL (depending on resulting agency)  o Recent or planned invasive dental procedure (within 4 weeks)    Lab Results   Component Value Date    CALCIUM 10.7 (H) 05/21/2025    PHOS 3.4 02/27/2024      Patient meets treatment conditions? Yes    DRUG SPECIFIC QUESTIONS:  Is the patient taking calcium and vitamin D? Yes  (Recommended)    Pt Instructed on following risks: (1) hypocalcemia, (2) osteonecrosis of the jaw, (3) atypical femoral fractures, (4) serious infections, and (5) dermatologic reactions?  Yes      REMINDER:  PREGNANCY CATEGORY X DRUG. OBTAIN NEGTATIVE PREGNANCY TEST PRIOR TO FIRST INFUSION FOR WOMEN OF CHILDBEARING ABILITY   REMS DRUG    Recommended Vitals/Observation:  Vitals: Obtain vitals prior to injection.  Observation: Patient may leave immediately following injection.        Weight Based Drug Calculations:    WEIGHT BASED DRUGS: NOT APPLICABLE / FLAT DOSE       Post Treatment: Patient tolerated treatment without issue and was discharged in no apparent distress.      Note Authored / Patient Cared for By: Sarahi Wild LPN

## 2025-06-05 NOTE — PATIENT INSTRUCTIONS
Today :We administered denosumab. Prolia 60mg subcutaneous injection left upper arm  Blood Calcium within 28 days of next Prolia appointment    For:   1. Osteoporosis, unspecified osteoporosis type, unspecified pathological fracture presence       Your next appointment is due in:  6 months        Please read the  Medication Guide that was given to you and reviewed during todays visit.     (Tell all doctors including dentists that you are taking this medication)     Go to the emergency room or call 911 if:  -You have signs of allergic reaction:   -Rash, hives, itching.   -Swollen, blistered, peeling skin.   -Swelling of face, lips, mouth, tongue or throat.   -Tightness of chest, trouble breathing, swallowing or talking     Call your doctor:  - If injection site gets red, warm, swollen, itchy or leaks fluid or pus.     (Leave band aid on your injection site for at least 2 hours and keep area clean and dry)  - If you get sick or have symptoms of infection or are not feeling well for any reason.    (Wash your hands often, stay away from people who are sick)  - If you have side effects from your medication that do not go away or are bothersome.     (Refer to the teaching your nurse gave you for side effects to call your doctor about)    - Common side effects may include:  stuffy nose, headache, feeling tired, muscle aches, upset stomach  - Before receiving any vaccines     - Call the [facility] at   If:  - You get sick, are on antibiotics, have had a recent vaccine, have surgery or dental work and your doctor wants your visit rescheduled.  - You need to cancel and reschedule your visit for any reason. Call at least 2 days before your visit if you need to cancel.   - Your insurance changes before your next visit.    (We will need to get approval from your new insurance. This can take up to two weeks.)     The [facility] is opened Monday thru Friday. We are closed on weekends and  holidays.   Voice mail will take your call if the center is closed. If you leave a message please allow 24 hours for a call back during weekdays. If you leave a message on a weekend/holiday, we will call you back the next business day.    A pharmacist is available Monday - Friday from 8:30AM to 3:30PM to help answer any questions you may have about your prescriptions(s). Please call pharmacy at:    [facility]: [phone]  [facility]: [phone]  [facility]: [phone]

## 2025-07-21 ENCOUNTER — OFFICE VISIT (OUTPATIENT)
Dept: CARDIOLOGY | Facility: HOSPITAL | Age: OVER 89
End: 2025-07-21
Payer: MEDICARE

## 2025-07-21 VITALS
BODY MASS INDEX: 28.6 KG/M2 | SYSTOLIC BLOOD PRESSURE: 138 MMHG | OXYGEN SATURATION: 93 % | WEIGHT: 151.5 LBS | DIASTOLIC BLOOD PRESSURE: 60 MMHG | RESPIRATION RATE: 18 BRPM | HEIGHT: 61 IN | HEART RATE: 73 BPM

## 2025-07-21 DIAGNOSIS — E78.00 PURE HYPERCHOLESTEROLEMIA: ICD-10-CM

## 2025-07-21 DIAGNOSIS — Z95.2 S/P TAVR (TRANSCATHETER AORTIC VALVE REPLACEMENT): ICD-10-CM

## 2025-07-21 DIAGNOSIS — I25.10 CORONARY ARTERY DISEASE INVOLVING NATIVE CORONARY ARTERY OF NATIVE HEART WITHOUT ANGINA PECTORIS: Primary | ICD-10-CM

## 2025-07-21 DIAGNOSIS — I10 PRIMARY HYPERTENSION: ICD-10-CM

## 2025-07-21 LAB
ATRIAL RATE: 73 BPM
P AXIS: 28 DEGREES
P OFFSET: 169 MS
P ONSET: 122 MS
PR INTERVAL: 158 MS
Q ONSET: 201 MS
QRS COUNT: 12 BEATS
QRS DURATION: 130 MS
QT INTERVAL: 416 MS
QTC CALCULATION(BAZETT): 458 MS
QTC FREDERICIA: 444 MS
R AXIS: -46 DEGREES
T AXIS: 17 DEGREES
T OFFSET: 409 MS
VENTRICULAR RATE: 73 BPM

## 2025-07-21 PROCEDURE — 93005 ELECTROCARDIOGRAM TRACING: CPT | Performed by: NURSE PRACTITIONER

## 2025-07-21 PROCEDURE — 3075F SYST BP GE 130 - 139MM HG: CPT | Performed by: NURSE PRACTITIONER

## 2025-07-21 PROCEDURE — 1159F MED LIST DOCD IN RCRD: CPT | Performed by: NURSE PRACTITIONER

## 2025-07-21 PROCEDURE — G2211 COMPLEX E/M VISIT ADD ON: HCPCS | Performed by: NURSE PRACTITIONER

## 2025-07-21 PROCEDURE — 1160F RVW MEDS BY RX/DR IN RCRD: CPT | Performed by: NURSE PRACTITIONER

## 2025-07-21 PROCEDURE — 99212 OFFICE O/P EST SF 10 MIN: CPT

## 2025-07-21 PROCEDURE — 99214 OFFICE O/P EST MOD 30 MIN: CPT | Performed by: NURSE PRACTITIONER

## 2025-07-21 PROCEDURE — 3078F DIAST BP <80 MM HG: CPT | Performed by: NURSE PRACTITIONER

## 2025-07-21 NOTE — PROGRESS NOTES
Primary Care Physician: Bashir Smith MD  Date of Visit: 07/21/2025 11:00 AM EDT  Location of visit: [facility]     Chief Complaint:   Chief Complaint   Patient presents with    Follow-up        HPI / Summary:   Josee Henson is a 89 y.o. female presents for followup. Seen in collaboration with Dr. Traylor. She has been able to walk the stores without chest pain or dyspnea. She had a mechanical fall two days ago tripping over the base of a street light. She did not have syncope and did not hit her head.  Denies chest pain, dyspnea, orthopnea, pnd, lightheadedness, dizziness, syncope, palpitations, or bleeding issues.                Past Medical History:  Past Medical History:   Diagnosis Date    Aortic stenosis     Hyperlipidemia     Hypertension         Past Surgical History:  Past Surgical History:   Procedure Laterality Date    CARDIAC CATHETERIZATION N/A 2/2/2024    Procedure: Left And Right Heart Cath, With LV;  Surgeon: Pérez Traylor MD;  Location: [facility] Cardiac Cath Lab;  Service: Cardiovascular;  Laterality: N/A;  Hydration needed @ 8:30 AM    CARDIAC CATHETERIZATION N/A 2/23/2024    Procedure: TAVR (Transcatheter AV Replacement);  Surgeon: Braydon Correa MD;  Location: [place] [address] Cardiac Cath Lab;  Service: Cardiovascular;  Laterality: N/A;  Medtronic, Evolut FX 26    CARDIAC CATHETERIZATION N/A 2/23/2024    Procedure: TVP for TAVR;  Surgeon: Braydon Correa MD;  Location: [place] [address] Cardiac Cath Lab;  Service: Cardiovascular;  Laterality: N/A;    CARDIAC CATHETERIZATION N/A 2/23/2024    Procedure: TAVR (Transcatheter AV Replacement);  Surgeon: Brandi Serrano MD;  Location: [place] [address] Cardiac Cath Lab;  Service: Cardiac Surgery;  Laterality: N/A;    CARDIAC ELECTROPHYSIOLOGY PROCEDURE Left 2/26/2024    Procedure: PPM IMPLANT DUAL;  Surgeon: Marie Bishop MD;  Location: [place] [address] Cardiac Cath Lab;  Service: Electrophysiology;  Laterality: Left;  [employer] company will be  determined by either Dr. Douglas or Dr. Bishop.    COLONOSCOPY  04/15/2014    Complete Colonoscopy    OTHER SURGICAL HISTORY  04/15/2014    Revision Of Total Knee Arthroplasty    TOTAL KNEE ARTHROPLASTY  04/15/2014    Total Knee Arthroplasty          Social History:   reports that she has never smoked. She has never used smokeless tobacco. She reports current alcohol use of about 1.0 standard drink of alcohol per week. She reports that she does not use drugs.     Family History:  family history includes Alzheimer's disease (age of onset: 83) in her mother; Diabetes (age of onset: 69) in her father; Heart attack in her father; Leukemia in her father; ovarian mass in her mother.      Allergies:  Allergies   Allergen Reactions    Tetracycline Rash and Unknown       Outpatient Medications:  Current Outpatient Medications   Medication Instructions    acetaminophen (TYLENOL EXTRA STRENGTH) 1,000 mg, Every 8 hours PRN    amoxicillin (Amoxil) 500 mg capsule TAKE 4 CAPSULES BY MOUTH 1 HOUR BEFORE PROCEDURE    aspirin (Ecotrin Low Strength) 81 mg EC tablet 1 tablet, Nightly    atorvastatin (LIPITOR) 80 mg, oral, Daily    biotin 5 mg capsule 1 capsule, Daily    calcium carbonate-vitamin D3 (Oscal-500) 500 mg-10 mcg (400 unit) tablet 2 tablets, Nightly    fexofenadine (ALLEGRA ALLERGY) 180 mg, Daily    flaxseed oiL 1,000 mg capsule 1 capsule, 2 times daily    fluticasone (Flonase) 50 mcg/actuation nasal spray 2 sprays, Daily    levothyroxine (SYNTHROID, LEVOXYL) 137 mcg, oral, Daily    multivitamin with minerals tablet 1 tablet, Daily    omeprazole (PriLOSEC) 20 mg DR capsule 1 capsule, Daily    Prolia 60 mg, subcutaneous, Every 6 months    quiNINE (Qualaquin) 324 mg capsule 1 capsule, Nightly    valsartan (DIOVAN) 40 mg, oral, 2 times daily    zolpidem (AMBIEN) 5 mg, oral, Nightly PRN       Physical Exam:  Vitals:    07/21/25 1133   BP: 143/89   BP Location: Left arm   Patient Position: Sitting   BP Cuff Size: Adult   Pulse:  "73   Resp: 18   SpO2: 93%   Weight: 68.7 kg (151 lb 8 oz)   Height: (!) 1.549 m (5' 1\")     Wt Readings from Last 5 Encounters:   07/21/25 68.7 kg (151 lb 8 oz)   01/21/25 69.4 kg (153 lb)   11/15/24 71.2 kg (157 lb)   10/01/24 71.2 kg (157 lb)   07/29/24 71.2 kg (157 lb)     Body mass index is 28.63 kg/m².     GENERAL: alert, cooperative, pleasant, in no acute distress  SKIN: warm and dry.  NECK: Normal JVD, negative HJR  CARDIAC: Regular rate and rhythm with 2/6 early peaking systolic murmur, no rubs or gallops  CHEST: Normal respiratory efforts, lungs clear to auscultation bilaterally.  ABDOMEN: soft, nontender, nondistended  EXTREMITIES: no edema, +2 palpable RP bilaterally.          Last Labs:  Recent Labs     11/09/24  1045 07/29/24  2051 07/11/24  1212   WBC 6.7 8.4 6.3   HGB 12.0 11.8* 11.9*   HCT 37.9 37.5 38.0    176 204   MCV 90 87 88     Recent Labs     05/21/25  1456 01/21/25  1316 11/09/24  1045    141 140   K 4.4 4.9 4.3    106 106   BUN 33* 35* 30*   CREATININE 1.79* 1.46* 1.67*     CMP -  Lab Results   Component Value Date    CALCIUM 10.7 (H) 05/21/2025    PHOS 3.4 02/27/2024    PROT 6.1 (L) 05/21/2025    ALBUMIN 4.0 05/21/2025    AST 22 05/21/2025    ALT 16 05/21/2025    ALKPHOS 108 05/21/2025    BILITOT 0.4 05/21/2025       LIPID PANEL -   Lab Results   Component Value Date    CHOL 161 01/21/2025    HDL 71.2 01/21/2025    LDLF 81 10/27/2022    TRIG 100 01/21/2025   LDL 70 1/21/25    Lab Results   Component Value Date    HGBA1C 5.9 (H) 11/09/2024       Last Cardiology Tests:  ECG:  Obtained and reviewed EKG- A paced, Bifascicular block HR 73    Echo:  Echo Results:  2/25/25 Echo  CONCLUSIONS:   1. Left ventricular ejection fraction is mildly decreased, calculated by Meeks's biplane at 46%.   2. There is global hypokinesis of the left ventricle with minor regional variations.   3. Spectral Doppler shows a Grade I (impaired relaxation pattern) of left ventricular diastolic " filling with normal left atrial filling pressure.   4. There is moderately increased septal thickness.   5. There is moderate concentric left ventricular hypertrophy.   6. There is low normal right ventricular systolic function.   7. Mild to moderate mitral valve regurgitation.   8. Moderate tricuspid regurgitation visualized.   9. Slightly elevated right ventricular systolic pressure.  10. There is Evolut transcatheter aortic valve bioprosthesis with a 26 mm reported size.  11. Compared with study dated 3/18/2024, no significant change The LVEF appears to have decreased. The magnitude of the tricuspid regurgitation appears to have increased. No significant changes in aortic transvalvular gradients.  12. Echo findings are consistent with normal aortic valve prosthesis structure and function.         2/24/24    CONCLUSIONS:  1. Left ventricular systolic function is hyperdynamic with a 70-75% estimated ejection fraction.  2. Aortic valve appears abnormal.  3. There is a transcatheter aortic valve replacement.          Transthoracic Echo (TTE) Limited With Doppler And Color 02/23/2024    CONCLUSIONS:  1. Left ventricular systolic function is normal with a 70% estimated ejection fraction.  2. Poorly visualized anatomical structures due to suboptimal image quality.  3. Mild to moderate mitral valve regurgitation.  4. Mildly elevated RVSP.  5. There is moderate to severe aortic valve cusp calcification.        Transthoracic Echo (TTE) Complete 11/30/2023      CONCLUSIONS:  1. Left ventricular systolic function is normal with a 60% estimated ejection fraction.  2. Spectral Doppler shows a pseudonormal pattern of left ventricular diastolic filling.  3. Moderate to severe aortic valve stenosis. pk/mn 50/29 mmHg, DI 0.23, ADITYA 0.6 suggests low flow low gradient severe type.  4. There is moderate to severe aortic valve cusp calcification.  5. Compared with study from 11/8/2022, dimensionless index 0.23 down from 0.34 suggesting  more severe aortic stenosis. Clinical correlation suggested.                 Cath:  24  Coronary Lesion Summary:  Vessel            Stenosis      Vessel Segment  Left Main    10 to 30% stenosis     distal  LAD          10 to 30% stenosis    proximal  1st Diagonal    80% stenosis       proximal  Circumflex      30% stenosis        ostial  RCA          10 to 30% stenosis proximal to mid    CONCLUSIONS:   1. 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      Cardiac Imagin25  CONCLUSIONS:   1. Left ventricular ejection fraction is mildly decreased, calculated by Meeks's biplane at 46%.   2. There is global hypokinesis of the left ventricle with minor regional variations.   3. Spectral Doppler shows a Grade I (impaired relaxation pattern) of left ventricular diastolic filling with normal left atrial filling pressure.   4. There is moderately increased septal thickness.   5. There is moderate concentric left ventricular hypertrophy.   6. There is low normal right ventricular systolic function.   7. Mild to moderate mitral valve regurgitation.   8. Moderate tricuspid regurgitation visualized.   9. Slightly elevated right ventricular systolic pressure.  10. There is Evolut transcatheter aortic valve bioprosthesis with a 26 mm reported size.  11. Compared with study dated 3/18/2024, no significant change The LVEF appears to have decreased. The magnitude of the tricuspid regurgitation appears to have increased. No significant changes in aortic transvalvular gradients.  12. Echo findings are consistent with normal aortic valve prosthesis structure and function.      Assessment/Plan     Josee was seen today for follow-up.  Diagnoses and all orders for this visit:  Coronary artery disease involving native coronary artery of native heart without angina pectoris (Primary)  -     ECG 12 lead (Clinic Performed)  S/P TAVR (transcatheter aortic valve replacement)  Primary hypertension  -     Basic metabolic  panel; Future  -     Basic metabolic panel  Pure hypercholesterolemia          In summary Ms. Henson is a pleasant 89-year-old white female with a past medical history significant for aortic stenosis s/p TAVR with subsequent CHB s/p pacemaker, hypertension, hyperlipidemia, chronic kidney disease, glucose intolerance, and obesity. She is overall feeling well. Her blood pressure is acceptable. Recent lipid panel is acceptable. I have encouraged her to increase physical activity. I will review echocardiogram with Dr. Traylor from February 2025 as her LV function was reduced. She will continue current cardiovascular medications. She will follow up in January with Dr. Traylor.     Addendum 7/23/24: Discussed prior echocardiogram results with Dr. Traylor. He had reviewed echocardiogram previously noting her LV function to not be reduced. Will repeat echocardiogram for surveillance of LV function. Spoke to patient on phone today and she is agreeable.               Orders:  No orders of the defined types were placed in this encounter.     Followup Appts:  Future Appointments   Date Time Provider Department Center   12/5/2025  2:30 PM INF 01 [facility]           ____________________________________________________________  Elisa Hahn, APRN-CNP  [facility]  [facility]

## 2025-07-22 LAB
ANION GAP SERPL CALCULATED.4IONS-SCNC: 10 MMOL/L (CALC) (ref 7–17)
BUN SERPL-MCNC: 34 MG/DL (ref 7–25)
BUN/CREAT SERPL: 22 (CALC) (ref 6–22)
CALCIUM SERPL-MCNC: 10 MG/DL (ref 8.6–10.4)
CHLORIDE SERPL-SCNC: 108 MMOL/L (ref 98–110)
CO2 SERPL-SCNC: 24 MMOL/L (ref 20–32)
CREAT SERPL-MCNC: 1.56 MG/DL (ref 0.6–0.95)
EGFRCR SERPLBLD CKD-EPI 2021: 31 ML/MIN/1.73M2
GLUCOSE SERPL-MCNC: 127 MG/DL (ref 65–139)
POTASSIUM SERPL-SCNC: 4.5 MMOL/L (ref 3.5–5.3)
SODIUM SERPL-SCNC: 142 MMOL/L (ref 135–146)

## 2025-08-01 ENCOUNTER — HOSPITAL ENCOUNTER (OUTPATIENT)
Dept: CARDIOLOGY | Facility: HOSPITAL | Age: OVER 89
Discharge: HOME | End: 2025-08-01
Payer: MEDICARE

## 2025-08-01 DIAGNOSIS — Z95.2 S/P TAVR (TRANSCATHETER AORTIC VALVE REPLACEMENT): ICD-10-CM

## 2025-08-01 LAB
AORTIC VALVE MEAN GRADIENT: 8 MMHG
AORTIC VALVE PEAK VELOCITY: 2 M/S
AV PEAK GRADIENT: 16 MMHG
AVA (PEAK VEL): 2.38 CM2
AVA (VTI): 2.54 CM2
EJECTION FRACTION APICAL 4 CHAMBER: 66.7
EJECTION FRACTION: 68 %
LEFT ATRIUM VOLUME AREA LENGTH INDEX BSA: 30.2 ML/M2
LEFT VENTRICLE INTERNAL DIMENSION DIASTOLE: 3.94 CM (ref 3.5–6)
LEFT VENTRICULAR OUTFLOW TRACT DIAMETER: 1.95 CM
MITRAL VALVE E/A RATIO: 0.55
RIGHT VENTRICLE FREE WALL PEAK S': 13 CM/S
RIGHT VENTRICLE PEAK SYSTOLIC PRESSURE: 33 MMHG
TRICUSPID ANNULAR PLANE SYSTOLIC EXCURSION: 2.1 CM

## 2025-08-01 PROCEDURE — 93306 TTE W/DOPPLER COMPLETE: CPT

## 2025-08-01 PROCEDURE — 93306 TTE W/DOPPLER COMPLETE: CPT | Performed by: INTERNAL MEDICINE

## 2025-08-15 DIAGNOSIS — E03.9 HYPOTHYROIDISM, UNSPECIFIED TYPE: ICD-10-CM

## 2025-08-15 RX ORDER — LEVOTHYROXINE SODIUM 137 UG/1
137 TABLET ORAL DAILY
Qty: 90 TABLET | Refills: 2 | Status: SHIPPED | OUTPATIENT
Start: 2025-08-15 | End: 2026-08-15

## 2025-08-25 ENCOUNTER — HOSPITAL ENCOUNTER (OUTPATIENT)
Dept: CARDIOLOGY | Facility: CLINIC | Age: OVER 89
Discharge: HOME | End: 2025-08-25
Payer: MEDICARE

## 2025-08-25 DIAGNOSIS — I44.30 UNSPECIFIED ATRIOVENTRICULAR BLOCK: ICD-10-CM

## 2025-08-25 DIAGNOSIS — Z95.0 PRESENCE OF CARDIAC PACEMAKER: ICD-10-CM

## 2025-08-25 PROCEDURE — 93296 REM INTERROG EVL PM/IDS: CPT

## 2025-12-05 ENCOUNTER — APPOINTMENT (OUTPATIENT)
Dept: INFUSION THERAPY | Facility: CLINIC | Age: OVER 89
End: 2025-12-05
Payer: MEDICARE

## (undated) DEVICE — INTRODUCER SHEATH, SENTRANT ENSURE SEAL 14FR 28CM

## (undated) DEVICE — CABLE, PACING PATIENT BIPOLAR 8'

## (undated) DEVICE — CATHETER, ANGIO, IMPULSE, FR4, 6 FR X 100 CM

## (undated) DEVICE — DELIVERY SYSTEM, EVOLUT FX, 23-29MM

## (undated) DEVICE — SLEEVE, REPOSITIONING, W/C-LOCK ADAPTER, 60 CM

## (undated) DEVICE — DEVICE, CLOSURE, PERCLOSE, PROSTYLE

## (undated) DEVICE — INTRODUCER, GLIDESHEATH SLENDER A-KIT, 5FR 10CM

## (undated) DEVICE — CATHETER, PACING, PACEL, FLOW DIRECTED, 5 FR X 110 CM

## (undated) DEVICE — CATHETER, ANGIO, IMPULSE, PIGTAIL, 6 FR X 110 CM

## (undated) DEVICE — CATHETER, ANGIO, IMPULSE, PIG 155, 6 FR X 110 CM

## (undated) DEVICE — GUIDEWIRE, INQWIRE, 3MM J, .035, 260

## (undated) DEVICE — Device

## (undated) DEVICE — INTRODUCER SYSTEM, PRELUDE SNAP, SPLITTABLE, HEMOSTATIC, 7FR

## (undated) DEVICE — GUIDE WIRE, 035/190CM, HI-TORGUE SUPRA CORE

## (undated) DEVICE — SHIELD, RADPAD, EP LEFT SUBCLAVIAN, 12.5 X 16.5, YELLOW LEVEL ATTENUATION

## (undated) DEVICE — INTRODUCER, SHEATH, FAST-CATH, 7 FR X 23 CM

## (undated) DEVICE — BAND, VASCULAR, RADIAL HEMOSTAT, REGULAR 24CM

## (undated) DEVICE — GUIDEWIRE, J WIRE

## (undated) DEVICE — GUIDEWIRE, HI-TORQUE, VERSACORE, 145CM, FLOPPY

## (undated) DEVICE — ELECTRODE, QUICK-COMBO, EDGE SYSTEM, REDI PACK

## (undated) DEVICE — CABLE, SURGICAL, SM CLIP

## (undated) DEVICE — ACCESS KIT, S-MAK MINI, 4FR 10CM 0.018IN 40CM, SS/SS, ECHO ENHANCE NEEDLE

## (undated) DEVICE — ENVELOPE, ANTIBACTERIAL, AIGIS RX TYRX, ABSORBABLE, LRG

## (undated) DEVICE — CATHETER, DIAGNOSTIC, 4 FR-JL 3.5

## (undated) DEVICE — CATHETER, DIAGNOSTIC, 4 FR-JR 4